# Patient Record
Sex: FEMALE | Race: BLACK OR AFRICAN AMERICAN | NOT HISPANIC OR LATINO | Employment: OTHER | URBAN - METROPOLITAN AREA
[De-identification: names, ages, dates, MRNs, and addresses within clinical notes are randomized per-mention and may not be internally consistent; named-entity substitution may affect disease eponyms.]

---

## 2017-01-11 DIAGNOSIS — M81.0 SENILE OSTEOPOROSIS: Primary | ICD-10-CM

## 2017-01-16 ENCOUNTER — INFUSION (OUTPATIENT)
Dept: INFECTIOUS DISEASES | Facility: HOSPITAL | Age: 77
End: 2017-01-16
Attending: INTERNAL MEDICINE
Payer: MEDICARE

## 2017-01-16 VITALS — WEIGHT: 127 LBS | BODY MASS INDEX: 24.94 KG/M2 | HEIGHT: 60 IN

## 2017-01-16 DIAGNOSIS — M81.0 SENILE OSTEOPOROSIS: Primary | ICD-10-CM

## 2017-01-16 PROCEDURE — 63600175 PHARM REV CODE 636 W HCPCS: Performed by: INTERNAL MEDICINE

## 2017-01-16 PROCEDURE — 96401 CHEMO ANTI-NEOPL SQ/IM: CPT

## 2017-01-16 RX ADMIN — DENOSUMAB 60 MG: 60 INJECTION SUBCUTANEOUS at 02:01

## 2017-01-20 RX ORDER — POTASSIUM CHLORIDE 750 MG/1
TABLET, EXTENDED RELEASE ORAL
Qty: 30 TABLET | Refills: 0 | Status: SHIPPED | OUTPATIENT
Start: 2017-01-20 | End: 2017-02-22 | Stop reason: SDUPTHER

## 2017-01-26 DIAGNOSIS — M05.79 RHEUMATOID ARTHRITIS INVOLVING MULTIPLE SITES WITH POSITIVE RHEUMATOID FACTOR: ICD-10-CM

## 2017-01-27 RX ORDER — METHOTREXATE 2.5 MG/1
TABLET ORAL
Qty: 20 TABLET | Refills: 0 | OUTPATIENT
Start: 2017-01-27

## 2017-02-03 ENCOUNTER — LAB VISIT (OUTPATIENT)
Dept: LAB | Facility: HOSPITAL | Age: 77
End: 2017-02-03
Attending: INTERNAL MEDICINE
Payer: MEDICARE

## 2017-02-03 DIAGNOSIS — M05.79 RHEUMATOID ARTHRITIS INVOLVING MULTIPLE SITES WITH POSITIVE RHEUMATOID FACTOR: ICD-10-CM

## 2017-02-03 DIAGNOSIS — Z79.899 HIGH RISK MEDICATION USE: Chronic | ICD-10-CM

## 2017-02-03 LAB
ALBUMIN SERPL BCP-MCNC: 3.5 G/DL
ALP SERPL-CCNC: 62 U/L
ALT SERPL W/O P-5'-P-CCNC: 20 U/L
ANION GAP SERPL CALC-SCNC: 9 MMOL/L
AST SERPL-CCNC: 19 U/L
BASOPHILS # BLD AUTO: 0.04 K/UL
BASOPHILS NFR BLD: 0.4 %
BILIRUB SERPL-MCNC: 0.7 MG/DL
BUN SERPL-MCNC: 14 MG/DL
CALCIUM SERPL-MCNC: 8.9 MG/DL
CHLORIDE SERPL-SCNC: 109 MMOL/L
CO2 SERPL-SCNC: 26 MMOL/L
CREAT SERPL-MCNC: 0.9 MG/DL
CRP SERPL-MCNC: 1 MG/L
DIFFERENTIAL METHOD: ABNORMAL
EOSINOPHIL # BLD AUTO: 0.4 K/UL
EOSINOPHIL NFR BLD: 3.9 %
ERYTHROCYTE [DISTWIDTH] IN BLOOD BY AUTOMATED COUNT: 18.2 %
ERYTHROCYTE [SEDIMENTATION RATE] IN BLOOD BY WESTERGREN METHOD: 24 MM/HR
EST. GFR  (AFRICAN AMERICAN): >60 ML/MIN/1.73 M^2
EST. GFR  (NON AFRICAN AMERICAN): >60 ML/MIN/1.73 M^2
GLUCOSE SERPL-MCNC: 91 MG/DL
HCT VFR BLD AUTO: 33.1 %
HGB BLD-MCNC: 9.9 G/DL
LYMPHOCYTES # BLD AUTO: 1.8 K/UL
LYMPHOCYTES NFR BLD: 20.7 %
MCH RBC QN AUTO: 25.3 PG
MCHC RBC AUTO-ENTMCNC: 29.9 %
MCV RBC AUTO: 85 FL
MONOCYTES # BLD AUTO: 0.6 K/UL
MONOCYTES NFR BLD: 7.1 %
NEUTROPHILS # BLD AUTO: 6 K/UL
NEUTROPHILS NFR BLD: 67.6 %
PLATELET # BLD AUTO: 366 K/UL
PMV BLD AUTO: 10.7 FL
POTASSIUM SERPL-SCNC: 3.7 MMOL/L
PROT SERPL-MCNC: 7.1 G/DL
RBC # BLD AUTO: 3.91 M/UL
SODIUM SERPL-SCNC: 144 MMOL/L
WBC # BLD AUTO: 8.9 K/UL

## 2017-02-03 PROCEDURE — 86140 C-REACTIVE PROTEIN: CPT

## 2017-02-03 PROCEDURE — 36415 COLL VENOUS BLD VENIPUNCTURE: CPT

## 2017-02-03 PROCEDURE — 80053 COMPREHEN METABOLIC PANEL: CPT

## 2017-02-03 PROCEDURE — 85651 RBC SED RATE NONAUTOMATED: CPT

## 2017-02-03 PROCEDURE — 85025 COMPLETE CBC W/AUTO DIFF WBC: CPT

## 2017-02-07 DIAGNOSIS — M05.9 SEROPOSITIVE RHEUMATOID ARTHRITIS: ICD-10-CM

## 2017-02-07 DIAGNOSIS — M05.79 RHEUMATOID ARTHRITIS INVOLVING MULTIPLE SITES WITH POSITIVE RHEUMATOID FACTOR: ICD-10-CM

## 2017-02-07 RX ORDER — METHOTREXATE 2.5 MG/1
TABLET ORAL
Qty: 20 TABLET | Refills: 0 | Status: SHIPPED | OUTPATIENT
Start: 2017-02-07 | End: 2017-02-08 | Stop reason: SDUPTHER

## 2017-02-07 RX ORDER — FOLIC ACID 1 MG/1
TABLET ORAL
Qty: 90 TABLET | Refills: 0 | Status: SHIPPED | OUTPATIENT
Start: 2017-02-07 | End: 2017-05-12 | Stop reason: SDUPTHER

## 2017-02-08 RX ORDER — METHOTREXATE 2.5 MG/1
TABLET ORAL
Qty: 20 TABLET | Refills: 2 | Status: SHIPPED | OUTPATIENT
Start: 2017-02-08 | End: 2017-06-30 | Stop reason: SDUPTHER

## 2017-02-23 RX ORDER — POTASSIUM CHLORIDE 750 MG/1
TABLET, EXTENDED RELEASE ORAL
Qty: 30 TABLET | Refills: 0 | Status: SHIPPED | OUTPATIENT
Start: 2017-02-23 | End: 2017-03-29 | Stop reason: SDUPTHER

## 2017-02-24 ENCOUNTER — TELEPHONE (OUTPATIENT)
Dept: INTERNAL MEDICINE | Facility: CLINIC | Age: 77
End: 2017-02-24

## 2017-02-24 ENCOUNTER — OFFICE VISIT (OUTPATIENT)
Dept: RHEUMATOLOGY | Facility: CLINIC | Age: 77
End: 2017-02-24
Payer: MEDICARE

## 2017-02-24 VITALS
HEART RATE: 79 BPM | WEIGHT: 126.81 LBS | DIASTOLIC BLOOD PRESSURE: 62 MMHG | SYSTOLIC BLOOD PRESSURE: 149 MMHG | HEIGHT: 57 IN | BODY MASS INDEX: 27.36 KG/M2

## 2017-02-24 DIAGNOSIS — M81.0 SENILE OSTEOPOROSIS: ICD-10-CM

## 2017-02-24 DIAGNOSIS — D63.8 ANEMIA OF CHRONIC ILLNESS: Chronic | ICD-10-CM

## 2017-02-24 DIAGNOSIS — Z79.899 HIGH RISK MEDICATION USE: Chronic | ICD-10-CM

## 2017-02-24 DIAGNOSIS — M05.79 RHEUMATOID ARTHRITIS INVOLVING MULTIPLE SITES WITH POSITIVE RHEUMATOID FACTOR: Primary | ICD-10-CM

## 2017-02-24 PROCEDURE — 99999 PR PBB SHADOW E&M-EST. PATIENT-LVL III: CPT | Mod: PBBFAC,,, | Performed by: INTERNAL MEDICINE

## 2017-02-24 PROCEDURE — 99214 OFFICE O/P EST MOD 30 MIN: CPT | Mod: S$PBB,,, | Performed by: INTERNAL MEDICINE

## 2017-02-24 PROCEDURE — 99213 OFFICE O/P EST LOW 20 MIN: CPT | Mod: PBBFAC | Performed by: INTERNAL MEDICINE

## 2017-02-24 ASSESSMENT — ROUTINE ASSESSMENT OF PATIENT INDEX DATA (RAPID3)
PAIN SCORE: 5
PATIENT GLOBAL ASSESSMENT SCORE: 6
TOTAL RAPID3 SCORE: 4.33
FATIGUE SCORE: 5.5
PSYCHOLOGICAL DISTRESS SCORE: 1.1
WHEN YOU AWAKENED IN THE MORNING OVER THE LAST WEEK, PLEASE INDICATE THE AMOUNT OF TIME IT TAKES UNTIL YOU ARE AS LIMBER AS YOU WILL BE FOR THE DAY: 2 MINUTES
MDHAQ FUNCTION SCORE: .6
AM STIFFNESS SCORE: 1, YES

## 2017-02-24 NOTE — MR AVS SNAPSHOT
[facility]  [address]  [facility] [address]  Phone: [phone]  Fax: [phone]                  Teresita Tolbert   2017 10:00 AM   Office Visit    Description:  Female : 1940   Provider:  Nico Squires MD   Department:  [facility]           Reason for Visit     Disease Management           Diagnoses this Visit        Comments    Rheumatoid arthritis involving multiple sites with positive rheumatoid factor    -  Primary     High risk medication use         Anemia of chronic illness         Senile osteoporosis                To Do List           Future Appointments        Provider Department Dept Phone    3/9/2017 1:00 PM Waqas Georges III, MD [facility] Neurology [phone]      Goals (5 Years of Data)     None      Follow-Up and Disposition     Return in about 3 months (around 2017).      [facility]     [facility] -  Assistance  Registered nurses in the [facility] provide clinical advisement, health education, appointment booking, and other advisory services.  Call for this free service at [phone].             Medications           Message regarding Medications     Verify the changes and/or additions to your medication regime listed below are the same as discussed with your clinician today.  If any of these changes or additions are incorrect, please notify your healthcare provider.             Verify that the below list of medications is an accurate representation of the medications you are currently taking.  If none reported, the list may be blank. If incorrect, please contact your healthcare provider. Carry this list with you in case of emergency.           Current Medications     amlodipine (NORVASC) 5 MG tablet Take 1 tablet (5 mg total) by mouth once daily.    aspirin (ECOTRIN) 81 MG EC tablet Take 81 mg by mouth once daily.      atenolol (TENORMIN) 50 MG tablet Take 2 tablets (100 mg  total) by mouth once daily.    bismuth subsalicylate (PEPTO BISMOL) 262 mg/15 mL suspension Take 15 mLs by mouth every 6 (six) hours as needed.    BLOOD SUGAR DIAGNOSTIC (ONE TOUCH ULTRA TEST [facility]) * * * As directed.  test sugars 2 times daily    blood sugar diagnostic (ONETOUCH ULTRA TEST) Strp USE TO TEST BLOOD SUGAR2 TIMES DAILY    calcium-vitamin D3 500 mg(1,250mg) -200 unit per tablet Take 1 tablet by mouth once daily.    folic acid (FOLVITE) 1 MG tablet TAKE 1 TABLET BY MOUTH EVERY DAY    methotrexate 2.5 MG Tab TAKE 4 TABLETS BY MOUTH EVERY SEVEN DAYS    multivitamin capsule Take 1 capsule by mouth once daily.      ONETOUCH ULTRA TEST Strp USE TO TEST THREE TIMES DAILY.    potassium chloride (KLOR-CON) 10 MEQ TbSR TAKE 1 TABLET BY MOUTH DAILY    predniSONE (DELTASONE) 5 MG tablet Take 2 tablets (10 mg total) by mouth every other day.    sitagliptan-metformin (JANUMET)  mg per tablet Take 1 tablet by mouth 2 (two) times daily.           Clinical Reference Information           Your Vitals Were     BP                   149/62           Blood Pressure          Most Recent Value    BP  (!)  149/62      Allergies as of 2/24/2017     Erythromycin    Sulfa (Sulfonamide Antibiotics)      Immunizations Administered on Date of Encounter - 2/24/2017     None      [facility] Sign-Up     Activating your [facility] account is as easy as 1-2-3!     1) Visit [email or web address], select Sign Up Now, enter this activation code and your date of birth, then select Next.  [email or web address]  Expires: 4/10/2017 10:49 AM      2) Create a username and password to use when you visit [facility] in the future and select a security question in case you lose your password and select Next.    3) Enter your e-mail address and click Sign Up!    Additional Information  If you have questions, please e-mail [email or web address] or call [phone] to talk to our [facility] staff. Remember, [facility] is NOT to be used for urgent needs. For  medical emergencies, dial 911.         Language Assistance Services     ATTENTION: Language assistance services are available, free of charge. Please call [phone].      ATENCIÓN: Si habla giovanna, tiene a yoo disposición servicios gratuitos de asistencia lingüística. Llame al [phone].     CHÚ Ý: N?u b?n nói Ti?ng Vi?t, có các d?ch v? h? tr? ngôn ng? mi?n phí dành cho b?n. G?i s? [phone].         [facility] complies with applicable Federal civil rights laws and does not discriminate on the basis of race, color, national origin, age, disability, or sex.

## 2017-02-24 NOTE — PROGRESS NOTES
"Subjective:       Patient ID: Teresita Tolbert is a 76 y.o. female.    Chief Complaint: Disease Management    HPI   F/u RA, seropos on MTX 10 mg/week since May 2015  F/u osteoporosis with lumbar compressions     Had prolia Jan 16  PMH: myasthenia gravis on prednisone and previously azathioprine until started MTX for RA     No hand pain   Managing to function with hand deformities    No interval infections    Review of Systems   Constitutional: Negative for fatigue, fever and unexpected weight change.   HENT: Positive for trouble swallowing. Negative for mouth sores.          Dry mouth   Eyes: Negative for redness.        No Dry eyes   Respiratory: Positive for shortness of breath. Negative for cough.    Cardiovascular: Positive for chest pain.   Gastrointestinal: Positive for constipation and diarrhea. Negative for abdominal pain.        Chews AR for heartburn; it relieves her sx   Skin: Negative for rash.   Neurological: Negative for headaches.   Hematological: Negative for adenopathy. Does not bruise/bleed easily.   Psychiatric/Behavioral: Negative for sleep disturbance.         Objective:   BP (!) 149/62  Pulse 79  Ht 4' 9" (1.448 m)  Wt 57.5 kg (126 lb 12.8 oz)  BMI 27.44 kg/m2     Physical Exam    hand deformities with ulnar deviation L 3-5 digits; no swelling or tenderness    Lab reviewed ; ok except chronic anemia sl worse  Assessment:       1. Rheumatoid arthritis involving multiple sites with positive rheumatoid factor    2. High risk medication use    3. Anemia of chronic illness    4. Senile osteoporosis        Rheumatoid  arthritis with low disease activity on methotrexate monotherapy  Tolerating methotrexate without adverse effects  3.  Anemia of chronic disease which has worsened somewhat, etiology of which is not clear  Plan:       1.  Continue current therapy  2. She will have lab in 3 mo for mtx monitoring  3. I will send note to Dr. Barrera for his f/u of anemia; her consider GI workup versus " empiric proton pump inhibitor therapy

## 2017-03-01 NOTE — TELEPHONE ENCOUNTER
Pt called back- phone staff sched 30 min EP visit on 3/24/17 11:15am. Pt confirmed appt date and time.

## 2017-03-09 ENCOUNTER — OFFICE VISIT (OUTPATIENT)
Dept: NEUROLOGY | Facility: CLINIC | Age: 77
End: 2017-03-09
Payer: MEDICARE

## 2017-03-09 VITALS
DIASTOLIC BLOOD PRESSURE: 78 MMHG | BODY MASS INDEX: 24.31 KG/M2 | HEART RATE: 77 BPM | SYSTOLIC BLOOD PRESSURE: 174 MMHG | WEIGHT: 128.75 LBS | HEIGHT: 61 IN

## 2017-03-09 DIAGNOSIS — M05.79 RHEUMATOID ARTHRITIS INVOLVING MULTIPLE SITES WITH POSITIVE RHEUMATOID FACTOR: ICD-10-CM

## 2017-03-09 DIAGNOSIS — E11.8 TYPE 2 DIABETES MELLITUS WITH COMPLICATION, WITHOUT LONG-TERM CURRENT USE OF INSULIN: ICD-10-CM

## 2017-03-09 DIAGNOSIS — Z90.89 MYASTHENIA GRAVIS STATUS POST THYMECTOMY: Primary | ICD-10-CM

## 2017-03-09 DIAGNOSIS — G62.9 PERIPHERAL POLYNEUROPATHY: ICD-10-CM

## 2017-03-09 DIAGNOSIS — I10 ESSENTIAL HYPERTENSION: ICD-10-CM

## 2017-03-09 DIAGNOSIS — G70.00 MYASTHENIA GRAVIS STATUS POST THYMECTOMY: Primary | ICD-10-CM

## 2017-03-09 PROCEDURE — 99999 PR PBB SHADOW E&M-EST. PATIENT-LVL III: CPT | Mod: PBBFAC,,,

## 2017-03-09 PROCEDURE — 99213 OFFICE O/P EST LOW 20 MIN: CPT | Mod: PBBFAC

## 2017-03-09 PROCEDURE — 99214 OFFICE O/P EST MOD 30 MIN: CPT | Mod: S$PBB,,,

## 2017-03-09 RX ORDER — PREDNISONE 5 MG/1
10 TABLET ORAL EVERY OTHER DAY
Qty: 90 TABLET | Refills: 1 | Status: SHIPPED | OUTPATIENT
Start: 2017-03-09 | End: 2017-04-12 | Stop reason: SDUPTHER

## 2017-03-09 NOTE — PROGRESS NOTES
This office note has been dictated.  Teresita Tolbert [facility] to [facility] for medical management of myasthenia   gravis.  When I saw her last, she was experiencing some swallowing difficulty   after cutting back her prednisone dose to 5 mg every other day.  I put her back   on the 10 mg every other day dosing and she feels like she has gotten back to   her baseline.  Her swallowing has improved.  She does not report any new   problems.  She does have a number of comorbidities including rheumatoid   arthritis for which she sees Dr. Squires, as well as hypertension and diabetes   among others.    NEUROLOGICAL REVIEW OF SYSTEMS:  She mentions feeling tired.  Her speech problem   is old.  She still experiences some occasional heartburn as noted previously.    Her diabetes medicine can sometimes cause her to have diarrhea.  There is also   some urinary frequency without incontinence.  She denies fever, chills, sweats,   dizziness, tinnitus, hearing loss, headaches, chest pain, cough, shortness of   breath, nausea and vomiting.    MEDICATIONS:  I reviewed the list of medicines and edited the electronic record.    SOCIAL HISTORY:  She lives with her son.    PHYSICAL EXAMINATION:  NEUROLOGIC:  This is an alert and attentive lady.  Her speech is again slightly   slurred with a tendency to protrude her tongue as she talks.  It is adequately   articulated and appropriate.  The vital signs are reviewed and included in this   note.  The blood pressure is a little elevated and I want her to follow this up   with her primary care.  She is neatly dressed and in no acute distress.  Cranial   nerve examination again reveals the postoperative pupils.  Her eyes move   conjugately and the fields are full to confrontation.  She has weakness,   particularly of the upper face as she is unable to completely close her eyes.    She snarls when she smiles.  There are no gross sensory deficits on the face and   she protrudes the tongue to  command.  Hearing is approximately equal in the two   ears.  Her neck is supple, the pulses are equal and no bruits are heard.  There   is no drift to the upper extremities with adequate  strength, but again she   is noted to have interosseous atrophy, weakness and arthritic changes in her   hands and fingers.  Muscle tone is within normal limits, however.  Fine motor   movements are decreased bilaterally.  Rapid alternating movements are adequately   performed as is finger-to-nose.  There are no gross sensory deficits.  She is   able to rise from the chair and walk with a shortened stride without balance   assistance, though she benefits from a cane for distance.  Her deep tendon   reflexes are symmetrical with the exception of the ankle jerks, which cannot be   elicited.    I reviewed her clinic record including her most recent laboratory and discussed   the situation with her in detail.    In conclusion, this lady is doing well at this time and her prednisone 10 mg   every other day will be continued.  The prescription has been forwarded to the   pharmacy electronically.  I have recommended she follow up with her primary for   the blood pressure and to monitor her chronic anemia.  Her next appointment with   me will be in six months, though she may call if there are any questions in the   interim.      EH/LINUS  dd: 03/09/2017 13:38:04 (CST)  td: 03/10/2017 04:16:35 (CST)  Doc ID   #[id number]  Job ID #[id number]    CC:

## 2017-03-09 NOTE — MR AVS SNAPSHOT
[facility]  [address]  [facility] [address]  Phone: [phone]  Fax: [phone]                  Teresita Tolbert   3/9/2017 1:00 PM   Office Visit    Description:  Female : 1940   Provider:  Waqas Georges III, MD   Department:  [facility]           Reason for Visit     Follow-up           Diagnoses this Visit        Comments    Myasthenia gravis status post thymectomy    -  Primary     Type 2 diabetes mellitus with complication, without long-term current use of insulin         Peripheral polyneuropathy         Essential hypertension         Rheumatoid arthritis involving multiple sites with positive rheumatoid factor                To Do List           Future Appointments        Provider Department Dept Phone    3/24/2017 11:15 AM Meet Barrera MD [facility] - Internal Medicine [phone]    2017 8:15 AM LAB, SAME DAY [facility] [phone]    2017 9:00 AM Nico Squires MD [facility] - Rheumatology [phone]      Goals (5 Years of Data)     None      Follow-Up and Disposition     Return in about 6 months (around 2017).       These Medications        Disp Refills Start End    predniSONE (DELTASONE) 5 MG tablet 90 tablet 1 3/9/2017     Take 2 tablets (10 mg total) by mouth every other day. - Oral    Pharmacy: [facility] [address] [place] [address] AT [facility] Ph #: [phone]         [facility]     [facility] Care [facility] -  Assistance  Registered nurses in the [facility] provide clinical advisement, health education, appointment booking, and other advisory services.  Call for this free service at [phone].             Medications           Message regarding Medications     Verify the changes and/or additions to your medication regime listed below are the same as discussed with your clinician today.  If any of these changes or  "additions are incorrect, please notify your healthcare provider.             Verify that the below list of medications is an accurate representation of the medications you are currently taking.  If none reported, the list may be blank. If incorrect, please contact your healthcare provider. Carry this list with you in case of emergency.           Current Medications     amlodipine (NORVASC) 5 MG tablet Take 1 tablet (5 mg total) by mouth once daily.    aspirin (ECOTRIN) 81 MG EC tablet Take 81 mg by mouth once daily.      atenolol (TENORMIN) 50 MG tablet Take 2 tablets (100 mg total) by mouth once daily.    bismuth subsalicylate (PEPTO BISMOL) 262 mg/15 mL suspension Take 15 mLs by mouth every 6 (six) hours as needed.    BLOOD SUGAR DIAGNOSTIC (ONE TOUCH ULTRA TEST [facility]) * * * As directed.  test sugars 2 times daily    blood sugar diagnostic (ONETOUCH ULTRA TEST) Strp USE TO TEST BLOOD SUGAR2 TIMES DAILY    calcium-vitamin D3 500 mg(1,250mg) -200 unit per tablet Take 1 tablet by mouth once daily.    folic acid (FOLVITE) 1 MG tablet TAKE 1 TABLET BY MOUTH EVERY DAY    methotrexate 2.5 MG Tab TAKE 4 TABLETS BY MOUTH EVERY SEVEN DAYS    multivitamin capsule Take 1 capsule by mouth once daily.      ONETOUCH ULTRA TEST Strp USE TO TEST THREE TIMES DAILY.    potassium chloride (KLOR-CON) 10 MEQ TbSR TAKE 1 TABLET BY MOUTH DAILY    predniSONE (DELTASONE) 5 MG tablet Take 2 tablets (10 mg total) by mouth every other day.    sitagliptan-metformin (JANUMET)  mg per tablet Take 1 tablet by mouth 2 (two) times daily.           Clinical Reference Information           Your Vitals Were     BP Pulse Height Weight BMI    174/78 77 5' 1" (1.549 m) 58.4 kg (128 lb 12 oz) 24.33 kg/m2      Blood Pressure          Most Recent Value    BP  (!)  174/78      Allergies as of 3/9/2017     Erythromycin    Sulfa (Sulfonamide Antibiotics)      Immunizations Administered on Date of Encounter - 3/9/2017     None      [facility] Sign-Up     " Activating your [facility] account is as easy as 1-2-3!     1) Visit [email or web address], select Sign Up Now, enter this activation code and your date of birth, then select Next.  [email or web address]  Expires: 4/10/2017 10:49 AM      2) Create a username and password to use when you visit [facility] in the future and select a security question in case you lose your password and select Next.    3) Enter your e-mail address and click Sign Up!    Additional Information  If you have questions, please e-mail [email or web address] or call [phone] to talk to our [facility] staff. Remember, [facility] is NOT to be used for urgent needs. For medical emergencies, dial 911.         Language Assistance Services     ATTENTION: Language assistance services are available, free of charge. Please call [phone].      ATENCIÓN: Si habla giovanna, tiene a yoo disposición servicios gratuitos de asistencia lingüística. Llame al [phone].     BETZAIDA Ý: N?u b?n nói Ti?ng Vi?t, có các d?ch v? h? tr? ngôn ng? mi?n phí dành cho b?n. G?i s? [phone].         [facility] complies with applicable Federal civil rights laws and does not discriminate on the basis of race, color, national origin, age, disability, or sex.

## 2017-03-24 ENCOUNTER — OFFICE VISIT (OUTPATIENT)
Dept: INTERNAL MEDICINE | Facility: CLINIC | Age: 77
End: 2017-03-24
Payer: MEDICARE

## 2017-03-24 ENCOUNTER — LAB VISIT (OUTPATIENT)
Dept: LAB | Facility: HOSPITAL | Age: 77
End: 2017-03-24
Attending: INTERNAL MEDICINE
Payer: MEDICARE

## 2017-03-24 VITALS
HEART RATE: 64 BPM | BODY MASS INDEX: 23.93 KG/M2 | SYSTOLIC BLOOD PRESSURE: 120 MMHG | WEIGHT: 130.06 LBS | HEIGHT: 62 IN | DIASTOLIC BLOOD PRESSURE: 82 MMHG

## 2017-03-24 DIAGNOSIS — G70.00 MYASTHENIA GRAVIS STATUS POST THYMECTOMY: ICD-10-CM

## 2017-03-24 DIAGNOSIS — E11.8 TYPE 2 DIABETES MELLITUS WITH COMPLICATION, WITHOUT LONG-TERM CURRENT USE OF INSULIN: ICD-10-CM

## 2017-03-24 DIAGNOSIS — I10 ESSENTIAL HYPERTENSION: ICD-10-CM

## 2017-03-24 DIAGNOSIS — G60.9 HEREDITARY AND IDIOPATHIC NEUROPATHY: ICD-10-CM

## 2017-03-24 DIAGNOSIS — G62.9 PERIPHERAL POLYNEUROPATHY: ICD-10-CM

## 2017-03-24 DIAGNOSIS — D64.9 ANEMIA, UNSPECIFIED TYPE: ICD-10-CM

## 2017-03-24 DIAGNOSIS — Z90.89 MYASTHENIA GRAVIS STATUS POST THYMECTOMY: ICD-10-CM

## 2017-03-24 DIAGNOSIS — M05.79 RHEUMATOID ARTHRITIS INVOLVING MULTIPLE SITES WITH POSITIVE RHEUMATOID FACTOR: ICD-10-CM

## 2017-03-24 DIAGNOSIS — D64.9 ANEMIA, UNSPECIFIED TYPE: Primary | ICD-10-CM

## 2017-03-24 LAB
BASOPHILS # BLD AUTO: 0.03 K/UL
BASOPHILS NFR BLD: 0.4 %
CHOLEST/HDLC SERPL: 3.4 {RATIO}
DIFFERENTIAL METHOD: ABNORMAL
EOSINOPHIL # BLD AUTO: 0.5 K/UL
EOSINOPHIL NFR BLD: 6 %
ERYTHROCYTE [DISTWIDTH] IN BLOOD BY AUTOMATED COUNT: 18.5 %
FERRITIN SERPL-MCNC: 12 NG/ML
FOLATE SERPL-MCNC: 16.4 NG/ML
HCT VFR BLD AUTO: 33.9 %
HDL/CHOLESTEROL RATIO: 29.4 %
HDLC SERPL-MCNC: 143 MG/DL
HDLC SERPL-MCNC: 42 MG/DL
HGB BLD-MCNC: 10.3 G/DL
IRON SERPL-MCNC: 30 UG/DL
LDLC SERPL CALC-MCNC: 73.6 MG/DL
LYMPHOCYTES # BLD AUTO: 1.3 K/UL
LYMPHOCYTES NFR BLD: 15.9 %
MCH RBC QN AUTO: 25.6 PG
MCHC RBC AUTO-ENTMCNC: 30.4 %
MCV RBC AUTO: 84 FL
MONOCYTES # BLD AUTO: 0.6 K/UL
MONOCYTES NFR BLD: 7.5 %
NEUTROPHILS # BLD AUTO: 5.8 K/UL
NEUTROPHILS NFR BLD: 69.5 %
NONHDLC SERPL-MCNC: 101 MG/DL
PLATELET # BLD AUTO: 293 K/UL
PMV BLD AUTO: 11.2 FL
RBC # BLD AUTO: 4.02 M/UL
SATURATED IRON: 6 %
TOTAL IRON BINDING CAPACITY: 502 UG/DL
TRANSFERRIN SERPL-MCNC: 339 MG/DL
TRIGL SERPL-MCNC: 137 MG/DL
TSH SERPL DL<=0.005 MIU/L-ACNC: 1.72 UIU/ML
VIT B12 SERPL-MCNC: 571 PG/ML
WBC # BLD AUTO: 8.3 K/UL

## 2017-03-24 PROCEDURE — 82746 ASSAY OF FOLIC ACID SERUM: CPT

## 2017-03-24 PROCEDURE — 99999 PR PBB SHADOW E&M-EST. PATIENT-LVL III: CPT | Mod: PBBFAC,,, | Performed by: INTERNAL MEDICINE

## 2017-03-24 PROCEDURE — 99214 OFFICE O/P EST MOD 30 MIN: CPT | Mod: S$PBB,,, | Performed by: INTERNAL MEDICINE

## 2017-03-24 PROCEDURE — 36415 COLL VENOUS BLD VENIPUNCTURE: CPT

## 2017-03-24 PROCEDURE — 84443 ASSAY THYROID STIM HORMONE: CPT

## 2017-03-24 PROCEDURE — 82607 VITAMIN B-12: CPT

## 2017-03-24 PROCEDURE — 83540 ASSAY OF IRON: CPT

## 2017-03-24 PROCEDURE — 85025 COMPLETE CBC W/AUTO DIFF WBC: CPT

## 2017-03-24 PROCEDURE — 80061 LIPID PANEL: CPT

## 2017-03-24 PROCEDURE — 83036 HEMOGLOBIN GLYCOSYLATED A1C: CPT

## 2017-03-24 PROCEDURE — 82728 ASSAY OF FERRITIN: CPT

## 2017-03-24 NOTE — PROGRESS NOTES
Subjective:       Patient ID: Teresita Tolbert is a 76 y.o. female.    Chief Complaint: Annual Exam    HPI Comments: History of present illness: Patient with history of V, hypertension, myasthenia gravis diabetes and rheumatoid arthritis comes in for follow-up of anemia.  Her blood count has dropped below 10 and although she's had a slow decline over the last year or 2 this is the lowest it is been in a while.  She and her son would prefer not a colonoscopy done and understands we need to look for source of bleeding.  We have elected to have the patient do stool studies and some labs.  Her MCV is not microcytic however so this may not be a pure blood loss or iron deficient anemia.  She is on methotrexate and other medicines which could be contributing.  Her weight is stabilized after some weight loss a year or 2 ago.  Pressure has been stable.  She  needs a new A1c    Review of Systems   Constitutional: Positive for fatigue (secondary to myasthenia). Negative for appetite change, chills and fever.   HENT: Negative for sore throat.         Chronic tongue hypertrophy   Respiratory: Negative for cough, shortness of breath and wheezing.    Cardiovascular: Negative for chest pain and leg swelling.   Gastrointestinal: Negative for abdominal pain, anal bleeding, blood in stool, constipation and diarrhea.   Genitourinary: Negative for difficulty urinating and dysuria.   Musculoskeletal: Negative for neck pain and neck stiffness.   Skin: Negative for rash.       Objective:      Physical Exam   Constitutional: She is oriented to person, place, and time. She appears well-developed and well-nourished. No distress.   HENT:   Head: Normocephalic and atraumatic.   Mouth/Throat: Oropharynx is clear and moist. No oropharyngeal exudate.   Chronic tongue hypertrophy   Eyes: Conjunctivae are normal. Pupils are equal, round, and reactive to light. No scleral icterus.   Neck: Normal range of motion. Neck supple. No thyromegaly present.    Cardiovascular: Normal rate and regular rhythm.    No murmur heard.  Pulmonary/Chest: Effort normal and breath sounds normal. She has no wheezes.   Abdominal: Soft. Bowel sounds are normal. She exhibits no distension. There is no tenderness.   Musculoskeletal: She exhibits no tenderness.   Lymphadenopathy:     She has no cervical adenopathy.   Neurological: She is alert and oriented to person, place, and time.   Skin: No rash noted.   Psychiatric: She has a normal mood and affect. Her behavior is normal.       Assessment:       1. Anemia, unspecified type    2. Essential hypertension    3. Type 2 diabetes mellitus with complication, without long-term current use of insulin    4. Myasthenia gravis status post thymectomy    5. Rheumatoid arthritis involving multiple sites with positive rheumatoid factor    6. Hereditary and idiopathic neuropathy     7. Peripheral polyneuropathy        Plan:       Teresita was seen today for annual exam.    Diagnoses and all orders for this visit:    Anemia, unspecified type  -     CBC auto differential; Future  -     Lipid panel; Future  -     TSH; Future  -     Hemoglobin A1c; Future  -     Iron and TIBC; Future  -     Ferritin; Future  -     Vitamin B12; Future  -     Folate; Future  -     Ambulatory referral to Optometry  -     Occult blood x 1, stool; Future  -     Occult blood x 1, stool; Future  -     Occult blood x 1, stool; Future    Essential hypertension  -     CBC auto differential; Future  -     Lipid panel; Future  -     TSH; Future  -     Hemoglobin A1c; Future  -     Iron and TIBC; Future  -     Ferritin; Future  -     Vitamin B12; Future  -     Folate; Future  -     Ambulatory referral to Optometry  -     Occult blood x 1, stool; Future  -     Occult blood x 1, stool; Future  -     Occult blood x 1, stool; Future    Type 2 diabetes mellitus with complication, without long-term current use of insulin  -     Hemoglobin A1c; Future  -     Ferritin; Future  -     Vitamin  B12; Future  -     Folate; Future  -     Ambulatory referral to Optometry    Myasthenia gravis status post thymectomy  -     Ferritin; Future  -     Vitamin B12; Future  -     Folate; Future  -     Ambulatory referral to Optometry    Rheumatoid arthritis involving multiple sites with positive rheumatoid factor  -     CBC auto differential; Future  -     Lipid panel; Future  -     TSH; Future  -     Hemoglobin A1c; Future  -     Iron and TIBC; Future  -     Ferritin; Future  -     Vitamin B12; Future  -     Folate; Future  -     Ambulatory referral to Optometry  -     Occult blood x 1, stool; Future  -     Occult blood x 1, stool; Future  -     Occult blood x 1, stool; Future    Hereditary and idiopathic neuropathy   -     Vitamin B12; Future  -     Folate; Future    Peripheral polyneuropathy

## 2017-03-24 NOTE — MR AVS SNAPSHOT
[facility] - Internal Medicine  [address]  [facility] [address]  Phone: [phone]  Fax: [phone]                  Teresita Tolbert   3/24/2017 11:15 AM   Office Visit    Description:  Female : 1940   Provider:  Meet Barrera MD   Department:  [facility] - Internal Medicine           Reason for Visit     Annual Exam           Diagnoses this Visit        Comments    Anemia, unspecified type    -  Primary     Essential hypertension         Type 2 diabetes mellitus with complication, without long-term current use of insulin         Myasthenia gravis status post thymectomy         Rheumatoid arthritis involving multiple sites with positive rheumatoid factor         Hereditary and idiopathic neuropathy         Peripheral polyneuropathy                To Do List           Future Appointments        Provider Department Dept Phone    2017 8:15 AM LAB, SAME DAY [facility] [phone]    2017 9:00 AM Nico Squires MD [facility] Rheumatology [phone]      Goals (5 Years of Data)     None      [facility] On Call     [facility] Nurse Care Line -  Assistance  Registered nurses in the [facility] provide clinical advisement, health education, appointment booking, and other advisory services.  Call for this free service at [phone].             Medications           Message regarding Medications     Verify the changes and/or additions to your medication regime listed below are the same as discussed with your clinician today.  If any of these changes or additions are incorrect, please notify your healthcare provider.             Verify that the below list of medications is an accurate representation of the medications you are currently taking.  If none reported, the list may be blank. If incorrect, please contact your healthcare provider. Carry this list with you in case of emergency.           Current Medications     amlodipine  "(NORVASC) 5 MG tablet Take 1 tablet (5 mg total) by mouth once daily.    aspirin (ECOTRIN) 81 MG EC tablet Take 81 mg by mouth once daily.      atenolol (TENORMIN) 50 MG tablet Take 2 tablets (100 mg total) by mouth once daily.    bismuth subsalicylate (PEPTO BISMOL) 262 mg/15 mL suspension Take 15 mLs by mouth every 6 (six) hours as needed.    BLOOD SUGAR DIAGNOSTIC (ONE TOUCH ULTRA TEST [facility]) * * * As directed.  test sugars 2 times daily    blood sugar diagnostic (ONETOUCH ULTRA TEST) Strp USE TO TEST BLOOD SUGAR2 TIMES DAILY    calcium-vitamin D3 500 mg(1,250mg) -200 unit per tablet Take 1 tablet by mouth once daily.    folic acid (FOLVITE) 1 MG tablet TAKE 1 TABLET BY MOUTH EVERY DAY    methotrexate 2.5 MG Tab TAKE 4 TABLETS BY MOUTH EVERY SEVEN DAYS    multivitamin capsule Take 1 capsule by mouth once daily.      ONETOUCH ULTRA TEST Strp USE TO TEST THREE TIMES DAILY.    potassium chloride (KLOR-CON) 10 MEQ TbSR TAKE 1 TABLET BY MOUTH DAILY    predniSONE (DELTASONE) 5 MG tablet Take 2 tablets (10 mg total) by mouth every other day.    sitagliptan-metformin (JANUMET)  mg per tablet Take 1 tablet by mouth 2 (two) times daily.           Clinical Reference Information           Your Vitals Were     BP Pulse Height Weight BMI    120/82 (BP Location: Left arm, Patient Position: Sitting, BP Method: Manual) 64 5' 2" (1.575 m) 59 kg (130 lb 1.1 oz) 23.79 kg/m2      Blood Pressure          Most Recent Value    BP  120/82      Allergies as of 3/24/2017     Erythromycin    Sulfa (Sulfonamide Antibiotics)      Immunizations Administered on Date of Encounter - 3/24/2017     None      Orders Placed During Today's Visit      Normal Orders This Visit    Ambulatory referral to Optometry     Future Labs/Procedures Expected by Expires    CBC auto differential  3/24/2017 3/24/2018    Ferritin  3/24/2017 6/22/2017    Folate  3/24/2017 5/23/2018    Hemoglobin A1c  3/24/2017 3/24/2018    Iron and TIBC  3/24/2017 6/22/2017    " Lipid panel  3/24/2017 3/24/2018    Occult blood x 1, stool  3/24/2017 3/24/2018    Occult blood x 1, stool  3/24/2017 3/24/2018    Occult blood x 1, stool  3/24/2017 3/24/2018    TSH  3/24/2017 3/24/2018    Vitamin B12  3/24/2017 3/24/2018      [facility] Sign-Up     Activating your [facility] account is as easy as 1-2-3!     1) Visit [email or web address], select Sign Up Now, enter this activation code and your date of birth, then select Next.  [email or web address]  Expires: 4/10/2017 11:49 AM      2) Create a username and password to use when you visit [facility] in the future and select a security question in case you lose your password and select Next.    3) Enter your e-mail address and click Sign Up!    Additional Information  If you have questions, please e-mail [email or web address] or call [phone] to talk to our [facility] staff. Remember, [facility] is NOT to be used for urgent needs. For medical emergencies, dial 911.         Language Assistance Services     ATTENTION: Language assistance services are available, free of charge. Please call [phone].      ATENCIÓN: Si habla español, tiene a yoo disposición servicios gratuitos de asistencia lingüística. Llame al [phone].     CHÚ Ý: N?u b?n nói Ti?ng Vi?t, có các d?ch v? h? tr? ngôn ng? mi?n phí dành cho b?n. G?i s? [phone].         [facility] complies with applicable Federal civil rights laws and does not discriminate on the basis of race, color, national origin, age, disability, or sex.

## 2017-03-25 LAB
ESTIMATED AVG GLUCOSE: 157 MG/DL
HBA1C MFR BLD HPLC: 7.1 %

## 2017-03-28 ENCOUNTER — LAB VISIT (OUTPATIENT)
Dept: LAB | Facility: HOSPITAL | Age: 77
End: 2017-03-28
Attending: INTERNAL MEDICINE
Payer: MEDICARE

## 2017-03-28 ENCOUNTER — TELEPHONE (OUTPATIENT)
Dept: INTERNAL MEDICINE | Facility: CLINIC | Age: 77
End: 2017-03-28

## 2017-03-28 DIAGNOSIS — D64.9 ANEMIA, UNSPECIFIED TYPE: ICD-10-CM

## 2017-03-28 DIAGNOSIS — I10 ESSENTIAL HYPERTENSION: ICD-10-CM

## 2017-03-28 DIAGNOSIS — D50.9 IRON DEFICIENCY ANEMIA, UNSPECIFIED IRON DEFICIENCY ANEMIA TYPE: Primary | ICD-10-CM

## 2017-03-28 DIAGNOSIS — M05.79 RHEUMATOID ARTHRITIS INVOLVING MULTIPLE SITES WITH POSITIVE RHEUMATOID FACTOR: ICD-10-CM

## 2017-03-28 PROCEDURE — 82272 OCCULT BLD FECES 1-3 TESTS: CPT | Mod: 91

## 2017-03-28 RX ORDER — FERROUS SULFATE 325(65) MG
325 TABLET ORAL
Qty: 30 TABLET | Refills: 3 | Status: SHIPPED | OUTPATIENT
Start: 2017-03-28 | End: 2017-08-04 | Stop reason: SDUPTHER

## 2017-03-29 DIAGNOSIS — D64.9 ANEMIA, UNSPECIFIED TYPE: Primary | ICD-10-CM

## 2017-03-29 DIAGNOSIS — R19.5 HEME POSITIVE STOOL: ICD-10-CM

## 2017-03-29 DIAGNOSIS — G70.00 MYASTHENIA: ICD-10-CM

## 2017-03-29 LAB
OB PNL STL: NEGATIVE
OB PNL STL: NEGATIVE
OB PNL STL: POSITIVE

## 2017-03-29 RX ORDER — POTASSIUM CHLORIDE 750 MG/1
TABLET, EXTENDED RELEASE ORAL
Qty: 30 TABLET | Refills: 0 | Status: SHIPPED | OUTPATIENT
Start: 2017-03-29 | End: 2017-04-26 | Stop reason: SDUPTHER

## 2017-03-29 NOTE — TELEPHONE ENCOUNTER
Please let pt's son know that the iron is low and likely the cause of the low blood count. I still want to see what the stool test for blood will show. Encourage them to collect and return this. Depending on that result we can decide whether to proceed with any scopes to look for bleeding. In the short term I want to start an iron tab once daily. She should take fiber with this to prevent constipation. I will send the iron to the pharmacy. Let me know of any questions. Also will need labs and follow up visit in 2 months.

## 2017-03-29 NOTE — TELEPHONE ENCOUNTER
Can we let the son know that the stool did show blood. I would like the pt to see Gastro to eval for futher workup for this and the low blood count

## 2017-03-29 NOTE — TELEPHONE ENCOUNTER
Can we let the son know that the stool did show blood. I would like the pt to see Gastro to eval for futher workup for this and the low blood count.

## 2017-03-30 NOTE — TELEPHONE ENCOUNTER
Spoke to pt son Pablo- dany below mess- pt voiced understanding. He will call Gastro and sched appt soon. Sched 2 month labs and f/u. Sent letter

## 2017-03-31 ENCOUNTER — TELEPHONE (OUTPATIENT)
Dept: HEMATOLOGY/ONCOLOGY | Facility: CLINIC | Age: 77
End: 2017-03-31

## 2017-03-31 ENCOUNTER — TELEPHONE (OUTPATIENT)
Dept: TRANSPLANT | Facility: CLINIC | Age: 77
End: 2017-03-31

## 2017-03-31 NOTE — TELEPHONE ENCOUNTER
Patient called and message left informing patient that the wrong deptment was contacted , patient given correction information and the phone number to call for an appointment in the GI department.

## 2017-03-31 NOTE — TELEPHONE ENCOUNTER
----- Message from Sarah Mott sent at 3/31/2017  2:52 PM CDT -----  Contact: pt son  ..Physician's office calling with new patient referral.    MD Office Name: Dr. Meet Barrera MD MD Office Phone Number: [phone]    Patient's Name: Teresita Tolbert  [phone]

## 2017-04-06 ENCOUNTER — OFFICE VISIT (OUTPATIENT)
Dept: OPTOMETRY | Facility: CLINIC | Age: 77
End: 2017-04-06
Payer: MEDICARE

## 2017-04-06 DIAGNOSIS — H16.8 EXPOSURE KERATITIS: ICD-10-CM

## 2017-04-06 DIAGNOSIS — H52.203 MYOPIA WITH ASTIGMATISM, BILATERAL: ICD-10-CM

## 2017-04-06 DIAGNOSIS — H52.13 MYOPIA WITH ASTIGMATISM, BILATERAL: ICD-10-CM

## 2017-04-06 DIAGNOSIS — Z13.5 GLAUCOMA SCREENING: ICD-10-CM

## 2017-04-06 DIAGNOSIS — E11.3553 STABLE PROLIFERATIVE DIABETIC RETINOPATHY OF BOTH EYES ASSOCIATED WITH TYPE 2 DIABETES MELLITUS: Primary | ICD-10-CM

## 2017-04-06 PROCEDURE — 92015 DETERMINE REFRACTIVE STATE: CPT | Mod: ,,, | Performed by: OPTOMETRIST

## 2017-04-06 PROCEDURE — 99212 OFFICE O/P EST SF 10 MIN: CPT | Mod: PBBFAC | Performed by: OPTOMETRIST

## 2017-04-06 PROCEDURE — 99999 PR PBB SHADOW E&M-EST. PATIENT-LVL II: CPT | Mod: PBBFAC,,, | Performed by: OPTOMETRIST

## 2017-04-06 PROCEDURE — 92014 COMPRE OPH EXAM EST PT 1/>: CPT | Mod: S$PBB,,, | Performed by: OPTOMETRIST

## 2017-04-06 RX ORDER — GENTAMICIN SULFATE 0.3 %
0.5 OINTMENT (GRAM) OPHTHALMIC (EYE) NIGHTLY
Qty: 1 TUBE | Refills: 1 | Status: SHIPPED | OUTPATIENT
Start: 2017-04-06 | End: 2017-06-27

## 2017-04-06 NOTE — PROGRESS NOTES
HPI     DLS:10/13/2015 Raúl           04/15/2015 Veronika  Patient here for Diabetic Eye Exam  Pt here w/son Pablo.   Pt states OU watery and vision   Blepharaplasty Dr. Silva   No pain.  LBS:121 this morning.    Hemoglobin A1C       Date                     Value               Ref Range             Status                03/24/2017               7.1 (H)             4.5 - 6.2 %           Final                08/25/2016               7.0 (H)             4.5 - 6.2 %           Final                  12/30/2015               6.7 (H)             4.5 - 6.2 %           Final                Systane BID       Last edited by Denzel Banda, OD on 4/6/2017 11:35 AM.         Assessment /Plan     For exam results, see Encounter Report.    Stable proliferative diabetic retinopathy of both eyes associated with type 2 diabetes mellitus  -     Ambulatory Referral to Ophthalmology  -referral Dr. 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  Dispense: 1 Tube; Refill: 1  -Gentamycin Harrison QHS  -Systane QID    Glaucoma screening  -Monitor with annual eye exam and IOP check    Myopia with astigmatism, bilateral  Eyeglass Final Rx     Eyeglass Final Rx      Sphere Cylinder Axis Add   Right -2.00 +0.50 124 +2.75   Left -0.75 +1.00 060 +2.75       Type:  PAL    Expiration Date:  4/7/2018              Hold sRx till after retina consultation    RTC ass directed Retina

## 2017-04-06 NOTE — LETTER
April 6, 2017      Meet Barrera MD  [address]  [facility] [address]           [facility]  [address]  [facility] [address]  Phone: [phone]  Fax: [phone]          Patient: Teresita Tolbert   MR Number: [id number]   YOB: 1940   Date of Visit: 4/6/2017       Dear Dr. Meet Barrera:    Thank you for referring Teresita Tolbert to me for evaluation. Attached you will find relevant portions of my assessment and plan of care.    If you have questions, please do not hesitate to call me. I look forward to following Teresita Tolbert along with you.    Sincerely,    Denzel Banda, OD    Enclosure  CC:  No Recipients    If you would like to receive this communication electronically, please contact [email or web address] or [phone] to request more information on [employer] Link access.    For providers and/or their staff who would like to refer a patient to [facility], please contact us through our one-stop-shop provider referral line, [facility] , at [phone].    If you feel you have received this communication in error or would no longer like to receive these types of communications, please e-mail [email or web address]

## 2017-04-06 NOTE — MR AVS SNAPSHOT
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         Glaucoma screening         Myopia with astigmatism, bilateral                To Do List           Future Appointments        Provider Department Dept Phone    2017 10:00 AM Shaunna Reyes NP [facility] - Gastroenterology [phone]    2017 8:15 AM LAB, SAME DAY [facility] [phone]    2017 9:00 AM Nico Squires MD [facility] - Rheumatology [phone]    2017 11:15 AM Meet Barrera MD [facility] - Internal Medicine [phone]      Goals (5 Years of Data)     None       These Medications        Disp Refills Start End    gentamicin (GARAMYCIN) 0.3 % (3 mg/gram) ophthalmic ointment 1 Tube 1 2017     Place 0.5 inches into both eyes every evening. - Both Eyes    Pharmacy: [facility] [address] [place] [address] AT [facility] Ph #: [phone]         [facility]     [facility] Care [facility] -  Assistance  Unless otherwise directed by your provider, please contact [facility], our nurse care line that is available for  assistance.     Registered nurses in the [facility] provide: appointment scheduling, clinical advisement, health education, and other advisory services.  Call: [phone] (toll free)               Medications           Message regarding Medications     Verify the changes and/or additions to your medication regime listed below are the  same as discussed with your clinician today.  If any of these changes or additions are incorrect, please notify your healthcare provider.        START taking these NEW medications        Refills    gentamicin (GARAMYCIN) 0.3 % (3 mg/gram) ophthalmic ointment 1    Sig: Place 0.5 inches into both eyes every evening.    Class: Normal    Route: Both Eyes           Verify that the below list of medications is an accurate representation of the medications you are currently taking.  If none reported, the list may be blank. If incorrect, please contact your healthcare provider. Carry this list with you in case of emergency.           Current Medications     amlodipine (NORVASC) 5 MG tablet Take 1 tablet (5 mg total) by mouth once daily.    aspirin (ECOTRIN) 81 MG EC tablet Take 81 mg by mouth once daily.      atenolol (TENORMIN) 50 MG tablet Take 2 tablets (100 mg total) by mouth once daily.    bismuth subsalicylate (PEPTO BISMOL) 262 mg/15 mL suspension Take 15 mLs by mouth every 6 (six) hours as needed.    BLOOD SUGAR DIAGNOSTIC (ONE TOUCH ULTRA TEST [facility]) * * * As directed.  test sugars 2 times daily    blood sugar diagnostic (ONETOUCH ULTRA TEST) Strp USE TO TEST BLOOD SUGAR2 TIMES DAILY    calcium-vitamin D3 500 mg(1,250mg) -200 unit per tablet Take 1 tablet by mouth once daily.    ferrous sulfate 325 mg (65 mg iron) Tab tablet Take 1 tablet (325 mg total) by mouth daily with breakfast.    folic acid (FOLVITE) 1 MG tablet TAKE 1 TABLET BY MOUTH EVERY DAY    methotrexate 2.5 MG Tab TAKE 4 TABLETS BY MOUTH EVERY SEVEN DAYS    multivitamin capsule Take 1 capsule by mouth once daily.      ONETOUCH ULTRA TEST Strp USE TO TEST THREE TIMES DAILY.    potassium chloride (KLOR-CON) 10 MEQ TbSR TAKE 1 TABLET BY MOUTH DAILY    predniSONE (DELTASONE) 5 MG tablet Take 2 tablets (10 mg total) by mouth every other day.    sitagliptan-metformin (JANUMET)  mg per tablet Take 1 tablet by mouth 2 (two) times daily.    gentamicin  (GARAMYCIN) 0.3 % (3 mg/gram) ophthalmic ointment Place 0.5 inches into both eyes every evening.           Clinical Reference Information           Allergies as of 4/6/2017     Erythromycin    Sulfa (Sulfonamide Antibiotics)      Immunizations Administered on Date of Encounter - 4/6/2017     None      Orders Placed During Today's Visit      Normal Orders This Visit    Ambulatory Referral to Ophthalmology       [facility] Sign-Up     Activating your [facility] account is as easy as 1-2-3!     1) Visit [email or web address], select Sign Up Now, enter this activation code and your date of birth, then select Next.  [email or web address]  Expires: 4/10/2017 11:49 AM      2) Create a username and password to use when you visit [facility] in the future and select a security question in case you lose your password and select Next.    3) Enter your e-mail address and click Sign Up!    Additional Information  If you have questions, please e-mail [email or web address] or call [phone] to talk to our [facility] staff. Remember, [facility] is NOT to be used for urgent needs. For medical emergencies, dial 911.         Language Assistance Services     ATTENTION: Language assistance services are available, free of charge. Please call [phone].      ATENCIÓN: Si habla español, tiene a yoo disposición servicios gratuitos de asistencia lingüística. Llame al [phone].     CHÚ Ý: N?u b?n nói Ti?ng Vi?t, có các d?ch v? h? tr? ngôn ng? mi?n phí dành cho b?n. G?i s? [phone].         [facility] complies with applicable Federal civil rights laws and does not discriminate on the basis of race, color, national origin, age, disability, or sex.

## 2017-04-07 ENCOUNTER — OFFICE VISIT (OUTPATIENT)
Dept: GASTROENTEROLOGY | Facility: CLINIC | Age: 77
End: 2017-04-07
Payer: MEDICARE

## 2017-04-07 ENCOUNTER — TELEPHONE (OUTPATIENT)
Dept: ENDOSCOPY | Facility: HOSPITAL | Age: 77
End: 2017-04-07

## 2017-04-07 VITALS
SYSTOLIC BLOOD PRESSURE: 174 MMHG | HEART RATE: 77 BPM | HEIGHT: 62 IN | BODY MASS INDEX: 23.93 KG/M2 | DIASTOLIC BLOOD PRESSURE: 78 MMHG | WEIGHT: 130.06 LBS

## 2017-04-07 DIAGNOSIS — D64.9 ANEMIA, UNSPECIFIED TYPE: Primary | ICD-10-CM

## 2017-04-07 DIAGNOSIS — K21.9 GASTROESOPHAGEAL REFLUX DISEASE, ESOPHAGITIS PRESENCE NOT SPECIFIED: ICD-10-CM

## 2017-04-07 DIAGNOSIS — G70.00 MYASTHENIA GRAVIS STATUS POST THYMECTOMY: ICD-10-CM

## 2017-04-07 DIAGNOSIS — Z90.89 MYASTHENIA GRAVIS STATUS POST THYMECTOMY: ICD-10-CM

## 2017-04-07 PROCEDURE — 99213 OFFICE O/P EST LOW 20 MIN: CPT | Mod: PBBFAC | Performed by: NURSE PRACTITIONER

## 2017-04-07 PROCEDURE — 99999 PR PBB SHADOW E&M-EST. PATIENT-LVL III: CPT | Mod: PBBFAC,,, | Performed by: NURSE PRACTITIONER

## 2017-04-07 PROCEDURE — 99204 OFFICE O/P NEW MOD 45 MIN: CPT | Mod: S$PBB,,, | Performed by: NURSE PRACTITIONER

## 2017-04-07 RX ORDER — PREDNISONE 5 MG/1
TABLET ORAL
Qty: 90 TABLET | Refills: 0 | OUTPATIENT
Start: 2017-04-07

## 2017-04-07 NOTE — PROGRESS NOTES
[facility] Note    Reason for Visit:  The primary encounter diagnosis was Anemia, unspecified type. A diagnosis of Gastroesophageal reflux disease, esophagitis presence not specified was also pertinent to this visit.    PCP:   Meet Barrera   [address] / [place] [address]    Referring MD:  Meet Barrera Md  [address] [place] [address]    HPI:  This is a 76 y.o. female here for evaluation of anemia. Ms. Tolbert is new to the clinic and here with her son. Pmhx of htn, myasthenia gravis, and diabetes.    She was worked up by PCP for anemia. Occult stool study positive 3/28/17. Last hgb 10.3 on 3/24/17. Has a normal formed stool daily. Denies hematochezia, hematemesis, melena, BRBPR, black/tarry stools, and coffee ground emesis. Denies CP, SOB, and Fatigue. No prior hx of and EGD and colonoscopy.     Pt reports 2x/month indigestion and substernal pain and will take Pepto Bismol and relief. Occurs with specific food triggers- spicy food or red sauces. Denies dysphagia, odynophagia, and abdominal pain. NSAID usage- Aspirin 81 mg daily and takes with food.     ROS:  Constitutional: No fevers, no chills, No unintentional weight loss, + fatigue   ENT: No allergies  CV: No chest pain, no palpitations, no perif. 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    Surgical History:  has a past surgical history that includes Cataract extraction; s/p focal laser (6-26-12); s/p avastin ou (5-22-12); and s/p avastin  (6-26-12).    Family History: family history includes Diabetes in her mother. There is no history of Celiac disease, Colon polyps, Colon cancer, Esophageal cancer, Inflammatory bowel disease, Irritable bowel syndrome, or Stomach cancer..     Social History:  reports that she quit smoking about 41 years ago. She has never used smokeless tobacco. She reports that she does not drink alcohol or use illicit drugs.    Review of patient's allergies indicates:   Allergen Reactions    Erythromycin Shortness Of Breath     All mycins per patient  -affects myasthenia gravis    Sulfa (sulfonamide antibiotics)        Current Outpatient Prescriptions   Medication Sig    amlodipine (NORVASC) 5 MG tablet Take 1 tablet (5 mg total) by mouth once daily.    aspirin (ECOTRIN) 81 MG EC tablet Take 81 mg by mouth once daily.      atenolol (TENORMIN) 50 MG tablet Take 2 tablets (100 mg total) by mouth once daily.    bismuth subsalicylate (PEPTO BISMOL) 262 mg/15 mL suspension Take 15 mLs by mouth every 6 (six) hours as needed.    BLOOD SUGAR DIAGNOSTIC (ONE TOUCH ULTRA TEST [facility]) * * * As directed.  test sugars 2 times daily    blood sugar diagnostic (ONETOUCH ULTRA TEST) Strp USE TO TEST BLOOD SUGAR2 TIMES DAILY    calcium-vitamin D3 500 mg(1,250mg) -200 unit per tablet Take 1 tablet by mouth once daily.    ferrous sulfate 325 mg (65 mg iron) Tab tablet Take 1 tablet (325 mg total) by mouth daily with breakfast.    folic acid (FOLVITE) 1 MG tablet TAKE 1 TABLET BY MOUTH EVERY DAY    gentamicin (GARAMYCIN) 0.3 % (3 mg/gram) ophthalmic ointment Place 0.5 inches into both eyes every evening.    methotrexate 2.5 MG Tab TAKE 4 TABLETS BY MOUTH EVERY SEVEN DAYS    multivitamin capsule Take 1 capsule by mouth once daily.       "ONETOUCH ULTRA TEST Strp USE TO TEST THREE TIMES DAILY.    potassium chloride (KLOR-CON) 10 MEQ TbSR TAKE 1 TABLET BY MOUTH DAILY    predniSONE (DELTASONE) 5 MG tablet Take 2 tablets (10 mg total) by mouth every other day.    sitagliptan-metformin (JANUMET)  mg per tablet Take 1 tablet by mouth 2 (two) times daily.     Current Facility-Administered Medications   Medication    denosumab (PROLIA) injection 60 mg     Objective Findings:    Vital Signs:  BP (!) 174/78  Pulse 77  Ht 5' 2" (1.575 m)  Wt 59 kg (130 lb 1.1 oz)  BMI 23.79 kg/m2  Body mass index is 23.79 kg/(m^2).    Did not take Blood pressure medication this morning.     Physical Exam:  General Appearance: Well appearing in no acute distress and using cane for walking long distance  Head: Normocephalic, without obvious abnormality  Eyes: No scleral icterus, EOMI  ENT: Neck supple, Lips, mucosa, and tongue normal; teeth and gums normal  Lungs: CTA bilaterally in anterior and posterior fields, no wheezes, no crackles.  Heart: Regular rate and rhythm, no murmurs heard  Abdomen: Soft, non tender, non distended with positive bowel sounds in all four quadrants.  Extremities: No clubbing, cyanosis or edema  Skin: No rash to exposed areas  Neurologic: AAOx4    Labs:  Lab Results   Component Value Date    WBC 8.30 03/24/2017    HGB 10.3 (L) 03/24/2017    HCT 33.9 (L) 03/24/2017     03/24/2017    CHOL 143 03/24/2017    TRIG 137 03/24/2017    HDL 42 03/24/2017    ALT 20 02/03/2017    AST 19 02/03/2017     02/03/2017    K 3.7 02/03/2017     02/03/2017    CREATININE 0.9 02/03/2017    BUN 14 02/03/2017    CO2 26 02/03/2017    TSH 1.717 03/24/2017    GLUF 125 (H) 03/28/2006    HGBA1C 7.1 (H) 03/24/2017     Endoscopy:    EGD- none    Colonoscopy- none    Assessment:  1. Anemia, unspecified type    2. Gastroesophageal reflux disease, esophagitis presence not specified      Recommendations:  1. Schedule EGD and colonoscopy to rule out GI " bleed. CBC, Iron studies, and occult stools studies collected 3/28/17.  2. Continue to take Pepto Bismol as needed. If Gerd symptoms increase to greater than twice per week will discuss a daily PPI and pt understood.     Follow up after EGD and Colonoscopy.     Order summary:  Orders Placed This Encounter    Case request GI: COLONOSCOPY, ESOPHAGOGASTRODUODENOSCOPY (EGD)     Thank you so much for allowing me to participate in the care of BERNARDINO Alvarez, FNP-C

## 2017-04-07 NOTE — LETTER
April 7, 2017      Meet Barrera MD  [address]  [facility] [address]           [facility] - Gastroenterology  [address]  [place] [address]  Phone: [phone]  Fax: [phone]          Patient: Teresita Tolbert   MR Number: [id number]   YOB: 1940   Date of Visit: 4/7/2017       Dear Dr. Meet Barrera:    Thank you for referring Teresita Tolbert to me for evaluation. Attached you will find relevant portions of my assessment and plan of care.    If you have questions, please do not hesitate to call me. I look forward to following Teresita Tolbert along with you.    Sincerely,    Shaunna Reyes, JOY    Enclosure  CC:  No Recipients    If you would like to receive this communication electronically, please contact [email or web address] or [phone] to request more information on [employer] Link access.    For providers and/or their staff who would like to refer a patient to [facility], please contact us through our one-stop-shop provider referral line, [facility] , at [phone].    If you feel you have received this communication in error or would no longer like to receive these types of communications, please e-mail [email or web address]

## 2017-04-07 NOTE — MR AVS SNAPSHOT
[facility]  [address] [address]  Phone: [phone]  Fax: [phone]                  Teresita Tolbert   2017 10:00 AM   Office Visit    Description:  Female : 1940   Provider:  Shaunna Reyes NP   Department:  [facility] - Gastroenterology           Reason for Visit     GI Problem           Diagnoses this Visit        Comments    Anemia, unspecified type    -  Primary            To Do List           Future Appointments        Provider Department Dept Phone    4/10/2017 9:20 AM JAMES Olsen MD [facility] - Ophthalmology [phone]    2017 8:15 AM LAB, SAME DAY [facility] [phone]    2017 9:00 AM Nico Squires MD [facility] - Rheumatology [phone]    2017 11:15 AM Meet Barrera MD [facility] - Internal Medicine [phone]      Goals (5 Years of Data)     None      [facility]     [facility] Nurse Care Line -  Assistance  Unless otherwise directed by your provider, please contact [facility], our nurse care line that is available for  assistance.     Registered nurses in the [facility] provide: appointment scheduling, clinical advisement, health education, and other advisory services.  Call: [phone] (toll free)               Medications           Message regarding Medications     Verify the changes and/or additions to your medication regime listed below are the same as discussed with your clinician today.  If any of these changes or additions are incorrect, please notify your healthcare provider.             Verify that the below list of medications is an accurate representation of the medications you are currently taking.  If none reported, the list may be blank. If incorrect, please contact your healthcare provider. Carry this list with you in case of emergency.           Current Medications     amlodipine (NORVASC) 5 MG tablet Take 1 tablet (5 mg  "total) by mouth once daily.    aspirin (ECOTRIN) 81 MG EC tablet Take 81 mg by mouth once daily.      atenolol (TENORMIN) 50 MG tablet Take 2 tablets (100 mg total) by mouth once daily.    bismuth subsalicylate (PEPTO BISMOL) 262 mg/15 mL suspension Take 15 mLs by mouth every 6 (six) hours as needed.    BLOOD SUGAR DIAGNOSTIC (ONE TOUCH ULTRA TEST MISC) * * * As directed.  test sugars 2 times daily    blood sugar diagnostic (ONETOUCH ULTRA TEST) Strp USE TO TEST BLOOD SUGAR2 TIMES DAILY    calcium-vitamin D3 500 mg(1,250mg) -200 unit per tablet Take 1 tablet by mouth once daily.    ferrous sulfate 325 mg (65 mg iron) Tab tablet Take 1 tablet (325 mg total) by mouth daily with breakfast.    folic acid (FOLVITE) 1 MG tablet TAKE 1 TABLET BY MOUTH EVERY DAY    gentamicin (GARAMYCIN) 0.3 % (3 mg/gram) ophthalmic ointment Place 0.5 inches into both eyes every evening.    methotrexate 2.5 MG Tab TAKE 4 TABLETS BY MOUTH EVERY SEVEN DAYS    multivitamin capsule Take 1 capsule by mouth once daily.      ONETOUCH ULTRA TEST Strp USE TO TEST THREE TIMES DAILY.    potassium chloride (KLOR-CON) 10 MEQ TbSR TAKE 1 TABLET BY MOUTH DAILY    predniSONE (DELTASONE) 5 MG tablet Take 2 tablets (10 mg total) by mouth every other day.    sitagliptan-metformin (JANUMET)  mg per tablet Take 1 tablet by mouth 2 (two) times daily.           Clinical Reference Information           Your Vitals Were     BP Pulse Height Weight BMI    174/78 77 5' 2" (1.575 m) 59 kg (130 lb 1.1 oz) 23.79 kg/m2      Blood Pressure          Most Recent Value    BP  (!)  174/78      Allergies as of 4/7/2017     Erythromycin    Sulfa (Sulfonamide Antibiotics)      Immunizations Administered on Date of Encounter - 4/7/2017     None      Orders Placed During Today's Visit      Normal Orders This Visit    Case request GI: COLONOSCOPY, ESOPHAGOGASTRODUODENOSCOPY (EGD)       [facility] Sign-Up     Activating your [facility] account is as easy as 1-2-3!     1) Visit " [email or web address], select Sign Up Now, enter this activation code and your date of birth, then select Next.  [email or web address]  Expires: 4/10/2017 11:49 AM      2) Create a username and password to use when you visit [facility] in the future and select a security question in case you lose your password and select Next.    3) Enter your e-mail address and click Sign Up!    Additional Information  If you have questions, please e-mail [email or web address] or call [phone] to talk to our [facility] staff. Remember, [facility] is NOT to be used for urgent needs. For medical emergencies, dial 911.         Language Assistance Services     ATTENTION: Language assistance services are available, free of charge. Please call [phone].      ATENCIÓN: Si habla español, tiene a yoo disposición servicios gratuitos de asistencia lingüística. Llame al [phone].     CHÚ Ý: N?u b?n nói Ti?ng Vi?t, có các d?ch v? h? tr? ngôn ng? mi?n phí dành cho b?n. G?i s? [phone].         [facility] complies with applicable Federal civil rights laws and does not discriminate on the basis of race, color, national origin, age, disability, or sex.

## 2017-04-08 DIAGNOSIS — Z90.89 MYASTHENIA GRAVIS STATUS POST THYMECTOMY: ICD-10-CM

## 2017-04-08 DIAGNOSIS — G70.00 MYASTHENIA GRAVIS STATUS POST THYMECTOMY: ICD-10-CM

## 2017-04-09 RX ORDER — PREDNISONE 5 MG/1
TABLET ORAL
Qty: 90 TABLET | Refills: 0 | OUTPATIENT
Start: 2017-04-09

## 2017-04-10 ENCOUNTER — OFFICE VISIT (OUTPATIENT)
Dept: OPHTHALMOLOGY | Facility: CLINIC | Age: 77
End: 2017-04-10
Payer: MEDICARE

## 2017-04-10 ENCOUNTER — PATIENT MESSAGE (OUTPATIENT)
Dept: ENDOCRINOLOGY | Facility: CLINIC | Age: 77
End: 2017-04-10

## 2017-04-10 DIAGNOSIS — E11.3513 DIABETIC MACULAR EDEMA OF BOTH EYES WITH PROLIFERATIVE RETINOPATHY ASSOCIATED WITH TYPE 2 DIABETES MELLITUS: Primary | ICD-10-CM

## 2017-04-10 DIAGNOSIS — H43.813 POSTERIOR VITREOUS DETACHMENT OF BOTH EYES: ICD-10-CM

## 2017-04-10 DIAGNOSIS — H35.373 EPIRETINAL MEMBRANE, BILATERAL: ICD-10-CM

## 2017-04-10 DIAGNOSIS — Z12.11 SPECIAL SCREENING FOR MALIGNANT NEOPLASMS, COLON: Primary | ICD-10-CM

## 2017-04-10 PROCEDURE — 92014 COMPRE OPH EXAM EST PT 1/>: CPT | Mod: 25,S$PBB,, | Performed by: OPHTHALMOLOGY

## 2017-04-10 PROCEDURE — 99999 PR PBB SHADOW E&M-EST. PATIENT-LVL III: CPT | Mod: PBBFAC,,, | Performed by: OPHTHALMOLOGY

## 2017-04-10 PROCEDURE — 67028 INJECTION EYE DRUG: CPT | Mod: S$PBB,LT,, | Performed by: OPHTHALMOLOGY

## 2017-04-10 PROCEDURE — 99213 OFFICE O/P EST LOW 20 MIN: CPT | Mod: PBBFAC | Performed by: OPHTHALMOLOGY

## 2017-04-10 PROCEDURE — 92134 CPTRZ OPH DX IMG PST SGM RTA: CPT | Mod: PBBFAC | Performed by: OPHTHALMOLOGY

## 2017-04-10 PROCEDURE — C9257 BEVACIZUMAB INJECTION: HCPCS | Mod: PBBFAC | Performed by: OPHTHALMOLOGY

## 2017-04-10 PROCEDURE — 67028 INJECTION EYE DRUG: CPT | Mod: PBBFAC | Performed by: OPHTHALMOLOGY

## 2017-04-10 RX ORDER — POLYETHYLENE GLYCOL 3350, SODIUM SULFATE ANHYDROUS, SODIUM BICARBONATE, SODIUM CHLORIDE, POTASSIUM CHLORIDE 236; 22.74; 6.74; 5.86; 2.97 G/4L; G/4L; G/4L; G/4L; G/4L
4 POWDER, FOR SOLUTION ORAL ONCE
Qty: 4000 ML | Refills: 0 | Status: SHIPPED | OUTPATIENT
Start: 2017-04-10 | End: 2017-04-10

## 2017-04-10 RX ADMIN — BEVACIZUMAB 1.25 MG: 100 INJECTION, SOLUTION INTRAVENOUS at 10:04

## 2017-04-10 NOTE — PATIENT INSTRUCTIONS

## 2017-04-10 NOTE — MR AVS SNAPSHOT
[facility] - Ophthalmology  [address]  [place] [address]  Phone: [phone]  Fax: [phone]                  Teresita Tolbert   4/10/2017 9:20 AM   Office Visit    Description:  Female : 1940   Provider:  JAMES Olsen MD   Department:  [facility] - Ophthalmology           Reason for Visit     Diabetic Eye Exam           Diagnoses this Visit        Comments    Diabetic macular edema of both eyes with proliferative retinopathy associated with type 2 diabetes mellitus    -  Primary     Posterior vitreous detachment of both eyes         Epiretinal membrane, bilateral                To Do List           Future Appointments        Provider Department Dept Phone    2017 8:15 AM LAB, SAME DAY [facility] [phone]    2017 9:00 AM Nico Squires MD [facility] Rheumatology [phone]    2017 11:15 AM Meet Barrera MD [facility] Internal Medicine [phone]      Your Future Surgeries/Procedures     May 05, 2017   Surgery with Kamilla Alcocer MD   [facility]    [address] [address] [phone]              Goals (5 Years of Data)     None      Follow-Up and Disposition     Return in about 4 weeks (around 2017).      [facility]     [facility] Care [facility] -  Assistance  Unless otherwise directed by your provider, please contact [facility], our nurse care line that is available for  assistance.     Registered nurses in the [facility] provide: appointment scheduling, clinical advisement, health education, and other advisory services.  Call: [phone] (toll free)               Medications           Message regarding Medications     Verify the changes and/or additions to your medication regime listed below are the same as discussed with your clinician today.  If any of these changes or additions are incorrect, please  notify your healthcare provider.        These medications were administered today        Dose Freq    bevacizumab (AVASTIN) 2.5 mg/0.10 mL 1.25 mg 1.25 mg Clinic/HOD 1 time    Sig: Inject 0.05 mLs (1.25 mg total) into the eye one time.    Class: Normal    Route: Intraocular           Verify that the below list of medications is an accurate representation of the medications you are currently taking.  If none reported, the list may be blank. If incorrect, please contact your healthcare provider. Carry this list with you in case of emergency.           Current Medications     amlodipine (NORVASC) 5 MG tablet Take 1 tablet (5 mg total) by mouth once daily.    aspirin (ECOTRIN) 81 MG EC tablet Take 81 mg by mouth once daily.      atenolol (TENORMIN) 50 MG tablet Take 2 tablets (100 mg total) by mouth once daily.    bismuth subsalicylate (PEPTO BISMOL) 262 mg/15 mL suspension Take 15 mLs by mouth every 6 (six) hours as needed.    BLOOD SUGAR DIAGNOSTIC (ONE TOUCH ULTRA TEST [facility]) * * * As directed.  test sugars 2 times daily    blood sugar diagnostic (ONETOUCH ULTRA TEST) Strp USE TO TEST BLOOD SUGAR2 TIMES DAILY    calcium-vitamin D3 500 mg(1,250mg) -200 unit per tablet Take 1 tablet by mouth once daily.    ferrous sulfate 325 mg (65 mg iron) Tab tablet Take 1 tablet (325 mg total) by mouth daily with breakfast.    folic acid (FOLVITE) 1 MG tablet TAKE 1 TABLET BY MOUTH EVERY DAY    gentamicin (GARAMYCIN) 0.3 % (3 mg/gram) ophthalmic ointment Place 0.5 inches into both eyes every evening.    methotrexate 2.5 MG Tab TAKE 4 TABLETS BY MOUTH EVERY SEVEN DAYS    multivitamin capsule Take 1 capsule by mouth once daily.      ONETOUCH ULTRA TEST Strp USE TO TEST THREE TIMES DAILY.    polyethylene glycol (GOLYTELY,NULYTELY) 236-22.74-6.74 -5.86 gram suspension Take 4,000 mLs (4 L total) by mouth once.    potassium chloride (KLOR-CON) 10 MEQ TbSR TAKE 1 TABLET BY MOUTH DAILY    predniSONE (DELTASONE) 5 MG tablet Take 2 tablets  (10 mg total) by mouth every other day.    sitagliptan-metformin (JANUMET)  mg per tablet Take 1 tablet by mouth 2 (two) times daily.           Clinical Reference Information           Allergies as of 4/10/2017     Erythromycin    Sulfa (Sulfonamide Antibiotics)      Immunizations Administered on Date of Encounter - 4/10/2017     None      Orders Placed During Today's Visit      Normal Orders This Visit    OCT- Retina     Prior Authorization Order     Future Labs/Procedures Expected by Expires    Fluorescein Angiography - OU - Both Eyes  As directed 4/10/2018      Language Assistance Services     ATTENTION: Language assistance services are available, free of charge. Please call [phone].      ATENCIÓN: Si alejandrinala giovanna, tiene a yoo disposición servicios gratuitos de asistencia lingüística. Llame al [phone].     BETZAIDA Ý: N?u b?n nói Ti?ng Vi?t, có các d?ch v? h? tr? ngôn ng? mi?n phí dành cho b?n. G?i s? [phone].         [facility] complies with applicable Federal civil rights laws and does not discriminate on the basis of race, color, national origin, age, disability, or sex.

## 2017-04-10 NOTE — PROGRESS NOTES
OCT - DME OU - good focal OU  Increased ME OS>OD    Prior FA - NVE OU with NP OU      A/P    1-PDR OS>OD:   - S/p PRP OD (06/16/15)   - S/p PRP OS (07/07/15)   - Last HbA1c was 6.4 on 4/8/15   - Emphasized the importance of tight glucose control    2-DME OU   - s/p focal OU   -  Increase in cystoid fluid OS    Resume Avastin OS today    3-PCIOL OU    4-Ectropion OU:  Saw Mary for repair        1 month OCT/FA OS      Risks, benefits, and alternatives to treatment discussed in detail with the patient.  The patient voiced understanding and wished to proceed with the procedure    Injection Procedure Note:  Diagnosis: DME OS    Topical Proparacaine and Betadine.  Inject Avastin OS at 6:00 @ 3.5-4mm posterior to limbus  Post Operative Dx: Same  Complications: None  Follow up as above.

## 2017-04-12 DIAGNOSIS — Z90.89 MYASTHENIA GRAVIS STATUS POST THYMECTOMY: ICD-10-CM

## 2017-04-12 DIAGNOSIS — G70.00 MYASTHENIA GRAVIS STATUS POST THYMECTOMY: ICD-10-CM

## 2017-04-12 RX ORDER — PREDNISONE 5 MG/1
10 TABLET ORAL EVERY OTHER DAY
Qty: 90 TABLET | Refills: 1 | Status: SHIPPED | OUTPATIENT
Start: 2017-04-12 | End: 2017-07-06 | Stop reason: SDUPTHER

## 2017-04-12 RX ORDER — PREDNISONE 5 MG/1
10 TABLET ORAL EVERY OTHER DAY
Qty: 90 TABLET | Refills: 1 | OUTPATIENT
Start: 2017-04-12

## 2017-04-12 NOTE — TELEPHONE ENCOUNTER
----- Message from Laurence [place] sent at 4/12/2017  2:09 PM CDT -----  Contact: Patient [phone]  Patient is requesting a refill on her predniSONE (DELTASONE) 5 MG tablet, patient says she is being told that her refill is being denied. Please call patient. Patients son needs to speak to nurse in regards to wether mom can take her medication patient has an colonoscopy.        [facility] [address] [place] [address] AT [address] [address]  Phone: [phone] Fax: [phone]

## 2017-04-12 NOTE — TELEPHONE ENCOUNTER
Discussed with the patient.  [facility] refused to refill her prednisone Rx, even after having acknowledged receiving it on 3/9/17 @ 1:30 pm.  I called [facility] to speak with a Pharmacist, but they did not answer, so I left a message repeating the refill instructions for a 90 day Rx and 1 refill.

## 2017-04-13 RX ORDER — PREDNISONE 5 MG/1
TABLET ORAL
Qty: 90 TABLET | Refills: 0 | OUTPATIENT
Start: 2017-04-13

## 2017-04-26 RX ORDER — POTASSIUM CHLORIDE 750 MG/1
TABLET, EXTENDED RELEASE ORAL
Qty: 30 TABLET | Refills: 0 | Status: SHIPPED | OUTPATIENT
Start: 2017-04-26 | End: 2017-05-31 | Stop reason: SDUPTHER

## 2017-05-05 ENCOUNTER — SURGERY (OUTPATIENT)
Age: 77
End: 2017-05-05

## 2017-05-05 ENCOUNTER — HOSPITAL ENCOUNTER (OUTPATIENT)
Facility: HOSPITAL | Age: 77
Discharge: HOME | End: 2017-05-05
Attending: INTERNAL MEDICINE | Admitting: INTERNAL MEDICINE
Payer: MEDICARE

## 2017-05-05 ENCOUNTER — ANESTHESIA (OUTPATIENT)
Dept: ENDOSCOPY | Facility: HOSPITAL | Age: 77
End: 2017-05-05
Payer: MEDICARE

## 2017-05-05 ENCOUNTER — ANESTHESIA EVENT (OUTPATIENT)
Dept: ENDOSCOPY | Facility: HOSPITAL | Age: 77
End: 2017-05-05
Payer: MEDICARE

## 2017-05-05 VITALS
HEIGHT: 61 IN | BODY MASS INDEX: 24.55 KG/M2 | HEART RATE: 90 BPM | RESPIRATION RATE: 18 BRPM | OXYGEN SATURATION: 99 % | TEMPERATURE: 99 F | DIASTOLIC BLOOD PRESSURE: 62 MMHG | WEIGHT: 130 LBS | SYSTOLIC BLOOD PRESSURE: 156 MMHG

## 2017-05-05 DIAGNOSIS — D50.9 IRON DEFICIENCY ANEMIA, UNSPECIFIED IRON DEFICIENCY ANEMIA TYPE: ICD-10-CM

## 2017-05-05 DIAGNOSIS — D50.9 IDA (IRON DEFICIENCY ANEMIA): ICD-10-CM

## 2017-05-05 DIAGNOSIS — K63.5 POLYP OF ASCENDING COLON, UNSPECIFIED TYPE: Primary | ICD-10-CM

## 2017-05-05 LAB
POCT GLUCOSE: 176 MG/DL (ref 70–110)
POCT GLUCOSE: 185 MG/DL (ref 70–110)

## 2017-05-05 PROCEDURE — D9220A PRA ANESTHESIA: Mod: CRNA,,, | Performed by: NURSE ANESTHETIST, CERTIFIED REGISTERED

## 2017-05-05 PROCEDURE — 43235 EGD DIAGNOSTIC BRUSH WASH: CPT | Performed by: INTERNAL MEDICINE

## 2017-05-05 PROCEDURE — D9220A PRA ANESTHESIA: Mod: ANES,,, | Performed by: ANESTHESIOLOGY

## 2017-05-05 PROCEDURE — 45380 COLONOSCOPY AND BIOPSY: CPT | Mod: 59,,, | Performed by: INTERNAL MEDICINE

## 2017-05-05 PROCEDURE — 88305 TISSUE EXAM BY PATHOLOGIST: CPT | Mod: 26,,, | Performed by: PATHOLOGY

## 2017-05-05 PROCEDURE — 25000003 PHARM REV CODE 250: Performed by: NURSE ANESTHETIST, CERTIFIED REGISTERED

## 2017-05-05 PROCEDURE — 27201089 HC SNARE, DISP (ANY): Performed by: INTERNAL MEDICINE

## 2017-05-05 PROCEDURE — 27201012 HC FORCEPS, HOT/COLD, DISP: Performed by: INTERNAL MEDICINE

## 2017-05-05 PROCEDURE — 37000008 HC ANESTHESIA 1ST 15 MINUTES: Performed by: INTERNAL MEDICINE

## 2017-05-05 PROCEDURE — 45385 COLONOSCOPY W/LESION REMOVAL: CPT | Performed by: INTERNAL MEDICINE

## 2017-05-05 PROCEDURE — 45385 COLONOSCOPY W/LESION REMOVAL: CPT | Mod: ,,, | Performed by: INTERNAL MEDICINE

## 2017-05-05 PROCEDURE — 45380 COLONOSCOPY AND BIOPSY: CPT | Performed by: INTERNAL MEDICINE

## 2017-05-05 PROCEDURE — 25000003 PHARM REV CODE 250: Performed by: INTERNAL MEDICINE

## 2017-05-05 PROCEDURE — 88305 TISSUE EXAM BY PATHOLOGIST: CPT | Performed by: PATHOLOGY

## 2017-05-05 PROCEDURE — 63600175 PHARM REV CODE 636 W HCPCS: Performed by: NURSE ANESTHETIST, CERTIFIED REGISTERED

## 2017-05-05 PROCEDURE — 43235 EGD DIAGNOSTIC BRUSH WASH: CPT | Mod: 51,,, | Performed by: INTERNAL MEDICINE

## 2017-05-05 PROCEDURE — 37000009 HC ANESTHESIA EA ADD 15 MINS: Performed by: INTERNAL MEDICINE

## 2017-05-05 RX ORDER — LIDOCAINE HCL/PF 100 MG/5ML
SYRINGE (ML) INTRAVENOUS
Status: DISCONTINUED | OUTPATIENT
Start: 2017-05-05 | End: 2017-05-05

## 2017-05-05 RX ORDER — PROPOFOL 10 MG/ML
VIAL (ML) INTRAVENOUS CONTINUOUS PRN
Status: DISCONTINUED | OUTPATIENT
Start: 2017-05-05 | End: 2017-05-05

## 2017-05-05 RX ORDER — SODIUM CHLORIDE 9 MG/ML
INJECTION, SOLUTION INTRAVENOUS CONTINUOUS
Status: DISCONTINUED | OUTPATIENT
Start: 2017-05-05 | End: 2017-05-05 | Stop reason: HOSPADM

## 2017-05-05 RX ORDER — PROPOFOL 10 MG/ML
VIAL (ML) INTRAVENOUS
Status: DISCONTINUED | OUTPATIENT
Start: 2017-05-05 | End: 2017-05-05

## 2017-05-05 RX ADMIN — PROPOFOL 100 MCG/KG/MIN: 10 INJECTION, EMULSION INTRAVENOUS at 10:05

## 2017-05-05 RX ADMIN — PROPOFOL 40 MG: 10 INJECTION, EMULSION INTRAVENOUS at 10:05

## 2017-05-05 RX ADMIN — SODIUM CHLORIDE: 0.9 INJECTION, SOLUTION INTRAVENOUS at 10:05

## 2017-05-05 RX ADMIN — LIDOCAINE HYDROCHLORIDE 100 MG: 20 INJECTION, SOLUTION INTRAVENOUS at 10:05

## 2017-05-05 NOTE — H&P (VIEW-ONLY)
[facility] Note    Reason for Visit:  The primary encounter diagnosis was Anemia, unspecified type. A diagnosis of Gastroesophageal reflux disease, esophagitis presence not specified was also pertinent to this visit.    PCP:   Meet Barrera   [address] / [place] [address]    Referring MD:  Meet Barrera Md  [address] [place] [address]    HPI:  This is a 76 y.o. female here for evaluation of anemia. Ms. Tolbert is new to the clinic and here with her son. Pmhx of htn, myasthenia gravis, and diabetes.    She was worked up by PCP for anemia. Occult stool study positive 3/28/17. Last hgb 10.3 on 3/24/17. Has a normal formed stool daily. Denies hematochezia, hematemesis, melena, BRBPR, black/tarry stools, and coffee ground emesis. Denies CP, SOB, and Fatigue. No prior hx of and EGD and colonoscopy.     Pt reports 2x/month indigestion and substernal pain and will take Pepto Bismol and relief. Occurs with specific food triggers- spicy food or red sauces. Denies dysphagia, odynophagia, and abdominal pain. NSAID usage- Aspirin 81 mg daily and takes with food.     ROS:  Constitutional: No fevers, no chills, No unintentional weight loss, + fatigue   ENT: No allergies  CV: No chest pain, no palpitations, no perif. 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    Surgical History:  has a past surgical history that includes Cataract extraction; s/p focal laser (6-26-12); s/p avastin ou (5-22-12); and s/p avastin  (6-26-12).    Family History: family history includes Diabetes in her mother. There is no history of Celiac disease, Colon polyps, Colon cancer, Esophageal cancer, Inflammatory bowel disease, Irritable bowel syndrome, or Stomach cancer..     Social History:  reports that she quit smoking about 41 years ago. She has never used smokeless tobacco. She reports that she does not drink alcohol or use illicit drugs.    Review of patient's allergies indicates:   Allergen Reactions    Erythromycin Shortness Of Breath     All mycins per patient  -affects myasthenia gravis    Sulfa (sulfonamide antibiotics)        Current Outpatient Prescriptions   Medication Sig    amlodipine (NORVASC) 5 MG tablet Take 1 tablet (5 mg total) by mouth once daily.    aspirin (ECOTRIN) 81 MG EC tablet Take 81 mg by mouth once daily.      atenolol (TENORMIN) 50 MG tablet Take 2 tablets (100 mg total) by mouth once daily.    bismuth subsalicylate (PEPTO BISMOL) 262 mg/15 mL suspension Take 15 mLs by mouth every 6 (six) hours as needed.    BLOOD SUGAR DIAGNOSTIC (ONE TOUCH ULTRA TEST [facility]) * * * As directed.  test sugars 2 times daily    blood sugar diagnostic (ONETOUCH ULTRA TEST) Strp USE TO TEST BLOOD SUGAR2 TIMES DAILY    calcium-vitamin D3 500 mg(1,250mg) -200 unit per tablet Take 1 tablet by mouth once daily.    ferrous sulfate 325 mg (65 mg iron) Tab tablet Take 1 tablet (325 mg total) by mouth daily with breakfast.    folic acid (FOLVITE) 1 MG tablet TAKE 1 TABLET BY MOUTH EVERY DAY    gentamicin (GARAMYCIN) 0.3 % (3 mg/gram) ophthalmic ointment Place 0.5 inches into both eyes every evening.    methotrexate 2.5 MG Tab TAKE 4 TABLETS BY MOUTH EVERY SEVEN DAYS    multivitamin capsule Take 1 capsule by mouth once daily.       "ONETOUCH ULTRA TEST Strp USE TO TEST THREE TIMES DAILY.    potassium chloride (KLOR-CON) 10 MEQ TbSR TAKE 1 TABLET BY MOUTH DAILY    predniSONE (DELTASONE) 5 MG tablet Take 2 tablets (10 mg total) by mouth every other day.    sitagliptan-metformin (JANUMET)  mg per tablet Take 1 tablet by mouth 2 (two) times daily.     Current Facility-Administered Medications   Medication    denosumab (PROLIA) injection 60 mg     Objective Findings:    Vital Signs:  BP (!) 174/78  Pulse 77  Ht 5' 2" (1.575 m)  Wt 59 kg (130 lb 1.1 oz)  BMI 23.79 kg/m2  Body mass index is 23.79 kg/(m^2).    Did not take Blood pressure medication this morning.     Physical Exam:  General Appearance: Well appearing in no acute distress and using cane for walking long distance  Head: Normocephalic, without obvious abnormality  Eyes: No scleral icterus, EOMI  ENT: Neck supple, Lips, mucosa, and tongue normal; teeth and gums normal  Lungs: CTA bilaterally in anterior and posterior fields, no wheezes, no crackles.  Heart: Regular rate and rhythm, no murmurs heard  Abdomen: Soft, non tender, non distended with positive bowel sounds in all four quadrants.  Extremities: No clubbing, cyanosis or edema  Skin: No rash to exposed areas  Neurologic: AAOx4    Labs:  Lab Results   Component Value Date    WBC 8.30 03/24/2017    HGB 10.3 (L) 03/24/2017    HCT 33.9 (L) 03/24/2017     03/24/2017    CHOL 143 03/24/2017    TRIG 137 03/24/2017    HDL 42 03/24/2017    ALT 20 02/03/2017    AST 19 02/03/2017     02/03/2017    K 3.7 02/03/2017     02/03/2017    CREATININE 0.9 02/03/2017    BUN 14 02/03/2017    CO2 26 02/03/2017    TSH 1.717 03/24/2017    GLUF 125 (H) 03/28/2006    HGBA1C 7.1 (H) 03/24/2017     Endoscopy:    EGD- none    Colonoscopy- none    Assessment:  1. Anemia, unspecified type    2. Gastroesophageal reflux disease, esophagitis presence not specified      Recommendations:  1. Schedule EGD and colonoscopy to rule out GI " bleed. CBC, Iron studies, and occult stools studies collected 3/28/17.  2. Continue to take Pepto Bismol as needed. If Gerd symptoms increase to greater than twice per week will discuss a daily PPI and pt understood.     Follow up after EGD and Colonoscopy.     Order summary:  Orders Placed This Encounter    Case request GI: COLONOSCOPY, ESOPHAGOGASTRODUODENOSCOPY (EGD)     Thank you so much for allowing me to participate in the care of BERNARDINO Alvarez, FNP-C

## 2017-05-05 NOTE — PROGRESS NOTES
Dr. Salinas at bedside explaining results of procedure to patient and family. All questions answered.  Dr. Leigh notified for a release.

## 2017-05-05 NOTE — IP AVS SNAPSHOT
[facility]  [address]  [facility] [address]  Phone: [phone]           Patient Discharge Instructions   Our goal is to set you up for success. This packet includes information on your condition, medications, and your home care.  It will help you care for yourself to prevent having to return to the hospital.     Please ask your nurse if you have any questions.      There are many details to remember when preparing to leave the hospital. Here is what you will need to do:    1. Take your medicine. If you are prescribed medications, review your Medication List on the following pages. You may have new medications to  at the pharmacy and others that you'll need to stop taking. Review the instructions for how and when to take your medications. Talk with your doctor or nurses if you are unsure of what to do.     2. Go to your follow-up appointments. Specific follow-up information is listed in the following pages. Your may be contacted by a nurse or clinical provider about future appointments. Be sure we have all of the phone numbers to reach you. Please contact your provider's office if you are unable to make an appointment.     3. Watch for warning signs. Your doctor or nurse will give you detailed warning signs to watch for and when to call for assistance. These instructions may also include educational information about your condition. If you experience any of warning signs to your health, call your doctor.           [facility]  Unless otherwise directed by your provider, please   contact [facility], our nurse care line   that is available for 24/7 assistance.     [phone] (toll-free)     Registered nurses in the [facility]   provide: appointment scheduling, clinical advisement, health education, and other advisory services.                  ** Verify the list of medication(s) below is accurate and up to date. Carry this with you in case of  emergency. If your medications have changed, please notify your healthcare provider.             Medication List      CONTINUE taking these medications        Additional Info                      amlodipine 5 MG tablet   Commonly known as:  NORVASC   Quantity:  30 tablet   Refills:  11   Dose:  5 mg    Instructions:  Take 1 tablet (5 mg total) by mouth once daily.     Begin Date    AM    Noon    PM    Bedtime       aspirin 81 MG EC tablet   Commonly known as:  ECOTRIN   Refills:  0   Dose:  81 mg    Instructions:  Take 81 mg by mouth once daily.     Begin Date    AM    Noon    PM    Bedtime       atenolol 50 MG tablet   Commonly known as:  TENORMIN   Quantity:  60 tablet   Refills:  11   Dose:  100 mg    Instructions:  Take 2 tablets (100 mg total) by mouth once daily.     Begin Date    AM    Noon    PM    Bedtime       bismuth subsalicylate 262 mg/15 mL suspension   Commonly known as:  PEPTO BISMOL   Refills:  0   Dose:  15 mL    Instructions:  Take 15 mLs by mouth every 6 (six) hours as needed.     Begin Date    AM    Noon    PM    Bedtime       calcium-vitamin D3 500 mg(1,250mg) -200 unit per tablet   Refills:  0   Dose:  1 tablet    Instructions:  Take 1 tablet by mouth once daily.     Begin Date    AM    Noon    PM    Bedtime       ferrous sulfate 325 mg (65 mg iron) Tab tablet   Quantity:  30 tablet   Refills:  3   Dose:  325 mg    Instructions:  Take 1 tablet (325 mg total) by mouth daily with breakfast.     Begin Date    AM    Noon    PM    Bedtime       folic acid 1 MG tablet   Commonly known as:  FOLVITE   Quantity:  90 tablet   Refills:  0    Instructions:  TAKE 1 TABLET BY MOUTH EVERY DAY     Begin Date    AM    Noon    PM    Bedtime       gentamicin 0.3 % (3 mg/gram) ophthalmic ointment   Commonly known as:  GARAMYCIN   Quantity:  1 Tube   Refills:  1   Dose:  0.5 inch    Instructions:  Place 0.5 inches into both eyes every evening.     Begin Date    AM    Noon    PM    Bedtime       methotrexate 2.5 MG  Tab   Quantity:  20 tablet   Refills:  2    Instructions:  TAKE 4 TABLETS BY MOUTH EVERY SEVEN DAYS     Begin Date    AM    Noon    PM    Bedtime       multivitamin capsule   Refills:  0   Dose:  1 capsule    Instructions:  Take 1 capsule by mouth once daily.     Begin Date    AM    Noon    PM    Bedtime       * ONE TOUCH ULTRA TEST MISC   Refills:  0    Instructions:  * * * As directed.  test sugars 2 times daily     Begin Date    AM    Noon    PM    Bedtime       * ONETOUCH ULTRA TEST Strp   Quantity:  300 strip   Refills:  0   Generic drug:  blood sugar diagnostic    Instructions:  USE TO TEST THREE TIMES DAILY.     Begin Date    AM    Noon    PM    Bedtime       * blood sugar diagnostic Strp   Commonly known as:  ONETOUCH ULTRA TEST   Quantity:  150 each   Refills:  12    Instructions:  USE TO TEST BLOOD SUGAR2 TIMES DAILY     Begin Date    AM    Noon    PM    Bedtime       potassium chloride 10 MEQ Tbsr   Commonly known as:  KLOR-CON   Quantity:  30 tablet   Refills:  0    Instructions:  TAKE 1 TABLET BY MOUTH DAILY     Begin Date    AM    Noon    PM    Bedtime       predniSONE 5 MG tablet   Commonly known as:  DELTASONE   Quantity:  90 tablet   Refills:  1   Dose:  10 mg    Instructions:  Take 2 tablets (10 mg total) by mouth every other day.     Begin Date    AM    Noon    PM    Bedtime       SITagliptan-metformin  mg per tablet   Commonly known as:  JANUMET   Quantity:  60 tablet   Refills:  12   Dose:  1 tablet    Instructions:  Take 1 tablet by mouth 2 (two) times daily.     Begin Date    AM    Noon    PM    Bedtime       * Notice:  This list has 3 medication(s) that are the same as other medications prescribed for you. Read the directions carefully, and ask your doctor or other care provider to review them with you.               Please bring to all follow up appointments:    1. A copy of your discharge instructions.  2. All medicines you are currently taking in their original  bottles.  3. Identification and insurance card.    Please arrive 15 minutes ahead of scheduled appointment time.    Please call 24 hours in advance if you must reschedule your appointment and/or time.        Your Scheduled Appointments     May 15, 2017 11:00 AM CDT   GASTROENTEROLOGY ESTABLISHED PATIENT with JOY Shannon karishma - Gastroenterology ([facility] )    [address]  [place] [id number] [phone]            May 16, 2017 10:10 AM CDT   Procedure with MD Ayan Sanders karishma - Ophthalmology ([facility] )    [address]  [place] [id number] [phone]            May 24, 2017  8:15 AM CDT   Non-Fasting Lab with LAB, SAME DAY   [facility] ([facility])    [address] [facility] [id number] [phone]            May 24, 2017  9:00 AM CDT   Established Patient Visit with MD Ayan Quiros [facility] - Rheumatology ([facility] )    [address]  [place] [id number] [phone]            May 31, 2017 11:15 AM CDT   Established Patient Visit with Meet Barrera MD   [facility] ([facility])    [address]  [place] [id number] [phone]              Follow-up Information     Follow up with Greg Salinas MD.    Specialty:  Gastroenterology    Contact information:    [address] [place] [id number] [phone]          Discharge Instructions     Future Orders    Activity as tolerated     Call MD for:  difficulty breathing, headache or visual disturbances     Call MD for:  persistent nausea and vomiting     Call MD for:  severe uncontrolled pain     Call MD for:  temperature >100.4     Diet general     Questions:    Total calories:      Fat restriction, if any:      Protein restriction, if any:      Na restriction, if any:      Fluid restriction:      Additional  restrictions:          Discharge Instructions         Colonoscopy     A camera attached to a flexible tube with a viewing lens is used to take video pictures.     Colonoscopy is a test to view the inside of your lower digestive tract (colon and rectum). Sometimes it can show the last part of the small intestine (ileum). During the test, small pieces of tissue may be removed for testing. This is called a biopsy. Small growths, such as polyps, may also be removed.   Why is colonoscopy done?  The test is done to help look for colon cancer. And it can help find the source of abdominal pain, bleeding, and changes in bowel habits. It may be needed once a year, depending on factors such as your:  · Age  · Health history  · Family health history  · Symptoms  · Results from any prior colonoscopy  Risks and possible complications  These include:  · Bleeding               · A puncture or tear in the colon   · Risks of anesthesia  · A cancer lesion not being seen  Getting ready   To prepare for the test:  · Talk with your healthcare provider about the risks of the test (see below). Also ask your healthcare provider about alternatives to the test.  · Tell your healthcare provider about any medicines you take. Also tell him or her about any health conditions you may have.  · Make sure your rectum and colon are empty for the test. Follow the diet and bowel prep instructions exactly. If you dont, the test may need to be rescheduled.  · Plan for a friend or family member to drive you home after the test.     Colonoscopy provides an inside view of the entire colon.     You may discuss the results with your doctor right away or at a future visit.  During the test   The test is usually done in the hospital on an outpatient basis. This means you go home the same day. The procedure takes about 30 minutes. During that time:  · You are given relaxing (sedating) medicine through an IV line. You may be drowsy, or fully asleep.  · The  healthcare provider will first give you a physical exam to check for anal and rectal problems.  · Then the anus is lubricated and the scope inserted.  · If you are awake, you may have a feeling similar to needing to have a bowel movement. You may also feel pressure as air is pumped into the colon. Its OK to pass gas during the procedure.  · Biopsy, polyp removal, or other treatments may be done during the test.  After the test   You may have gas right after the test. It can help to try to pass it to help prevent later bloating. Your healthcare provider may discuss the results with you right away. Or you may need to schedule a follow-up visit to talk about the results. After the test, you can go back to your normal eating and other activities. You may be tired from the sedation and need to rest for a few hours.  Date Last Reviewed: 11/1/2016  © 9474-9745 [employer]. [address] [place] [address]. All rights reserved. This information is not intended as a substitute for professional medical care. Always follow your healthcare professional's instructions.        Upper GI Endoscopy     During endoscopy, a long, flexible tube is used to view the inside of your upper GI tract.      Upper GI endoscopy allows your healthcare provider to look directly into the beginning of your gastrointestinal (GI) tract. The esophagus, stomach, and duodenum (the first part of the small intestine) make up the upper GI tract.   Before the exam  Follow these and any other instructions you are given before your endoscopy. If you dont follow the healthcare providers instructions carefully, the test may need to be canceled or done over:  · Don't eat or drink anything after midnight the night before your exam. If your exam is in the afternoon, drink only clear liquids in the morning. Don't eat or drink anything for 8 hours before the exam. In some cases, you may be able to take medicines with sips of water until 2  hours before the procedure. Speak with your healthcare provider about this.   · Bring your X-rays and any other test results you have.  · Because you will be sedated, arrange for an adult to drive you home after the exam.  · Tell your healthcare provider before the exam if you are taking any medicines or have any medical problems.  The procedure  Here is what to expect:  · You will lie on the endoscopy table. Usually patients lie on the left side.  · You will be monitored and given oxygen.  · Your throat may be numbed with a spray or gargle. You are given medicine through an intravenous (IV) line that will help you relax and remain comfortable. You may be awake or asleep during the procedure.  · The healthcare provider will put the endoscope in your mouth and down your esophagus. It is thinner than most pieces of food that you swallow. It will not affect your breathing. The medicine helps keep you from gagging.  · Air is put into your GI tract to expand it. It can make you burp.  · During the procedure, the healthcare provider can take biopsies (tissue samples), remove abnormalities, such as polyps, or treat abnormalities through a variety of devices placed through the endoscope. You will not feel this.   · The endoscope carries images of your upper GI tract to a video screen. If you are awake, you may be able to look at the images.  · After the procedure is done, you will rest for a time. An adult must drive you home.  When to call your healthcare provider  Contact your healthcare provider if you have:  · Black or tarry stools, or blood in your stool  · Fever  · Pain in your belly that does not go away  · Nausea and vomiting, or vomiting blood   Date Last Reviewed: 7/1/2016  © 1109-0785 [employer]. [address] [place] [address], [place] [address]. All rights reserved. This information is not intended as a substitute for professional medical care. Always follow your healthcare professional's  instructions.          Instructions    Discharge Summary/Instructions for after Colonoscopy with   Biopsy/Polypectomy  Patient Name: Teresita Tolbert  Patient MRN: [id number]  Patient YOB: 1940  Friday, May 05, 2017    Greg Salinas MD  RESTRICTIONS ON ACTIVITY:  - Do not drive a car or operate machinery until the day after the procedure.      - The following day: return to full activity including work.  - For  3 days: No heavy lifting, straining or running.  - Diet: Eat and drink normally unless instructed otherwise.  TREATMENT FOR COMMON SIDE EFFECTS:  - Mild abdominal pain and bloating or excessive gas: rest, eat lightly and   use a heating pad.  SYMPTOMS TO WATCH FOR AND REPORT TO YOUR PHYSICIAN:  1. Severe abdominal pain.  2. Fever within 24 hours after a procedure.  3. A large amount of rectal bleeding. (A small amount of blood from the   rectum is not serious, especially if hemorrhoids are present.  4. Because air was put into your colon during the procedure, expelling large   amounts of air from your rectum is normal.  5. You may not have a bowel movement for 1-3 days because of the colonoscopy   prep.  This is normal.  6. Go directly to the emergency room if you notice any of the following:   Chills and/or fever over 101   Persistent vomiting   Severe abdominal pain, other than gas cramps   Severe chest pain   Black, tarry stools   Any bleeding - exceeding one tablespoon  Your doctor recommends these additional instructions:  If any biopsies were performed, my office will call you in 5 to 6 business   days with any results.  - Discharge patient to home.   - Patient has a contact number available for emergencies.  The signs and   symptoms of potential delayed complications were discussed with the   patient.  Return to normal activities tomorrow.  Written discharge   instructions were provided to the patient.   - Resume previous diet.   - Continue present medications.   - Await pathology results.   -  Telephone GI clinic for pathology results in 2 weeks.   - Repeat colonoscopy at appointment to be scheduled for retreatment.  I will   arrange for this to be done with Dr. Hale, advanced endoscopist.  You are being discharged to home.   You have a contact number available for emergencies.  The signs and symptoms   of potential delayed complications were discussed with you.  You may return   to normal activities tomorrow.  Written discharge instructions were   provided to you.   Resume your previous diet.   Continue your present medications.   We are waiting for your pathology results.   Telephone your GI clinic for pathology results in two weeks.   Your physician has recommended a repeat colonoscopy at appointment to be   scheduled for retreatment.  Discharge patient to home.   Patient has a contact number available for emergencies.  The signs and   symptoms of potential delayed complications were discussed with the   patient.  Return to normal activities tomorrow.  Written discharge   instructions were provided to the patient.   Resume previous diet.   Continue present medications.   Await pathology results.   Telephone GI clinic for pathology results in 2 weeks.   Repeat colonoscopy at appointment to be scheduled for retreatment.  If you have any questions or problems, please call your physician - Greg Salinas MD at Work:  [phone].    LAB RESULTS: [phone]  [facility], EMERGENCY ROOM PHONE NUMBER: [phone]  IF A COMPLICATION OR EMERGENCY SITUATION ARISES AND YOU ARE UNABLE TO REACH   YOUR PHYSICIAN - GO TO THE EMERGENCY ROOM.  Greg Salinas MD  5/5/2017 11:29:28 AM  This report has been verified and signed electronically.         Admission Information     Date & Time Provider Department CSN    5/5/2017  7:33 AM Greg Salinas MD [facility] [id number]      Care Providers     Provider Role Specialty Primary office phone    Greg Salinas  "MD Attending Provider Gastroenterology [phone]    Greg Salinas MD Surgeon  Gastroenterology [phone]      Your Vitals Were     BP Pulse Temp Resp Height Weight    139/62 83 98.1 °F (36.7 °C) (Skin) 18 5' 1" (1.549 m) 59 kg (130 lb)    SpO2 BMI             100% 24.56 kg/m2         Recent Lab Values        8/21/2013 12/31/2013 4/3/2014 9/24/2014 4/8/2015 12/30/2015 8/25/2016 3/24/2017     12:36 PM  3:15 PM  4:30 PM  2:20 PM  4:40 PM 11:33 AM 11:20 AM 12:16 PM    A1C 7.8 (H) 7.8 (H) 7.8 (H) 6.7 (H) 6.4 (H) 6.7 (H) 7.0 (H) 7.1 (H)    Comment for A1C at 11:20 AM on 8/25/2016:  According to ADA guidelines, hemoglobin A1C <7.0% represents  optimal control in non-pregnant diabetic patients.  Different  metrics may apply to specific populations.   Standards of Medical Care in Diabetes - 2016.  For the purpose of screening for the presence of diabetes:  <5.7%     Consistent with the absence of diabetes  5.7-6.4%  Consistent with increasing risk for diabetes   (prediabetes)  >or=6.5%  Consistent with diabetes  Currently no consensus exists for use of hemoglobin A1C  for diagnosis of diabetes for children.      Comment for A1C at 12:16 PM on 3/24/2017:  According to ADA guidelines, hemoglobin A1C <7.0% represents  optimal control in non-pregnant diabetic patients.  Different  metrics may apply to specific populations.   Standards of Medical Care in Diabetes - 2016.  For the purpose of screening for the presence of diabetes:  <5.7%     Consistent with the absence of diabetes  5.7-6.4%  Consistent with increasing risk for diabetes   (prediabetes)  >or=6.5%  Consistent with diabetes  Currently no consensus exists for use of hemoglobin A1C  for diagnosis of diabetes for children.        Pending Labs     Order Current Status    Specimen to Pathology - Surgery Collected (05/05/17 1112)      Allergies as of 5/5/2017        Reactions    Erythromycin Shortness Of Breath    All mycins per patient  -affects myasthenia gravis "    Sulfa (Sulfonamide Antibiotics)       Advance Directives     An advance directive is a document which, in the event you are no longer able to make decisions for yourself, tells your healthcare team what kind of treatment you do or do not want to receive, or who you would like to make those decisions for you.  If you do not currently have an advance directive, [facility] encourages you to create one.  For more information call:  [phone] ([phone]), [phone] ([phone]),  or log on to [email or web address].        Smoking Cessation     If you would like to quit smoking:   You may be eligible for free services if you are a [place] resident and started smoking cigarettes before September 1, 1988.  Call the Smoking Cessation Trust (SCT) toll free at (299) 149-5185 or (240) 888-3697.   Call 4-761-QUIT-NOW if you do not meet the above criteria.   Contact us via email: [email or web address]   View our website for more information: [email or web address]        Language Assistance Services     ATTENTION: Language assistance services are available, free of charge. Please call [phone].      ATENCIÓN: Si habla español, tiene a yoo disposición servicios gratuitos de asistencia lingüística. Llame al [phone].     CHÚ Ý: N?u b?n nói Ti?ng Vi?t, có các d?ch v? h? tr? ngôn ng? mi?n phí dành cho b?n. G?i s? [phone].        Diabetes Discharge Instructions                                    [facility] complies with applicable Federal civil rights laws and does not discriminate on the basis of race, color, national origin, age, disability, or sex.

## 2017-05-05 NOTE — DISCHARGE INSTRUCTIONS

## 2017-05-05 NOTE — ANESTHESIA PREPROCEDURE EVALUATION
05/05/2017  Teresita Tolbert is a 76 y.o., female.    Anesthesia Evaluation    I have reviewed the Patient Summary Reports.    I have reviewed the Nursing Notes.   I have reviewed the Medications.     Review of Systems  Anesthesia Hx:  No problems with previous Anesthesia Denies Hx of Anesthetic complications  History of prior surgery of interest to airway management or planning:  Denies Personal Hx of Anesthesia complications.   Social:  Former Smoker, No Alcohol Use    Hematology/Oncology:         -- Anemia:   Cardiovascular:   Hypertension  Denies Angina. ACOSTA ECG has been reviewed. ACOSTA is not new and patient feels is related to myasthenia   Pulmonary:   Denies Shortness of breath.    Hepatic/GI:   Bowel Prep.    Musculoskeletal:   Arthritis  Myasthenia gravis s/p thymectomy.  rheumatoid arthritis   Neurological:   Neuromuscular Disease,    Endocrine:   Diabetes        Physical Exam  General:  Well nourished    Airway/Jaw/Neck:  Airway Findings: Mouth Opening: Normal Tongue: Large  General Airway Assessment: Adult  Mallampati: I  TM Distance: Normal, at least 6 cm  Jaw/Neck Findings:  Neck ROM: Normal Extension, Painful     Eyes/Ears/Nose:  Eyes/Ears/Nose Findings:    Dental:  DENTAL FINDINGS: Normal   Chest/Lungs:  Chest/Lungs Findings: Clear to auscultation, Normal Respiratory Rate     Heart/Vascular:  Heart Findings: Rate: Normal  Rhythm: Regular Rhythm  Sounds: Normal        Mental Status:  Mental Status Findings:  Cooperative, Alert and Oriented         Anesthesia Plan  Type of Anesthesia, risks & benefits discussed:  Anesthesia Type:  general, MAC  Patient's Preference:   Intra-op Monitoring Plan: standard ASA monitors  Intra-op Monitoring Plan Comments:   Post Op Pain Control Plan:   Post Op Pain Control Plan Comments:   Induction:   IV  Beta Blocker:  Patient is on a Beta-Blocker and has received one  dose within the past 24 hours (No further documentation required).       Informed Consent: Patient understands risks and agrees with Anesthesia plan.  Questions answered. Anesthesia consent signed with patient.  ASA Score: 3     Day of Surgery Review of History & Physical:    H&P update referred to the surgeon.         Ready For Surgery From Anesthesia Perspective.

## 2017-05-05 NOTE — PLAN OF CARE
Problem: Patient Care Overview  Goal: Plan of Care Review  Outcome: Outcome(s) achieved Date Met:  05/05/17     Discharge instructions  given to patient and family. Verbalized understanding. Patient stable, tolerating fluids. No complaints at this time. Patient adequate for discharge.

## 2017-05-05 NOTE — ANESTHESIA POSTPROCEDURE EVALUATION
"Anesthesia Post Evaluation    Patient: Teresita Tolbert    Procedure(s) Performed: Procedure(s) (LRB):  ESOPHAGOGASTRODUODENOSCOPY (EGD) (N/A)  COLONOSCOPY (N/A)    Final Anesthesia Type: general  Patient location during evaluation: PACU  Patient participation: Yes- Able to Participate  Level of consciousness: awake and alert and oriented  Post-procedure vital signs: reviewed and stable  Pain management: adequate  Airway patency: patent  PONV status at discharge: No PONV  Anesthetic complications: no      Cardiovascular status: blood pressure returned to baseline and hemodynamically stable  Respiratory status: unassisted, spontaneous ventilation and room air  Hydration status: euvolemic  Follow-up not needed.        Visit Vitals    /62    Pulse 83    Temp 36.7 °C (98.1 °F) (Skin)    Resp 18    Ht 5' 1" (1.549 m)    Wt 59 kg (130 lb)    SpO2 100%    Breastfeeding No    BMI 24.56 kg/m2       Pain/Kassie Score: Pain Assessment Performed: Yes (5/5/2017 11:19 AM)  Presence of Pain: denies (5/5/2017 11:19 AM)  Kassie Score: 9 (5/5/2017 11:19 AM)      "

## 2017-05-05 NOTE — PATIENT INSTRUCTIONS
Discharge Summary/Instructions for after Colonoscopy with   Biopsy/Polypectomy  Patient Name: Teresita Tolbert  Patient MRN: [id number]  Patient YOB: 1940  Friday, May 05, 2017    Greg Salinas MD  RESTRICTIONS ON ACTIVITY:  - Do not drive a car or operate machinery until the day after the procedure.      - The following day: return to full activity including work.  - For  3 days: No heavy lifting, straining or running.  - Diet: Eat and drink normally unless instructed otherwise.  TREATMENT FOR COMMON SIDE EFFECTS:  - Mild abdominal pain and bloating or excessive gas: rest, eat lightly and   use a heating pad.  SYMPTOMS TO WATCH FOR AND REPORT TO YOUR PHYSICIAN:  1. Severe abdominal pain.  2. Fever within 24 hours after a procedure.  3. A large amount of rectal bleeding. (A small amount of blood from the   rectum is not serious, especially if hemorrhoids are present.  4. Because air was put into your colon during the procedure, expelling large   amounts of air from your rectum is normal.  5. You may not have a bowel movement for 1-3 days because of the colonoscopy   prep.  This is normal.  6. Go directly to the emergency room if you notice any of the following:   Chills and/or fever over 101   Persistent vomiting   Severe abdominal pain, other than gas cramps   Severe chest pain   Black, tarry stools   Any bleeding - exceeding one tablespoon  Your doctor recommends these additional instructions:  If any biopsies were performed, my office will call you in 5 to 6 business   days with any results.  - Discharge patient to home.   - Patient has a contact number available for emergencies.  The signs and   symptoms of potential delayed complications were discussed with the   patient.  Return to normal activities tomorrow.  Written discharge   instructions were provided to the patient.   - Resume previous diet.   - Continue present medications.   - Await pathology results.   - Telephone GI clinic for pathology  results in 2 weeks.   - Repeat colonoscopy at appointment to be scheduled for retreatment.  I will   arrange for this to be done with Dr. Hale, advanced endoscopist.  You are being discharged to home.   You have a contact number available for emergencies.  The signs and symptoms   of potential delayed complications were discussed with you.  You may return   to normal activities tomorrow.  Written discharge instructions were   provided to you.   Resume your previous diet.   Continue your present medications.   We are waiting for your pathology results.   Telephone your GI clinic for pathology results in two weeks.   Your physician has recommended a repeat colonoscopy at appointment to be   scheduled for retreatment.  Discharge patient to home.   Patient has a contact number available for emergencies.  The signs and   symptoms of potential delayed complications were discussed with the   patient.  Return to normal activities tomorrow.  Written discharge   instructions were provided to the patient.   Resume previous diet.   Continue present medications.   Await pathology results.   Telephone GI clinic for pathology results in 2 weeks.   Repeat colonoscopy at appointment to be scheduled for retreatment.  If you have any questions or problems, please call your physician - Greg Salinas MD at Work:  [phone].    LAB RESULTS: [phone]  [facility] [place], EMERGENCY ROOM PHONE NUMBER: [phone]  IF A COMPLICATION OR EMERGENCY SITUATION ARISES AND YOU ARE UNABLE TO REACH   YOUR PHYSICIAN - GO TO THE EMERGENCY ROOM.  Greg Salinas MD  5/5/2017 11:29:28 AM  This report has been verified and signed electronically.

## 2017-05-05 NOTE — ANESTHESIA RELEASE NOTE
"Anesthesia Release from PACU Note    Patient: Teresita Tolbert    Procedure(s) Performed: Procedure(s) (LRB):  ESOPHAGOGASTRODUODENOSCOPY (EGD) (N/A)  COLONOSCOPY (N/A)      Last Vitals:   Visit Vitals    /62    Pulse 83    Temp 36.7 °C (98.1 °F) (Skin)    Resp 18    Ht 5' 1" (1.549 m)    Wt 59 kg (130 lb)    SpO2 100%    Breastfeeding No    BMI 24.56 kg/m2       Anesthesia type: general    Post pain: Adequate analgesia    Post assessment: no apparent anesthetic complications, tolerated procedure well and no evidence of recall    Post vital signs: stable    Level of consciousness: awake, alert  and oriented    Nausea/Vomiting: no nausea/no vomiting    Complications: none    Airway Patency: patent    Respiratory: unassisted, spontaneous ventilation, room air    Cardiovascular: stable and blood pressure at baseline    Hydration: euvolemic     "

## 2017-05-05 NOTE — PATIENT INSTRUCTIONS
Discharge Summary/Instructions for after EGD without Biopsy  Patient Name: Teresita Tolbert  Patient MRN: [id number]  Patient YOB: 1940  Friday, May 05, 2017    Greg Salinas MD  1.  Do Not eat or drink anything for 1 hour.  Try sips of water first.  If   tolerated, resume your regular diet or one recommended by your physician.  2.  Do not drive, operate machinery, make critical decisions, or do   activities that require coordination or balance for 24 hours.  3.  You may experience a sore throat for 24 to 48 hours.  You may use throat   lozenges or gargle with warm salt water to relieve the discomfort.  4.  Because air was put into your stomach during the procedure, you may   experience some belching.  5.  Go directly to the emergency room if you notice any of the following:   Chills and/or fever over 101   Persistent vomiting or vomiting with blood   Severe abdominal pain, other than gas cramps   Severe chest pain   Black, tarry stools  Your doctor recommends these additional instructions:  If any biopsies were performed, my office will call you in 5 to 6 business   days with any results.  - Perform a colonoscopy today.   - See colonoscopy report for further details.  Your physician has recommended a colonoscopy today.  Perform a colonoscopy today.  If you have any questions or problems, please call your physician.  EMERGENCY PHONE NUMBER: [phone]  LAB RESULTS: [phone]  Greg Salinas MD  5/5/2017 11:16:30 AM  This report has been verified and signed electronically.

## 2017-05-05 NOTE — TRANSFER OF CARE
"Anesthesia Transfer of Care Note    Patient: Teresita Tolbert    Procedure(s) Performed: Procedure(s) (LRB):  ESOPHAGOGASTRODUODENOSCOPY (EGD) (N/A)  COLONOSCOPY (N/A)    Patient location: [facility]    Anesthesia Type: general    Transport from OR: Transported from OR on room air with adequate spontaneous ventilation    Post pain: adequate analgesia    Post assessment: no apparent anesthetic complications and tolerated procedure well    Post vital signs: stable    Level of consciousness: sedated    Nausea/Vomiting: no nausea/vomiting    Complications: none    Transfer of care protocol was followed      Last vitals:   Visit Vitals    BP (!) 160/76 (BP Location: Left arm, Patient Position: Lying, BP Method: Automatic)    Pulse 91    Temp 37 °C (98.6 °F) (Oral)    Resp 18    Ht 5' 1" (1.549 m)    Wt 59 kg (130 lb)    SpO2 100%    Breastfeeding No    BMI 24.56 kg/m2     "

## 2017-05-05 NOTE — INTERVAL H&P NOTE
Pre-Procedure H and P Addendum    Patient seen and examined.  History and exam unchanged from prior history and physical.      Procedure: EGD and colonoscopy   Indication: Iron deficiency anemia  ASA Class: per anesthesiology  Airway: normal  Neck Mobility: full range of motion  Mallampatti score: per anesthesia  History of anesthesia problems: no  Family history of anesthesia problems: no  Anesthesia Plan: MAC    Anesthesia/Surgery risks, benefits and alternative options discussed and understood by patient/family.          There are no hospital problems to display for this patient.

## 2017-05-08 ENCOUNTER — TELEPHONE (OUTPATIENT)
Dept: GASTROENTEROLOGY | Facility: CLINIC | Age: 77
End: 2017-05-08

## 2017-05-09 ENCOUNTER — TELEPHONE (OUTPATIENT)
Dept: ENDOSCOPY | Facility: HOSPITAL | Age: 77
End: 2017-05-09

## 2017-05-09 DIAGNOSIS — Z12.11 SPECIAL SCREENING FOR MALIGNANT NEOPLASMS, COLON: Primary | ICD-10-CM

## 2017-05-09 RX ORDER — POLYETHYLENE GLYCOL 3350, SODIUM SULFATE ANHYDROUS, SODIUM BICARBONATE, SODIUM CHLORIDE, POTASSIUM CHLORIDE 236; 22.74; 6.74; 5.86; 2.97 G/4L; G/4L; G/4L; G/4L; G/4L
4 POWDER, FOR SOLUTION ORAL ONCE
Qty: 4000 ML | Refills: 0 | Status: SHIPPED | OUTPATIENT
Start: 2017-05-09 | End: 2017-05-09

## 2017-05-09 NOTE — TELEPHONE ENCOUNTER
Spoke with patient's son, Pablo, about Hx, allergies, meds and instructions for Colonoscopy/LEMR scheduled 5/23/17 at 0900.  Voices understanding.  Instructions emailed and mailed.

## 2017-05-12 DIAGNOSIS — M05.9 SEROPOSITIVE RHEUMATOID ARTHRITIS: ICD-10-CM

## 2017-05-12 RX ORDER — FOLIC ACID 1 MG/1
TABLET ORAL
Qty: 90 TABLET | Refills: 0 | Status: SHIPPED | OUTPATIENT
Start: 2017-05-12 | End: 2017-08-11 | Stop reason: SDUPTHER

## 2017-05-15 ENCOUNTER — OFFICE VISIT (OUTPATIENT)
Dept: GASTROENTEROLOGY | Facility: CLINIC | Age: 77
End: 2017-05-15
Payer: MEDICARE

## 2017-05-15 VITALS
BODY MASS INDEX: 24.52 KG/M2 | SYSTOLIC BLOOD PRESSURE: 155 MMHG | WEIGHT: 129.88 LBS | HEIGHT: 61 IN | HEART RATE: 70 BPM | DIASTOLIC BLOOD PRESSURE: 79 MMHG

## 2017-05-15 DIAGNOSIS — K21.9 GASTROESOPHAGEAL REFLUX DISEASE WITHOUT ESOPHAGITIS: ICD-10-CM

## 2017-05-15 DIAGNOSIS — D50.0 IRON DEFICIENCY ANEMIA DUE TO CHRONIC BLOOD LOSS: Primary | ICD-10-CM

## 2017-05-15 PROCEDURE — 99213 OFFICE O/P EST LOW 20 MIN: CPT | Mod: S$PBB,,, | Performed by: NURSE PRACTITIONER

## 2017-05-15 PROCEDURE — 99999 PR PBB SHADOW E&M-EST. PATIENT-LVL III: CPT | Mod: PBBFAC,,, | Performed by: NURSE PRACTITIONER

## 2017-05-15 PROCEDURE — 99213 OFFICE O/P EST LOW 20 MIN: CPT | Mod: PBBFAC | Performed by: NURSE PRACTITIONER

## 2017-05-15 NOTE — MR AVS SNAPSHOT
[facility]  [address]  [place] [address]  Phone: [phone]  Fax: [phone]                  Teresita Tolbert   5/15/2017 11:00 AM   Office Visit    Description:  Female : 1940   Provider:  Shaunna Reyes NP   Department:  [facility] - Gastroenterology           Reason for Visit     Follow-up           Diagnoses this Visit        Comments    Iron deficiency anemia due to chronic blood loss    -  Primary            To Do List           Future Appointments        Provider Department Dept Phone    5/15/2017 11:40 AM LAB, APPOINTMENT [place] [facility] [phone]    2017 10:10 AM JAMES Olsen MD [facility] - Ophthalmology [phone]    2017 8:15 AM LAB, SAME DAY [facility] [phone]    2017 9:00 AM Nico Squires MD [facility] - Rheumatology [phone]    2017 11:15 AM Meet Barrera MD [facility] - Internal Medicine [phone]      Your Future Surgeries/Procedures     May 23, 2017   Surgery with Addy Hale MD   [facility] ([facility])    [address] [address] [phone]              Goals (5 Years of Data)     None      [facility]     [facility] Nurse Care Line -  Assistance  Unless otherwise directed by your provider, please contact [facility], our nurse care line that is available for  assistance.     Registered nurses in the [facility] provide: appointment scheduling, clinical advisement, health education, and other advisory services.  Call: [phone] (toll free)               Medications           Message regarding Medications     Verify the changes and/or additions to your medication regime listed below are the same as discussed with your clinician today.  If any of these changes or additions are incorrect, please notify your healthcare provider.            "  Verify that the below list of medications is an accurate representation of the medications you are currently taking.  If none reported, the list may be blank. If incorrect, please contact your healthcare provider. Carry this list with you in case of emergency.           Current Medications     amlodipine (NORVASC) 5 MG tablet Take 1 tablet (5 mg total) by mouth once daily.    aspirin (ECOTRIN) 81 MG EC tablet Take 81 mg by mouth once daily.      atenolol (TENORMIN) 50 MG tablet Take 2 tablets (100 mg total) by mouth once daily.    bismuth subsalicylate (PEPTO BISMOL) 262 mg/15 mL suspension Take 15 mLs by mouth every 6 (six) hours as needed.    BLOOD SUGAR DIAGNOSTIC (ONE TOUCH ULTRA TEST [facility]) * * * As directed.  test sugars 2 times daily    blood sugar diagnostic (ONETOUCH ULTRA TEST) Strp USE TO TEST BLOOD SUGAR2 TIMES DAILY    calcium-vitamin D3 500 mg(1,250mg) -200 unit per tablet Take 1 tablet by mouth once daily.    ferrous sulfate 325 mg (65 mg iron) Tab tablet Take 1 tablet (325 mg total) by mouth daily with breakfast.    folic acid (FOLVITE) 1 MG tablet TAKE 1 TABLET BY MOUTH EVERY DAY    gentamicin (GARAMYCIN) 0.3 % (3 mg/gram) ophthalmic ointment Place 0.5 inches into both eyes every evening.    methotrexate 2.5 MG Tab TAKE 4 TABLETS BY MOUTH EVERY SEVEN DAYS    multivitamin capsule Take 1 capsule by mouth once daily.      ONETOUCH ULTRA TEST Strp USE TO TEST THREE TIMES DAILY.    potassium chloride (KLOR-CON) 10 MEQ TbSR TAKE 1 TABLET BY MOUTH DAILY    predniSONE (DELTASONE) 5 MG tablet Take 2 tablets (10 mg total) by mouth every other day.    sitagliptan-metformin (JANUMET)  mg per tablet Take 1 tablet by mouth 2 (two) times daily.           Clinical Reference Information           Your Vitals Were     BP Pulse Height Weight BMI    155/79 70 5' 1" (1.549 m) 58.9 kg (129 lb 13.6 oz) 24.54 kg/m2      Blood Pressure          Most Recent Value    BP  (!)  155/79 [pt did take bp med. today]    "   Allergies as of 5/15/2017     Erythromycin    Sulfa (Sulfonamide Antibiotics)      Immunizations Administered on Date of Encounter - 5/15/2017     None      Orders Placed During Today's Visit     Future Labs/Procedures Expected by Expires    CBC auto differential  5/15/2017 7/14/2018    IRON AND TIBC  5/15/2017 7/14/2018      Instructions    For GERD/Reflux:    Remain upright for at least 3 hours after eating.    Elevate the head of the bead about 6 inches.  Some patients place cinder blocks under the head of the bed for elevation.    Avoid foods that you have noticed make your symptoms worse (possible triggers include:peppermint, chocolate, caffeine, spicy foods, greasy/fried foods, acidic foods).    Set a weight loss goal of 10% of your body weight. (For example, if you weigh 150 lbs, your goal should be to loose 15 lbs).    You may take over the counter Zantac/ranitadine as directed, as needed for breakthrough symptoms.     Make a clinic appointment If symptoms occur more than twice per week, despite taking medications appropriately.          Language Assistance Services     ATTENTION: Language assistance services are available, free of charge. Please call [phone].      ATENCIÓN: Si habla giovanna, tiene a yoo disposición servicios gratuitos de asistencia lingüística. Llame al [phone].     BETZAIDA Ý: N?u b?n nói Ti?ng Vi?t, có các d?ch v? h? tr? ngôn ng? mi?n phí dành cho b?n. G?i s? [phone].         [facility] complies with applicable Federal civil rights laws and does not discriminate on the basis of race, color, national origin, age, disability, or sex.

## 2017-05-15 NOTE — PATIENT INSTRUCTIONS
For GERD/Reflux:    Remain upright for at least 3 hours after eating.    Elevate the head of the bead about 6 inches.  Some patients place cinder blocks under the head of the bed for elevation.    Avoid foods that you have noticed make your symptoms worse (possible triggers include:peppermint, chocolate, caffeine, spicy foods, greasy/fried foods, acidic foods).    Set a weight loss goal of 10% of your body weight. (For example, if you weigh 150 lbs, your goal should be to loose 15 lbs).    You may take over the counter Zantac/ranitadine as directed, as needed for breakthrough symptoms.     Make a clinic appointment If symptoms occur more than twice per week, despite taking medications appropriately.

## 2017-05-15 NOTE — PROGRESS NOTES
[facility] Note    Reason for Visit:  The primary encounter diagnosis was Iron deficiency anemia due to chronic blood loss. A diagnosis of Gastroesophageal reflux disease without esophagitis was also pertinent to this visit.    PCP:   Meet Barrera       Referring MD:  No referring provider defined for this encounter.    HPI:  This is a 76 y.o. female here for follow up evaluation of anemia. Ms. Tolbert is new to the clinic and here with her son. Pmhx of htn, myasthenia gravis, and diabetes. Worked up by PCP for anemia. Occult stool study positive 3/28/17. Last hgb 10.3 on 3/24/17. Has been taking iron supplement daily. Last clinic visit 4/7/17. EGD done 5/5/17 no active bleeding. Colonoscopy done 5/5/17 Cecal polyps x 2 removed and path- tubular adenoma. 30 mm polyp in mid ascending colon not removed- likely source of her iron deficiency. Scheduled colonoscopy with Dr. Hale 5/23/17 for removal of 30 mm polyp.     Currently, having a normal formed stool daily. Stool has been darker in color since taking iron supplement. Denies BRBPR, black tarry stools, and hematemesis. Denies CP, PICA, and SOB at rest.     Hx of Gerd and indigestion occurs 2x/month. Takes Pepto Bismol as needed and relief. Still eating food triggers of spicy food and red sauces. Denies regurgitation, dysphagia, odynophagia, and abdominal pain. NSAID usage- Aspirin 81 mg daily and takes with food.     ROS:  Constitutional: No fevers, no chills, No unintentional weight loss, + fatigue   ENT: No allergies  CV: No chest pain, no palpitations, no perif. edema  Pulm: No cough, + shortness of breath upon exertion, no SOB at rest, no wheezes, no sputum  Ophtho: No vision changes  GI: see HPI; also no nausea, no vomiting, no change in appetite  Derm: No rash  Heme: No lymphadenopathy, No bruising  MSK: No arthritis, no muscle pain, no muscle weakness  : No dysuria, No hematuria  Endo: No hot or cold intolerance  Neuro: No  syncope, No seizure     Medical History:  has a past medical history of Anemia; Arthritis of hand (2013); Cataract; Diabetes mellitus; Diabetic retinopathy; GERD (gastroesophageal reflux disease); HTN (hypertension) (2013); Hypertension; Myasthenia gravis; Osteoporosis; and Type II or unspecified type diabetes mellitus without mention of complication, uncontrolled (2013).    Surgical History:  has a past surgical history that includes Cataract extraction; s/p focal laser (12); s/p avastin ou (12); s/p avastin  (12);  section (); Hysterectomy; and Colonoscopy (N/A, 2017).    Family History: family history includes Diabetes in her mother. There is no history of Celiac disease, Colon polyps, Colon cancer, Esophageal cancer, Inflammatory bowel disease, Irritable bowel syndrome, or Stomach cancer..     Social History:  reports that she quit smoking about 41 years ago. She has never used smokeless tobacco. She reports that she does not drink alcohol or use illicit drugs.    Review of patient's allergies indicates:   Allergen Reactions    Erythromycin Shortness Of Breath     All mycins per patient  -affects myasthenia gravis    Sulfa (sulfonamide antibiotics)      Current Outpatient Prescriptions   Medication Sig    amlodipine (NORVASC) 5 MG tablet Take 1 tablet (5 mg total) by mouth once daily.    aspirin (ECOTRIN) 81 MG EC tablet Take 81 mg by mouth once daily.      atenolol (TENORMIN) 50 MG tablet Take 2 tablets (100 mg total) by mouth once daily.    bismuth subsalicylate (PEPTO BISMOL) 262 mg/15 mL suspension Take 15 mLs by mouth every 6 (six) hours as needed.    BLOOD SUGAR DIAGNOSTIC (ONE TOUCH ULTRA TEST [facility] * * * As directed.  test sugars 2 times daily    blood sugar diagnostic (ONETOUCH ULTRA TEST) Strp USE TO TEST BLOOD SUGAR2 TIMES DAILY    calcium-vitamin D3 500 mg(1,250mg) -200 unit per tablet Take 1 tablet by mouth once daily.    ferrous sulfate 325 mg  "(65 mg iron) Tab tablet Take 1 tablet (325 mg total) by mouth daily with breakfast.    folic acid (FOLVITE) 1 MG tablet TAKE 1 TABLET BY MOUTH EVERY DAY    gentamicin (GARAMYCIN) 0.3 % (3 mg/gram) ophthalmic ointment Place 0.5 inches into both eyes every evening.    methotrexate 2.5 MG Tab TAKE 4 TABLETS BY MOUTH EVERY SEVEN DAYS    multivitamin capsule Take 1 capsule by mouth once daily.      ONETOUCH ULTRA TEST Strp USE TO TEST THREE TIMES DAILY.    potassium chloride (KLOR-CON) 10 MEQ TbSR TAKE 1 TABLET BY MOUTH DAILY    predniSONE (DELTASONE) 5 MG tablet Take 2 tablets (10 mg total) by mouth every other day.    sitagliptan-metformin (JANUMET)  mg per tablet Take 1 tablet by mouth 2 (two) times daily.     Current Facility-Administered Medications   Medication    denosumab (PROLIA) injection 60 mg     Objective Findings:    Vital Signs:  BP (!) 155/79 Comment: pt did take bp med. today  Pulse 70  Ht 5' 1" (1.549 m)  Wt 58.9 kg (129 lb 13.6 oz)  BMI 24.54 kg/m2  Body mass index is 24.54 kg/(m^2).    Physical Exam:  General Appearance: Well appearing in no acute distress and using cane for walking long distance  Head: Normocephalic, without obvious abnormality  Eyes: No scleral icterus, EOMI  ENT: Neck supple, Lips, mucosa, and tongue normal; teeth and gums normal  Lungs: CTA bilaterally in anterior and posterior fields, no wheezes, no crackles.  Heart: Regular rate and rhythm, no murmurs heard  Abdomen: Soft, non tender, non distended with positive bowel sounds in all four quadrants.  Extremities: No clubbing, cyanosis or edema  Skin: No rash to exposed areas  Neurologic: AAOx4    Labs:  Lab Results   Component Value Date    WBC 8.30 03/24/2017    HGB 10.3 (L) 03/24/2017    HCT 33.9 (L) 03/24/2017     03/24/2017    CHOL 143 03/24/2017    TRIG 137 03/24/2017    HDL 42 03/24/2017    ALT 20 02/03/2017    AST 19 02/03/2017     02/03/2017    K 3.7 02/03/2017     02/03/2017    " CREATININE 0.9 02/03/2017    BUN 14 02/03/2017    CO2 26 02/03/2017    TSH 1.717 03/24/2017    GLUF 125 (H) 03/28/2006    HGBA1C 7.1 (H) 03/24/2017     Endoscopy:    EGD- 5/5/17 Dr. Salinas- Low-grade of narrowing Schatzki ring, hiatal hernia, otherwise normal.     Colonoscopy- 5/5/17 Dr. Salinas- Cecal polyps x 2 removed and path- tubular adenoma. 30 mm polyp in mid ascending colon not removed- likely source of her iron deficiency. Diverticulosis in sigmoid colon.     Assessment:  1. Iron deficiency anemia due to chronic blood loss    2. Gastroesophageal reflux disease without esophagitis      Recommendations:  1. Reviewed colonoscopy and EGD and questions answered. Ordered labs- CBC and Iron studies. Colonoscopy scheduled 5/23/17 with Dr. Hale for removal of 30 mm polyp in ascending colon likely source of iron deficiency. Continue taking Iron daily.   2. Continue to take Pepto Bismol as needed. Reviewed Gerd and handout provided. If Gerd symptoms increase to greater than twice per week notify clinic and will begin a daily PPI and pt understood.     Follow up as needed.     Order summary:  Orders Placed This Encounter    CBC auto differential    IRON AND TIBC     Thank you so much for allowing me to participate in the care of BERNARDINO Alvarez, LISANDRAP-C

## 2017-05-16 ENCOUNTER — PROCEDURE VISIT (OUTPATIENT)
Dept: OPHTHALMOLOGY | Facility: CLINIC | Age: 77
End: 2017-05-16
Payer: MEDICARE

## 2017-05-16 VITALS — HEART RATE: 66 BPM | SYSTOLIC BLOOD PRESSURE: 151 MMHG | DIASTOLIC BLOOD PRESSURE: 73 MMHG

## 2017-05-16 DIAGNOSIS — E11.3513 DIABETIC MACULAR EDEMA OF BOTH EYES WITH PROLIFERATIVE RETINOPATHY ASSOCIATED WITH TYPE 2 DIABETES MELLITUS: Primary | ICD-10-CM

## 2017-05-16 DIAGNOSIS — H43.813 POSTERIOR VITREOUS DETACHMENT OF BOTH EYES: ICD-10-CM

## 2017-05-16 PROCEDURE — 92134 CPTRZ OPH DX IMG PST SGM RTA: CPT | Mod: PBBFAC | Performed by: OPHTHALMOLOGY

## 2017-05-16 PROCEDURE — 67028 INJECTION EYE DRUG: CPT | Mod: PBBFAC,LT | Performed by: OPHTHALMOLOGY

## 2017-05-16 PROCEDURE — 92235 FLUORESCEIN ANGRPH MLTIFRAME: CPT | Mod: 50,PBBFAC | Performed by: OPHTHALMOLOGY

## 2017-05-16 PROCEDURE — C9257 BEVACIZUMAB INJECTION: HCPCS | Mod: PBBFAC | Performed by: OPHTHALMOLOGY

## 2017-05-16 PROCEDURE — 67028 INJECTION EYE DRUG: CPT | Mod: S$PBB,LT,, | Performed by: OPHTHALMOLOGY

## 2017-05-16 PROCEDURE — 92014 COMPRE OPH EXAM EST PT 1/>: CPT | Mod: 25,S$PBB,, | Performed by: OPHTHALMOLOGY

## 2017-05-16 RX ADMIN — BEVACIZUMAB 1.25 MG: 100 INJECTION, SOLUTION INTRAVENOUS at 12:05

## 2017-05-16 NOTE — PATIENT INSTRUCTIONS
Fluorescein Angiography     A fluorescein angiogram of the retina   Fluorescein angiography is an eye test. It is done to look at the back of your eye, including:  · The blood vessels in your eye  · The layer of tissue at the back of your eye (the retina)  · The center of your retina (the macula)  · The optic nerve  This test can diagnose diseases found in these areas. It can also diagnose other conditions that affect these areas. To do this test, a dye called fluorescein is shot (injected) into your arm. The dye goes into your bloodstream and up into the blood vessels in your eyes. A special camera is then used to take images (angiograms) of your eyes.  Getting ready for your test  Tell your healthcare provider if you:  · Are pregnant or think you may be pregnant  · Are breastfeeding  · Have a history of severe allergic reactions, including to X-ray dye or other medicines  · Have kidney problems  Tell your provider about any medicines you are taking. You may need to stop taking all or some of these before the test. This includes:  · All prescription medicines  · Over-the-counter medicines such as aspirin or ibuprofen  · Street drugs  · Herbs, vitamins, and other supplements  You should arrange for an adult family member or friend to drive you home after your test. Your vision will be blurry for up to 12 hours.  Follow any other instructions from your healthcare provider.  During your test  · You are given eye drops to enlarge (dilate) your pupils.  · You then sit in front of a special camera. You place your chin on the chin rest and look into the camera.  · Images are taken of your eyes, one eye at a time.  · Fluorescein dye is then injected into your arm. The lights in the room are turned off. You may have mild nausea. You may have a warm feeling in your arm or upper body. Tell your provider if your skin feels itchy or if you are having trouble breathing. If so, you could be having an allergic reaction to the  dye.  · More pictures of your eyes are taken over 15 to 30 minutes. The camera shines a bright light into your eyes. Try to keep your head still and your eyes open.  · When enough images have been taken, the test is over.  After your test  Your vision will be blurry for up to 4 to 12 hours. This is because of your dilated pupils. Your eye will be more sensitive to light for up to 12 hours. You may want to wear sunglasses during this time. Do not drive if your vision is very blurry. You may also find it uncomfortable to read. Your skin may look yellow for a few hours. This is from the dye. Your urine will be bright yellow or orange for 24 to 48 hours after the test.     Risks and possible complications  All procedures have some risks. Possible risks of fluorescein angiography include:  · Upset stomach (nausea) and vomiting  · Leaking dye around the injection site that causes pain and swelling  · Metallic taste in your mouth  · Infection at injection site  · Allergic reaction to the dye  · Dry mouth or too much saliva  · Faster heart rate  · Sweating  · Lower back pain   Date Last Reviewed: 5/30/2015  © 0652-1268 [employer]. [address] [place] [address]. All rights reserved. This information is not intended as a substitute for professional medical care. Always follow your healthcare professional's instructions.      .POST INTRAVITREAL INJECTION PATIENT INSTRUCTIONS   Below are some guidelines for what to expect after your treatment and additional care instructions.   * you may experience mild discomfort in your eye after the treatment. Your eye usually feels better within 24 to 48 hours after the procedure.   * you have been given drops that numb the surface of your eye.   DO NOT rub or touch your eye, DO NOT wear contacts until numbness goes away.   * you may experience small spots that appear in your field of vision, these are usually caused by an air bubble or from the medication. It  taked a few hours or days for these to go away.   * use of sunglasses will help reduce light sensitivity and glare.   * DO NOT swim or put sink water ( tap water ) or swin for at least 24 hours after the injection   * If you have a gritty, burning, irritating or stinging feeling in the injected eye. This may be a result of the antiseptic used. Use artifical tears or eye lubricant ( from over- the- counter from your local pharmacy ). If you have some at home already please check the expiration date, so not to use anything contaminated in your eye. A cool pack over your eye brow above the injected eye may also relieve discomfort.   Call us right away or go to the Emergency Department if you have a dramatic decrease in vision or extreme pain in the eye.   [facility] [phone]

## 2017-05-16 NOTE — PROGRESS NOTES
OCT - DME OU - good focal OU  DME Stable, OD DME improved OS    FA shows good perfusion time OU, multiple late leakage points in macula OU, staining of peripheral laser scars OU, mild late leakage from nerve OS      A/P    1-PDR OS>OD:   - S/p PRP OD (06/16/15)   - S/p PRP OS (07/07/15)   - Last HbA1c was 6.4 on 4/8/15   - Emphasized the importance of tight glucose control    2-DME OU   - s/p focal OU   -  Increase in cystoid fluid OS  S/p resumed avastin OS x 1    Repeat Avastin OS today    3-PCIOL OU    4-Ectropion OU:  Saw Mary for repair        1 month OCT      Risks, benefits, and alternatives to treatment discussed in detail with the patient.  The patient voiced understanding and wished to proceed with the procedure    Injection Procedure Note:  Diagnosis: DME OS    Topical Proparacaine and Betadine.  Inject Avastin OS at 6:00 @ 3.5-4mm posterior to limbus  Post Operative Dx: Same  Complications: None  Follow up as above.

## 2017-05-23 ENCOUNTER — HOSPITAL ENCOUNTER (OUTPATIENT)
Facility: HOSPITAL | Age: 77
Discharge: HOME | End: 2017-05-23
Attending: INTERNAL MEDICINE | Admitting: INTERNAL MEDICINE
Payer: MEDICARE

## 2017-05-23 ENCOUNTER — ANESTHESIA (OUTPATIENT)
Dept: ENDOSCOPY | Facility: HOSPITAL | Age: 77
End: 2017-05-23
Payer: MEDICARE

## 2017-05-23 ENCOUNTER — ANESTHESIA EVENT (OUTPATIENT)
Dept: ENDOSCOPY | Facility: HOSPITAL | Age: 77
End: 2017-05-23
Payer: MEDICARE

## 2017-05-23 VITALS
OXYGEN SATURATION: 100 % | HEART RATE: 83 BPM | RESPIRATION RATE: 18 BRPM | SYSTOLIC BLOOD PRESSURE: 135 MMHG | HEIGHT: 62 IN | WEIGHT: 127 LBS | DIASTOLIC BLOOD PRESSURE: 61 MMHG | TEMPERATURE: 98 F | BODY MASS INDEX: 23.37 KG/M2

## 2017-05-23 DIAGNOSIS — K63.5 POLYP OF COLON, UNSPECIFIED PART OF COLON, UNSPECIFIED TYPE: Primary | ICD-10-CM

## 2017-05-23 DIAGNOSIS — K63.5 COLON POLYP: ICD-10-CM

## 2017-05-23 LAB
POCT GLUCOSE: 166 MG/DL (ref 70–110)
POCT GLUCOSE: 190 MG/DL (ref 70–110)

## 2017-05-23 PROCEDURE — 37000008 HC ANESTHESIA 1ST 15 MINUTES: Performed by: INTERNAL MEDICINE

## 2017-05-23 PROCEDURE — 88305 TISSUE EXAM BY PATHOLOGIST: CPT | Performed by: PATHOLOGY

## 2017-05-23 PROCEDURE — 88305 TISSUE EXAM BY PATHOLOGIST: CPT | Mod: 26,,, | Performed by: PATHOLOGY

## 2017-05-23 PROCEDURE — 25000003 PHARM REV CODE 250: Performed by: NURSE ANESTHETIST, CERTIFIED REGISTERED

## 2017-05-23 PROCEDURE — 27200967 HC COAGRASPER: Performed by: INTERNAL MEDICINE

## 2017-05-23 PROCEDURE — 88309 TISSUE EXAM BY PATHOLOGIST: CPT | Mod: 26,,, | Performed by: PATHOLOGY

## 2017-05-23 PROCEDURE — 37000009 HC ANESTHESIA EA ADD 15 MINS: Performed by: INTERNAL MEDICINE

## 2017-05-23 PROCEDURE — 63600175 PHARM REV CODE 636 W HCPCS: Performed by: INTERNAL MEDICINE

## 2017-05-23 PROCEDURE — 27201028 HC NEEDLE, SCLERO: Performed by: INTERNAL MEDICINE

## 2017-05-23 PROCEDURE — 27201089 HC SNARE, DISP (ANY): Performed by: INTERNAL MEDICINE

## 2017-05-23 PROCEDURE — 63600175 PHARM REV CODE 636 W HCPCS: Performed by: NURSE ANESTHETIST, CERTIFIED REGISTERED

## 2017-05-23 PROCEDURE — C9399 UNCLASSIFIED DRUGS OR BIOLOG: HCPCS | Performed by: INTERNAL MEDICINE

## 2017-05-23 PROCEDURE — 27201042 HC RETRIEVAL NET: Performed by: INTERNAL MEDICINE

## 2017-05-23 PROCEDURE — 45381 COLONOSCOPY SUBMUCOUS NJX: CPT | Mod: ,,, | Performed by: INTERNAL MEDICINE

## 2017-05-23 PROCEDURE — 45385 COLONOSCOPY W/LESION REMOVAL: CPT | Mod: ,,, | Performed by: INTERNAL MEDICINE

## 2017-05-23 PROCEDURE — 82962 GLUCOSE BLOOD TEST: CPT | Performed by: INTERNAL MEDICINE

## 2017-05-23 PROCEDURE — D9220A PRA ANESTHESIA: Mod: CRNA,,, | Performed by: NURSE ANESTHETIST, CERTIFIED REGISTERED

## 2017-05-23 PROCEDURE — 25000003 PHARM REV CODE 250: Performed by: INTERNAL MEDICINE

## 2017-05-23 PROCEDURE — 45381 COLONOSCOPY SUBMUCOUS NJX: CPT | Performed by: INTERNAL MEDICINE

## 2017-05-23 PROCEDURE — D9220A PRA ANESTHESIA: Mod: ANES,,, | Performed by: ANESTHESIOLOGY

## 2017-05-23 PROCEDURE — 45385 COLONOSCOPY W/LESION REMOVAL: CPT | Performed by: INTERNAL MEDICINE

## 2017-05-23 PROCEDURE — 27200997: Performed by: INTERNAL MEDICINE

## 2017-05-23 PROCEDURE — 27201012 HC FORCEPS, HOT/COLD, DISP: Performed by: INTERNAL MEDICINE

## 2017-05-23 RX ORDER — PROPOFOL 10 MG/ML
VIAL (ML) INTRAVENOUS
Status: DISCONTINUED | OUTPATIENT
Start: 2017-05-23 | End: 2017-05-23

## 2017-05-23 RX ORDER — FENTANYL CITRATE 50 UG/ML
INJECTION, SOLUTION INTRAMUSCULAR; INTRAVENOUS
Status: DISCONTINUED | OUTPATIENT
Start: 2017-05-23 | End: 2017-05-23

## 2017-05-23 RX ORDER — LIDOCAINE HCL/PF 100 MG/5ML
SYRINGE (ML) INTRAVENOUS
Status: DISCONTINUED | OUTPATIENT
Start: 2017-05-23 | End: 2017-05-23

## 2017-05-23 RX ORDER — PROPOFOL 10 MG/ML
VIAL (ML) INTRAVENOUS CONTINUOUS PRN
Status: DISCONTINUED | OUTPATIENT
Start: 2017-05-23 | End: 2017-05-23

## 2017-05-23 RX ORDER — PHENYLEPHRINE HYDROCHLORIDE 10 MG/ML
INJECTION INTRAVENOUS
Status: DISCONTINUED | OUTPATIENT
Start: 2017-05-23 | End: 2017-05-23

## 2017-05-23 RX ORDER — METHYLENE BLUE 5 MG/ML
INJECTION INTRAVENOUS CODE/TRAUMA/SEDATION MEDICATION
Status: COMPLETED | OUTPATIENT
Start: 2017-05-23 | End: 2017-05-23

## 2017-05-23 RX ORDER — EPINEPHRINE 0.1 MG/ML
INJECTION INTRAVENOUS CODE/TRAUMA/SEDATION MEDICATION
Status: COMPLETED | OUTPATIENT
Start: 2017-05-23 | End: 2017-05-23

## 2017-05-23 RX ORDER — SODIUM CHLORIDE 9 MG/ML
INJECTION, SOLUTION INTRAVENOUS CONTINUOUS
Status: DISCONTINUED | OUTPATIENT
Start: 2017-05-23 | End: 2017-05-23 | Stop reason: HOSPADM

## 2017-05-23 RX ADMIN — FENTANYL CITRATE 25 MCG: 50 INJECTION, SOLUTION INTRAMUSCULAR; INTRAVENOUS at 09:05

## 2017-05-23 RX ADMIN — PROPOFOL 40 MG: 10 INJECTION, EMULSION INTRAVENOUS at 09:05

## 2017-05-23 RX ADMIN — PHENYLEPHRINE HYDROCHLORIDE 100 MCG: 10 INJECTION INTRAVENOUS at 09:05

## 2017-05-23 RX ADMIN — METHYLENE BLUE 0.1 MG: 5 INJECTION INTRAVENOUS at 10:05

## 2017-05-23 RX ADMIN — SODIUM CHLORIDE: 0.9 INJECTION, SOLUTION INTRAVENOUS at 09:05

## 2017-05-23 RX ADMIN — PROPOFOL 100 MCG/KG/MIN: 10 INJECTION, EMULSION INTRAVENOUS at 09:05

## 2017-05-23 RX ADMIN — LIDOCAINE HYDROCHLORIDE 60 MG: 20 INJECTION, SOLUTION INTRAVENOUS at 09:05

## 2017-05-23 RX ADMIN — FENTANYL CITRATE 50 MCG: 50 INJECTION, SOLUTION INTRAMUSCULAR; INTRAVENOUS at 09:05

## 2017-05-23 RX ADMIN — EPINEPHRINE 0.8 MG: 0.1 INJECTION, SOLUTION ENDOTRACHEAL; INTRACARDIAC; INTRAVENOUS at 10:05

## 2017-05-23 NOTE — H&P
History & Physical - Short Stay  Gastroenterology      SUBJECTIVE:     Procedure: Colonoscopy    Chief Complaint/Indication for Procedure: Colon polyp    History of Present Illness:  Patient is a 76 y.o. female presents with evidence of a large polyp in the ascending colon in recent colonoscopy for anemia here for possible EMR.     Colonoscopy on 5/5/2017    Impression:           - One 4 mm polyp in the cecum, removed with a cold                         snare. Resected and retrieved.                        - One 2 mm polyp in the cecum, removed with a jumbo                         cold forceps. Resected and retrieved.                        - One 6 mm polyp in the mid ascending colon.                         Resection not attempted.                        - One 30 mm polyp in the mid ascending colon.                         Resection not attempted. This is likely the source                         of her iron deficiency.    Facility-Administered Medications Prior to Admission   Medication    denosumab (PROLIA) injection 60 mg     PTA Medications   Medication Sig    amlodipine (NORVASC) 5 MG tablet Take 1 tablet (5 mg total) by mouth once daily.    aspirin (ECOTRIN) 81 MG EC tablet Take 81 mg by mouth once daily.      atenolol (TENORMIN) 50 MG tablet Take 2 tablets (100 mg total) by mouth once daily.    bismuth subsalicylate (PEPTO BISMOL) 262 mg/15 mL suspension Take 15 mLs by mouth every 6 (six) hours as needed.    BLOOD SUGAR DIAGNOSTIC (ONE TOUCH ULTRA TEST [facility]) * * * As directed.  test sugars 2 times daily    blood sugar diagnostic (ONETOUCH ULTRA TEST) Strp USE TO TEST BLOOD SUGAR2 TIMES DAILY    calcium-vitamin D3 500 mg(1,250mg) -200 unit per tablet Take 1 tablet by mouth once daily.    ferrous sulfate 325 mg (65 mg iron) Tab tablet Take 1 tablet (325 mg total) by mouth daily with breakfast.    folic acid (FOLVITE) 1 MG tablet TAKE 1 TABLET BY MOUTH EVERY DAY    gentamicin (GARAMYCIN) 0.3 % (3  mg/gram) ophthalmic ointment Place 0.5 inches into both eyes every evening.    methotrexate 2.5 MG Tab TAKE 4 TABLETS BY MOUTH EVERY SEVEN DAYS    multivitamin capsule Take 1 capsule by mouth once daily.      ONETOUCH ULTRA TEST Strp USE TO TEST THREE TIMES DAILY.    potassium chloride (KLOR-CON) 10 MEQ TbSR TAKE 1 TABLET BY MOUTH DAILY    predniSONE (DELTASONE) 5 MG tablet Take 2 tablets (10 mg total) by mouth every other day.    sitagliptan-metformin (JANUMET)  mg per tablet Take 1 tablet by mouth 2 (two) times daily.       Review of patient's allergies indicates:   Allergen Reactions    Erythromycin Shortness Of Breath     All mycins per patient  -affects myasthenia gravis    Sulfa (sulfonamide antibiotics)         Past Medical History:   Diagnosis Date    Anemia     Arthritis of hand 2013    Cataract     Diabetes mellitus     Diabetic retinopathy     GERD (gastroesophageal reflux disease)     HTN (hypertension) 2013    Hypertension     Myasthenia gravis     Osteoporosis     Type II or unspecified type diabetes mellitus without mention of complication, uncontrolled 2013     Past Surgical History:   Procedure Laterality Date    CATARACT EXTRACTION       SECTION      COLONOSCOPY N/A 2017    Procedure: COLONOSCOPY;  Surgeon: Greg Salinas MD;  Location: [address]);  Service: Endoscopy;  Laterality: N/A;  hx of Myasthenia Gravis    HYSTERECTOMY      s/p avastin   12    right eye    s/p avastin ou  12    s/p focal laser  12    left eye     Family History   Problem Relation Age of Onset    Diabetes Mother     Celiac disease Neg Hx     Colon polyps Neg Hx     Colon cancer Neg Hx     Esophageal cancer Neg Hx     Inflammatory bowel disease Neg Hx     Irritable bowel syndrome Neg Hx     Stomach cancer Neg Hx      Social History   Substance Use Topics    Smoking status: Former Smoker     Quit date:     Smokeless tobacco:  Never Used    Alcohol use No       Review of Systems:  Constitutional: no fever or chills  Respiratory: no cough or shortness of breath  Cardiovascular: no chest pain or palpitations  Gastrointestinal: no nausea or vomiting, no abdominal pain or change in bowel habits    OBJECTIVE:     Vital Signs (Most Recent)       Physical Exam:  General: well developed, well nourished  Lungs:  clear to auscultation bilaterally and normal respiratory effort  Heart: regular rate, S1, S2 normal  Abdomen: soft, non-tender non-distented; bowel sounds normal; no masses,  no organomegaly    Laboratory  CBC: No results for input(s): WBC, RBC, HGB, HCT, PLT, MCV, MCH, MCHC in the last 168 hours.  CMP: No results for input(s): GLU, CALCIUM, ALBUMIN, PROT, NA, K, CO2, CL, BUN, CREATININE, ALKPHOS, ALT, AST, BILITOT in the last 168 hours.  Coagulation: No results for input(s): INR, APTT in the last 168 hours.    Invalid input(s): PT      Diagnostic Results:      ASSESSMENT/PLAN:     Colon polyp    Plan: Colonoscopy and possible EMR    Anesthesia Plan: MAC    ASA Grade: ASA 2 - Patient with mild systemic disease with no functional limitations      The impression and plan was discussed in detail with the patient and family. All questions have been answered and the patient voices understanding of our plan at this point. The risk of the procedure was discussed in detail which includes but not limited to bleeding, infection, perforation in some cases requiring surgery with its spectrum of complications.

## 2017-05-23 NOTE — PLAN OF CARE
D/C instructions given to pt and family. Pt instructed to start aspirin again in one week.Pt. Tolerating po fluids and denies pain and n/v at this time. IV dc'd. VSS. Family at bs for d/c. All consents and AVS in chart at time of discharge.

## 2017-05-23 NOTE — TRANSFER OF CARE
"Anesthesia Transfer of Care Note    Patient: Teresita Tolbert    Procedure(s) Performed: Procedure(s) (LRB):  COLONOSCOPY/EMR (N/A)    Patient location: [facility]    Anesthesia Type: general    Transport from OR: Transported from OR on room air with adequate spontaneous ventilation    Post pain: adequate analgesia    Post assessment: no apparent anesthetic complications and tolerated procedure well    Post vital signs: stable    Level of consciousness: lethargic    Nausea/Vomiting: no nausea/vomiting    Complications: none    Transfer of care protocol was followed      Last vitals:   Visit Vitals  BP (!) 141/64 (BP Location: Left arm, Patient Position: Lying, BP Method: Automatic)   Pulse 85   Temp 36.9 °C (98.5 °F) (Oral)   Resp 16   Ht 5' 2" (1.575 m)   Wt 57.6 kg (127 lb)   SpO2 99%   Breastfeeding? No   BMI 23.23 kg/m²     "

## 2017-05-23 NOTE — DISCHARGE INSTRUCTIONS
Colonoscopy     A camera attached to a flexible tube with a viewing lens is used to take video pictures.     Colonoscopy is a test to view the inside of your lower digestive tract (colon and rectum). Sometimes it can show the last part of the small intestine (ileum). During the test, small pieces of tissue may be removed for testing. This is called a biopsy. Small growths, such as polyps, may also be removed.   Why is colonoscopy done?  The test is done to help look for colon cancer. And it can help find the source of abdominal pain, bleeding, and changes in bowel habits. It may be needed once a year, depending on factors such as your:  · Age  · Health history  · Family health history  · Symptoms  · Results from any prior colonoscopy  Risks and possible complications  These include:  · Bleeding               · A puncture or tear in the colon   · Risks of anesthesia  · A cancer lesion not being seen  Getting ready   To prepare for the test:  · Talk with your healthcare provider about the risks of the test (see below). Also ask your healthcare provider about alternatives to the test.  · Tell your healthcare provider about any medicines you take. Also tell him or her about any health conditions you may have.  · Make sure your rectum and colon are empty for the test. Follow the diet and bowel prep instructions exactly. If you dont, the test may need to be rescheduled.  · Plan for a friend or family member to drive you home after the test.     Colonoscopy provides an inside view of the entire colon.     You may discuss the results with your doctor right away or at a future visit.  During the test   The test is usually done in the hospital on an outpatient basis. This means you go home the same day. The procedure takes about 30 minutes. During that time:  · You are given relaxing (sedating) medicine through an IV line. You may be drowsy, or fully asleep.  · The healthcare provider will first give you a physical exam to  check for anal and rectal problems.  · Then the anus is lubricated and the scope inserted.  · If you are awake, you may have a feeling similar to needing to have a bowel movement. You may also feel pressure as air is pumped into the colon. Its OK to pass gas during the procedure.  · Biopsy, polyp removal, or other treatments may be done during the test.  After the test   You may have gas right after the test. It can help to try to pass it to help prevent later bloating. Your healthcare provider may discuss the results with you right away. Or you may need to schedule a follow-up visit to talk about the results. After the test, you can go back to your normal eating and other activities. You may be tired from the sedation and need to rest for a few hours.  Date Last Reviewed: 11/1/2016  © 9225-1509 [employer]. [address] [place] [address], [place] [address]. All rights reserved. This information is not intended as a substitute for professional medical care. Always follow your healthcare professional's instructions.

## 2017-05-23 NOTE — DISCHARGE SUMMARY
Discharge Summary/Instructions for after Colonoscopy with Biopsy/Polypectomy    Patient Name: Teresita Tolbert  Patient MRN: [id number]  Patient YOB: 1940    Tuesday, May 23, 2017  Addy Hale MD    RESTRICTIONS ON ACTIVITY:    - Do not drive a car or operate machinery until the day after the procedure.    - The following day: return to full activity including work.  - For  3 days: No heavy lifting, straining or running.  - Diet: Eat and drink normally unless instructed otherwise.    TREATMENT FOR COMMON SIDE EFFECTS:  - Mild abdominal pain and bloating or excessive gas: rest, eat lightly and use a heating pad.    SYMPTOMS TO WATCH FOR AND REPORT TO YOUR PHYSICIAN:  1. Severe abdominal pain.  2. Fever within 24 hours after a procedure.  3. A large amount of rectal bleeding. (A small amount of blood from the rectum is not serious, especially if hemorrhoids are present.  4. Because air was put into your colon during the procedure, expelling large amounts of air from your rectum is normal.  5. You may not have a bowel movement for 1-3 days because of the colonoscopy prep.  This is normal.  6. Go directly to the emergency room if you notice any of the following:     Chills and/or fever over 101   Persistent vomiting   Severe abdominal pain, other than gas cramps   Severe chest pain   Black, tarry stools   Any bleeding - exceeding one tablespoon    Your doctor recommends these additional instructions:  If any biopsies were performed, my office will call you in 5 to 6 business days with any results.    You are being discharged to home.   We are waiting for your pathology results.   Your physician has recommended a repeat colonoscopy in three months for surveillance after today's piecemeal polypectomy.   The findings and recommendations were discussed with your family.   Resume your previous diet.   Do not take any aspirin, ibuprofen (including Advil, Motrin or Nuprin), naproxen (including Aleve), or any other  non-steroidal anti-inflammatory drugs for 4 weeks after your polyp removal.    If you have any questions or problems, please call your physician - Addy Hale MD at Work:  [phone].    LAB RESULTS: [phone]    [facility], EMERGENCY ROOM PHONE NUMBER: [phone]    IF A COMPLICATION OR EMERGENCY SITUATION ARISES AND YOU ARE UNABLE TO REACH YOUR PHYSICIAN - GO TO THE EMERGENCY ROOM.

## 2017-05-23 NOTE — ANESTHESIA POSTPROCEDURE EVALUATION
"Anesthesia Post Evaluation    Patient: Teresita Tolbert    Procedure(s) Performed: Procedure(s) (LRB):  COLONOSCOPY/EMR (N/A)    Final Anesthesia Type: general  Patient location during evaluation: PACU  Patient participation: Yes- Able to Participate  Level of consciousness: awake and alert  Post-procedure vital signs: reviewed and stable  Pain management: adequate  Airway patency: patent  PONV status at discharge: No PONV  Anesthetic complications: no      Cardiovascular status: blood pressure returned to baseline  Respiratory status: unassisted  Hydration status: euvolemic  Follow-up not needed.        Visit Vitals  /66   Pulse 69   Temp 36.9 °C (98.5 °F) (Oral)   Resp 16   Ht 5' 2" (1.575 m)   Wt 57.6 kg (127 lb)   SpO2 100%   Breastfeeding? No   BMI 23.23 kg/m²       Pain/Kassie Score: Pain Assessment Performed: Yes (5/23/2017 10:31 AM)  Presence of Pain: non-verbal indicators absent (5/23/2017 10:31 AM)  Kassie Score: 10 (5/23/2017 10:31 AM)      "

## 2017-05-23 NOTE — ANESTHESIA PREPROCEDURE EVALUATION
05/23/2017  Teresita Tolbert is a 76 y.o., female.    Anesthesia Evaluation    I have reviewed the Patient Summary Reports.     I have reviewed the Medications.   Prednisone    Review of Systems  Anesthesia Hx:  No problems with previous Anesthesia  History of prior surgery of interest to airway management or planning: Previous anesthesia: General   Social:  Former Smoker, No Alcohol Use    Hematology/Oncology:  Hematology Normal   Oncology Normal     EENT/Dental:EENT/Dental Normal   Cardiovascular:   Exercise tolerance: poor Hypertension, well controlled    Pulmonary:  Pulmonary Normal    Renal/:  Renal/ Normal     Hepatic/GI:   GERD, well controlled    Musculoskeletal:  Musculoskeletal Normal    Neurological:   Neuromuscular Disease,    Endocrine:   Diabetes, well controlled, type 2    Dermatological:  Skin Normal    Psych:  Psychiatric Normal           Physical Exam  General:  Well nourished    Airway/Jaw/Neck:  Airway Findings: Mouth Opening: Normal Tongue: Normal  General Airway Assessment: Adult  Mallampati: II  TM Distance: Normal, at least 6 cm  Jaw/Neck Findings:  Neck ROM: Normal ROM      Dental:  Dental Findings: In tact         Mental Status:  Mental Status Findings:  Cooperative, Alert and Oriented         Anesthesia Plan  Type of Anesthesia, risks & benefits discussed:  Anesthesia Type:  general, MAC  Patient's Preference: GA  Intra-op Monitoring Plan: standard ASA monitors  Intra-op Monitoring Plan Comments:   Post Op Pain Control Plan: IV/PO Opiods PRN  Post Op Pain Control Plan Comments:   Induction:   IV  Beta Blocker:  Patient is not currently on a Beta-Blocker (No further documentation required).       Informed Consent: Patient understands risks and agrees with Anesthesia plan.  Questions answered. Anesthesia consent signed with patient.  ASA Score: 3     Day of Surgery Review of History  & Physical:  There are no significant changes.  H&P update referred to the provider.         Ready For Surgery From Anesthesia Perspective.

## 2017-05-23 NOTE — PATIENT INSTRUCTIONS
Discharge Summary/Instructions for after Colonoscopy with   Biopsy/Polypectomy  Patient Name: Teresita Tolbert  Patient MRN: [id number]  Patient YOB: 1940  Tuesday, May 23, 2017  Addy Hale MD  RESTRICTIONS ON ACTIVITY:  - Do not drive a car or operate machinery until the day after the procedure.      - The following day: return to full activity including work.  - For  3 days: No heavy lifting, straining or running.  - Diet: Eat and drink normally unless instructed otherwise.  TREATMENT FOR COMMON SIDE EFFECTS:  - Mild abdominal pain and bloating or excessive gas: rest, eat lightly and   use a heating pad.  SYMPTOMS TO WATCH FOR AND REPORT TO YOUR PHYSICIAN:  1. Severe abdominal pain.  2. Fever within 24 hours after a procedure.  3. A large amount of rectal bleeding. (A small amount of blood from the   rectum is not serious, especially if hemorrhoids are present.  4. Because air was put into your colon during the procedure, expelling large   amounts of air from your rectum is normal.  5. You may not have a bowel movement for 1-3 days because of the colonoscopy   prep.  This is normal.  6. Go directly to the emergency room if you notice any of the following:   Chills and/or fever over 101   Persistent vomiting   Severe abdominal pain, other than gas cramps   Severe chest pain   Black, tarry stools   Any bleeding - exceeding one tablespoon  Your doctor recommends these additional instructions:  If any biopsies were performed, my office will call you in 5 to 6 business   days with any results.  You are being discharged to home.   We are waiting for your pathology results.   Your physician has recommended a repeat colonoscopy in three months for   surveillance after today's piecemeal polypectomy.   The findings and recommendations were discussed with your family.   Resume your previous diet.   Do not take any aspirin, ibuprofen (including Advil, Motrin or Nuprin),   naproxen (including Aleve), or any other  non-steroidal anti-inflammatory   drugs for 4 weeks after your polyp removal.  If you have any questions or problems, please call your physician - Addy Hale MD at Work:  [phone].    LAB RESULTS: [phone]  [facility], EMERGENCY ROOM PHONE NUMBER: [phone]  IF A COMPLICATION OR EMERGENCY SITUATION ARISES AND YOU ARE UNABLE TO REACH   YOUR PHYSICIAN - GO TO THE EMERGENCY ROOM.  Addy Hale MD  5/23/2017 10:40:25 AM  This report has been verified and signed electronically.

## 2017-05-29 RX ORDER — BLOOD SUGAR DIAGNOSTIC
STRIP MISCELLANEOUS
Qty: 300 STRIP | Refills: 0 | Status: SHIPPED | OUTPATIENT
Start: 2017-05-29 | End: 2018-01-30 | Stop reason: SDUPTHER

## 2017-05-30 ENCOUNTER — TELEPHONE (OUTPATIENT)
Dept: GASTROENTEROLOGY | Facility: CLINIC | Age: 77
End: 2017-05-30

## 2017-05-30 NOTE — TELEPHONE ENCOUNTER
----- Message from Addy Hale MD sent at 5/30/2017  3:20 PM CDT -----  Please let the patient know the EMR showed Tubulovillous adenoma with HGD. No evidence of cancer. Need colonoscopy in 3 months.

## 2017-05-31 ENCOUNTER — PATIENT MESSAGE (OUTPATIENT)
Dept: INTERNAL MEDICINE | Facility: CLINIC | Age: 77
End: 2017-05-31

## 2017-05-31 ENCOUNTER — OFFICE VISIT (OUTPATIENT)
Dept: INTERNAL MEDICINE | Facility: CLINIC | Age: 77
End: 2017-05-31
Payer: MEDICARE

## 2017-05-31 ENCOUNTER — LAB VISIT (OUTPATIENT)
Dept: LAB | Facility: HOSPITAL | Age: 77
End: 2017-05-31
Attending: INTERNAL MEDICINE
Payer: MEDICARE

## 2017-05-31 VITALS
HEIGHT: 62 IN | BODY MASS INDEX: 23.93 KG/M2 | WEIGHT: 130.06 LBS | SYSTOLIC BLOOD PRESSURE: 137 MMHG | HEART RATE: 67 BPM | DIASTOLIC BLOOD PRESSURE: 76 MMHG

## 2017-05-31 DIAGNOSIS — E11.8 TYPE 2 DIABETES MELLITUS WITH COMPLICATION, WITHOUT LONG-TERM CURRENT USE OF INSULIN: ICD-10-CM

## 2017-05-31 DIAGNOSIS — E11.3513 DIABETIC MACULAR EDEMA OF BOTH EYES WITH PROLIFERATIVE RETINOPATHY ASSOCIATED WITH TYPE 2 DIABETES MELLITUS: ICD-10-CM

## 2017-05-31 DIAGNOSIS — I10 ESSENTIAL HYPERTENSION: ICD-10-CM

## 2017-05-31 DIAGNOSIS — G70.00 MYASTHENIA GRAVIS STATUS POST THYMECTOMY: ICD-10-CM

## 2017-05-31 DIAGNOSIS — M05.79 RHEUMATOID ARTHRITIS INVOLVING MULTIPLE SITES WITH POSITIVE RHEUMATOID FACTOR: ICD-10-CM

## 2017-05-31 DIAGNOSIS — D50.9 IRON DEFICIENCY ANEMIA, UNSPECIFIED IRON DEFICIENCY ANEMIA TYPE: ICD-10-CM

## 2017-05-31 DIAGNOSIS — E11.3513 DIABETIC MACULAR EDEMA OF BOTH EYES WITH PROLIFERATIVE RETINOPATHY ASSOCIATED WITH TYPE 2 DIABETES MELLITUS: Primary | ICD-10-CM

## 2017-05-31 DIAGNOSIS — Z90.89 MYASTHENIA GRAVIS STATUS POST THYMECTOMY: ICD-10-CM

## 2017-05-31 LAB
CREAT UR-MCNC: 62 MG/DL
MICROALBUMIN UR DL<=1MG/L-MCNC: 13 UG/ML
MICROALBUMIN/CREATININE RATIO: 21 UG/MG

## 2017-05-31 PROCEDURE — 82570 ASSAY OF URINE CREATININE: CPT

## 2017-05-31 PROCEDURE — 99999 PR PBB SHADOW E&M-EST. PATIENT-LVL IV: CPT | Mod: PBBFAC,,, | Performed by: INTERNAL MEDICINE

## 2017-05-31 PROCEDURE — 99214 OFFICE O/P EST MOD 30 MIN: CPT | Mod: S$PBB,,, | Performed by: INTERNAL MEDICINE

## 2017-05-31 RX ORDER — POTASSIUM CHLORIDE 750 MG/1
10 TABLET, EXTENDED RELEASE ORAL DAILY
Qty: 30 TABLET | Refills: 12 | Status: SHIPPED | OUTPATIENT
Start: 2017-05-31 | End: 2018-06-08 | Stop reason: SDUPTHER

## 2017-05-31 NOTE — PROGRESS NOTES
Subjective:       Patient ID: Teresita Tolbert is a 76 y.o. female.    Chief Complaint: Follow-up    Patient: Patient attended by her son Pablo comes in for follow-up of anemia.  The patient has myasthenia, rheumatoid arthritis, anemia and diabetes.  Her A1c is stable and her blood count is increasing.  He was having some high grade dysplasia on recent colonoscopy so they're following it closely, every few months.  For that has gone well and there was no problem.  She continues to see gastroenterology specialist she needed her potassium refilled.  Otherwise labs are up-to-date.      Review of Systems   Constitutional: Positive for fatigue (patient does tire easily). Negative for appetite change, chills and fever.   HENT: Negative for sore throat.    Respiratory: Negative for cough, shortness of breath and wheezing.    Cardiovascular: Negative for chest pain and leg swelling.   Gastrointestinal: Negative for abdominal pain, constipation and diarrhea.   Genitourinary: Negative for difficulty urinating.   Musculoskeletal: Positive for arthralgias and joint swelling. Negative for neck pain and neck stiffness.   Skin: Negative for rash.   Neurological: Positive for speech difficulty (secondary to large tongue, chronic).       Objective:      Physical Exam   Constitutional: She is oriented to person, place, and time. She appears well-developed and well-nourished. No distress.   HENT:   Head: Normocephalic and atraumatic.   Mouth/Throat: Oropharynx is clear and moist. No oropharyngeal exudate.   Large tongue, chronic   Eyes: Conjunctivae are normal. Pupils are equal, round, and reactive to light. No scleral icterus.   Neck: Normal range of motion. Neck supple. No thyromegaly present.   Cardiovascular: Normal rate and regular rhythm.    No murmur heard.  Pulmonary/Chest: Effort normal and breath sounds normal. She has no wheezes.   Abdominal: Soft. Bowel sounds are normal. She exhibits no distension. There is no tenderness.    Musculoskeletal: She exhibits deformity (Scondary to rheumatoid arthritis). She exhibits no tenderness.   Lymphadenopathy:     She has no cervical adenopathy.   Neurological: She is alert and oriented to person, place, and time.   Skin: No rash noted.   Psychiatric: She has a normal mood and affect.       Assessment:       1. 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 Type 2 diabetes mellitus with complication, without long-term current use of insulin    3. Essential hypertension    4. Myasthenia gravis status post thymectomy    5. Rheumatoid arthritis involving multiple sites with positive rheumatoid factor    6. Iron deficiency anemia, unspecified iron deficiency anemia type        Plan:       Teresita was seen today for follow-up.    Diagnoses and all orders for this visit:    Diabetic macular edema of both eyes with proliferative retinopathy associated with type 2 diabetes mellitus  -     Microalbumin/creatinine urine ratio; Future    Type 2 diabetes mellitus with complication, without long-term current use of insulin  -     Microalbumin/creatinine urine ratio; Future    Essential hypertension    Myasthenia gravis status post thymectomy    Rheumatoid arthritis involving multiple sites with positive rheumatoid factor    Iron deficiency anemia, unspecified iron deficiency anemia type    Other orders  -     potassium chloride (KLOR-CON) 10 MEQ TbSR; Take 1 tablet (10 mEq total) by mouth once daily.

## 2017-06-20 RX ORDER — ATENOLOL 50 MG/1
TABLET ORAL
Qty: 60 TABLET | Refills: 0 | Status: SHIPPED | OUTPATIENT
Start: 2017-06-20 | End: 2017-07-21 | Stop reason: SDUPTHER

## 2017-06-20 RX ORDER — AMLODIPINE BESYLATE 5 MG/1
TABLET ORAL
Qty: 30 TABLET | Refills: 0 | Status: SHIPPED | OUTPATIENT
Start: 2017-06-20 | End: 2017-07-21 | Stop reason: SDUPTHER

## 2017-06-23 ENCOUNTER — TELEPHONE (OUTPATIENT)
Dept: GASTROENTEROLOGY | Facility: CLINIC | Age: 77
End: 2017-06-23

## 2017-06-23 DIAGNOSIS — K63.5 POLYP OF COLON, UNSPECIFIED PART OF COLON, UNSPECIFIED TYPE: Primary | ICD-10-CM

## 2017-06-27 ENCOUNTER — PROCEDURE VISIT (OUTPATIENT)
Dept: OPHTHALMOLOGY | Facility: CLINIC | Age: 77
End: 2017-06-27
Payer: MEDICARE

## 2017-06-27 VITALS — DIASTOLIC BLOOD PRESSURE: 72 MMHG | SYSTOLIC BLOOD PRESSURE: 144 MMHG | HEART RATE: 75 BPM

## 2017-06-27 DIAGNOSIS — E11.3513 DIABETIC MACULAR EDEMA OF BOTH EYES WITH PROLIFERATIVE RETINOPATHY ASSOCIATED WITH TYPE 2 DIABETES MELLITUS: Primary | ICD-10-CM

## 2017-06-27 DIAGNOSIS — H43.813 POSTERIOR VITREOUS DETACHMENT OF BOTH EYES: ICD-10-CM

## 2017-06-27 PROCEDURE — 67028 INJECTION EYE DRUG: CPT | Mod: S$PBB,LT,, | Performed by: OPHTHALMOLOGY

## 2017-06-27 PROCEDURE — 92134 CPTRZ OPH DX IMG PST SGM RTA: CPT | Mod: PBBFAC | Performed by: OPHTHALMOLOGY

## 2017-06-27 PROCEDURE — 67028 INJECTION EYE DRUG: CPT | Mod: PBBFAC,LT | Performed by: OPHTHALMOLOGY

## 2017-06-27 PROCEDURE — 92014 COMPRE OPH EXAM EST PT 1/>: CPT | Mod: 25,S$PBB,, | Performed by: OPHTHALMOLOGY

## 2017-06-27 PROCEDURE — C9257 BEVACIZUMAB INJECTION: HCPCS | Mod: PBBFAC | Performed by: OPHTHALMOLOGY

## 2017-06-27 RX ADMIN — BEVACIZUMAB 1.25 MG: 100 INJECTION, SOLUTION INTRAVENOUS at 12:06

## 2017-06-27 NOTE — PATIENT INSTRUCTIONS

## 2017-06-27 NOTE — PROGRESS NOTES
OCT - DME OU - good focal OU  DME Stable, OD DME improved OS    FA shows good perfusion time OU, multiple late leakage points in macula OU, staining of peripheral laser scars OU, mild late leakage from nerve OS      A/P    1-PDR OS>OD:   - S/p PRP OD (06/16/15)   - S/p PRP OS (07/07/15)   - Last HbA1c was 6.4 on 4/8/15   - Emphasized the importance of tight glucose control    2-DME OU   - s/p focal OU   -  Increase in cystoid fluid OS  S/p resumed avastin OS x 2    Repeat Avastin OS today    3-PCIOL OU    4-Ectropion OU:  Saw Mary for repair  Incomplete closer with some moderate inf PEEs         6-7 weeks OCT      Risks, benefits, and alternatives to treatment discussed in detail with the patient.  The patient voiced understanding and wished to proceed with the procedure    Injection Procedure Note:  Diagnosis: DME OS    Topical Proparacaine and Betadine.  Inject Avastin OS at 6:00 @ 3.5-4mm posterior to limbus  Post Operative Dx: Same  Complications: None  Follow up as above.

## 2017-06-28 ENCOUNTER — TELEPHONE (OUTPATIENT)
Dept: GASTROENTEROLOGY | Facility: CLINIC | Age: 77
End: 2017-06-28

## 2017-06-28 NOTE — TELEPHONE ENCOUNTER
----- Message from Renata Shen sent at 6/28/2017  8:41 AM CDT -----  Contact: Self- [phone]  Geoffrey- pt is returning a missed call to María regarding scheduling her c-scope- please call pt back at [phone]

## 2017-06-30 DIAGNOSIS — M05.79 RHEUMATOID ARTHRITIS INVOLVING MULTIPLE SITES WITH POSITIVE RHEUMATOID FACTOR: ICD-10-CM

## 2017-06-30 RX ORDER — METHOTREXATE 2.5 MG/1
TABLET ORAL
Qty: 20 TABLET | Refills: 0 | Status: SHIPPED | OUTPATIENT
Start: 2017-06-30 | End: 2017-08-04 | Stop reason: SDUPTHER

## 2017-06-30 NOTE — TELEPHONE ENCOUNTER
Please arrange for her to get CMP for liver enzymes with next visit to [facility], then schedule Q 3 mo MTX lab

## 2017-07-03 DIAGNOSIS — G70.00 MYASTHENIA GRAVIS STATUS POST THYMECTOMY: ICD-10-CM

## 2017-07-03 DIAGNOSIS — Z90.89 MYASTHENIA GRAVIS STATUS POST THYMECTOMY: ICD-10-CM

## 2017-07-06 ENCOUNTER — TELEPHONE (OUTPATIENT)
Dept: NEUROLOGY | Facility: CLINIC | Age: 77
End: 2017-07-06

## 2017-07-06 RX ORDER — PREDNISONE 5 MG/1
TABLET ORAL
Qty: 90 TABLET | Refills: 0 | Status: SHIPPED | OUTPATIENT
Start: 2017-07-06 | End: 2017-08-31 | Stop reason: SDUPTHER

## 2017-07-18 ENCOUNTER — INFUSION (OUTPATIENT)
Dept: INFECTIOUS DISEASES | Facility: HOSPITAL | Age: 77
End: 2017-07-18
Attending: INTERNAL MEDICINE
Payer: MEDICARE

## 2017-07-18 VITALS — HEIGHT: 62 IN | WEIGHT: 130.06 LBS | BODY MASS INDEX: 23.93 KG/M2

## 2017-07-18 DIAGNOSIS — M81.0 SENILE OSTEOPOROSIS: Primary | ICD-10-CM

## 2017-07-18 PROCEDURE — 63600175 PHARM REV CODE 636 W HCPCS: Performed by: INTERNAL MEDICINE

## 2017-07-18 PROCEDURE — 96401 CHEMO ANTI-NEOPL SQ/IM: CPT

## 2017-07-18 RX ADMIN — DENOSUMAB 60 MG: 60 INJECTION SUBCUTANEOUS at 02:07

## 2017-07-21 RX ORDER — ATENOLOL 50 MG/1
TABLET ORAL
Qty: 60 TABLET | Refills: 0 | Status: SHIPPED | OUTPATIENT
Start: 2017-07-21 | End: 2017-08-22 | Stop reason: SDUPTHER

## 2017-07-21 RX ORDER — AMLODIPINE BESYLATE 5 MG/1
TABLET ORAL
Qty: 30 TABLET | Refills: 0 | Status: SHIPPED | OUTPATIENT
Start: 2017-07-21 | End: 2017-08-21 | Stop reason: SDUPTHER

## 2017-08-04 DIAGNOSIS — D50.9 IRON DEFICIENCY ANEMIA, UNSPECIFIED IRON DEFICIENCY ANEMIA TYPE: ICD-10-CM

## 2017-08-04 DIAGNOSIS — M05.79 RHEUMATOID ARTHRITIS INVOLVING MULTIPLE SITES WITH POSITIVE RHEUMATOID FACTOR: ICD-10-CM

## 2017-08-04 RX ORDER — METHOTREXATE 2.5 MG/1
TABLET ORAL
Qty: 20 TABLET | Refills: 0 | Status: SHIPPED | OUTPATIENT
Start: 2017-08-04 | End: 2017-09-14 | Stop reason: SDUPTHER

## 2017-08-04 RX ORDER — FERROUS SULFATE 325(65) MG
TABLET ORAL
Qty: 30 TABLET | Refills: 0 | Status: SHIPPED | OUTPATIENT
Start: 2017-08-04 | End: 2017-09-05 | Stop reason: SDUPTHER

## 2017-08-08 ENCOUNTER — PROCEDURE VISIT (OUTPATIENT)
Dept: OPHTHALMOLOGY | Facility: CLINIC | Age: 77
End: 2017-08-08
Payer: MEDICARE

## 2017-08-08 VITALS — HEART RATE: 78 BPM | DIASTOLIC BLOOD PRESSURE: 77 MMHG | SYSTOLIC BLOOD PRESSURE: 153 MMHG

## 2017-08-08 DIAGNOSIS — E11.3513 DIABETIC MACULAR EDEMA OF BOTH EYES WITH PROLIFERATIVE RETINOPATHY ASSOCIATED WITH TYPE 2 DIABETES MELLITUS: ICD-10-CM

## 2017-08-08 DIAGNOSIS — E11.3513 CONTROLLED TYPE 2 DIABETES MELLITUS WITH BOTH EYES AFFECTED BY PROLIFERATIVE RETINOPATHY AND MACULAR EDEMA, WITHOUT LONG-TERM CURRENT USE OF INSULIN: Primary | ICD-10-CM

## 2017-08-08 DIAGNOSIS — H35.371 EPIRETINAL MEMBRANE, RIGHT: ICD-10-CM

## 2017-08-08 DIAGNOSIS — H43.813 POSTERIOR VITREOUS DETACHMENT OF BOTH EYES: ICD-10-CM

## 2017-08-08 PROCEDURE — 92134 CPTRZ OPH DX IMG PST SGM RTA: CPT | Mod: PBBFAC | Performed by: OPHTHALMOLOGY

## 2017-08-08 PROCEDURE — 67028 INJECTION EYE DRUG: CPT | Mod: PBBFAC,LT | Performed by: OPHTHALMOLOGY

## 2017-08-08 PROCEDURE — 67028 INJECTION EYE DRUG: CPT | Mod: S$PBB,LT,, | Performed by: OPHTHALMOLOGY

## 2017-08-08 PROCEDURE — C9257 BEVACIZUMAB INJECTION: HCPCS | Mod: PBBFAC | Performed by: OPHTHALMOLOGY

## 2017-08-08 PROCEDURE — 92014 COMPRE OPH EXAM EST PT 1/>: CPT | Mod: 25,S$PBB,, | Performed by: OPHTHALMOLOGY

## 2017-08-08 RX ADMIN — BEVACIZUMAB 1.25 MG: 100 INJECTION, SOLUTION INTRAVENOUS at 12:08

## 2017-08-08 NOTE — PATIENT INSTRUCTIONS
POST INTRAVITREAL INJECTION PATIENT INSTRUCTIONS   Below are some guidelines for what to expect after your treatment and additional care instructions.   * you may experience mild discomfort in your eye after the treatment. Your eye usually feels better within 24 to 48 hours after the procedure.   * you have been given drops that numb the surface of your eye.   DO NOT rub or touch your eye, DO NOT wear contacts until numbness goes away.   * you may experience small spots that appear in your field of vision, these are usually caused by an air bubble or from the medication. It taked a few hours or days for these to go away.   * use of sunglasses will help reduce light sensitivity and glare.   * DO NOT swim or put sink water ( tap water ) or swin for at least 24 hours after the injection   * If you have a gritty, burning, irritating or stinging feeling in the injected eye. This may be a result of the antiseptic used. Use artifical tears or eye lubricant ( from over- the- counter from your local pharmacy ). If you have some at home already please check the expiration date, so not to use anything contaminated in your eye. A cool pack over your eye brow above the injected eye may also relieve discomfort.   Call us right away or go to the Emergency Department if you have a dramatic decrease in vision or extreme pain in the eye.   [facility] [phone]

## 2017-08-08 NOTE — PROGRESS NOTES
HPI     6 wk / OCT / Avastin  DLS - 04/10/2017 Dr. Olsen                Pt sts no changes with [facility] since last exam  (-)Flashes (-)Floaters  (-)Photophobia  (-)Glare  Gtts : Systane PRN occasional itchy eyes       OCT - DME OU - good focal OU  DME Stable, OD DME improved OS    FA shows good perfusion time OU, multiple late leakage points in macula OU, staining of peripheral laser scars OU, mild late leakage from nerve OS      A/P    1-PDR OS>OD:   - S/p PRP OD (06/16/15)   - S/p PRP OS (07/07/15)   - Last HbA1c was 6.4 on 4/8/15   - Emphasized the importance of tight glucose control    2-DME OU   - s/p focal OU   -  Increase in cystoid fluid OS  S/p resumed avastin OS x 3    Repeat Avastin OS today    Will try extension OS - will likely need maintenance    3-PCIOL OU    4-Ectropion OU:  Saw Mary for repair  Incomplete closer with some moderate inf PEEs         9 weeks OCT      Risks, benefits, and alternatives to treatment discussed in detail with the patient.  The patient voiced understanding and wished to proceed with the procedure    Injection Procedure Note:  Diagnosis: DME OS    Topical Proparacaine and Betadine.  Inject Avastin OS at 6:00 @ 3.5-4mm posterior to limbus  Post Operative Dx: Same  Complications: None  Follow up as above.

## 2017-08-11 DIAGNOSIS — M05.9 SEROPOSITIVE RHEUMATOID ARTHRITIS: ICD-10-CM

## 2017-08-13 RX ORDER — FOLIC ACID 1 MG/1
TABLET ORAL
Qty: 90 TABLET | Refills: 0 | Status: SHIPPED | OUTPATIENT
Start: 2017-08-13 | End: 2017-11-14 | Stop reason: SDUPTHER

## 2017-08-21 RX ORDER — AMLODIPINE BESYLATE 5 MG/1
TABLET ORAL
Qty: 30 TABLET | Refills: 0 | Status: SHIPPED | OUTPATIENT
Start: 2017-08-21 | End: 2017-09-20 | Stop reason: SDUPTHER

## 2017-08-23 RX ORDER — ATENOLOL 50 MG/1
TABLET ORAL
Qty: 60 TABLET | Refills: 0 | Status: SHIPPED | OUTPATIENT
Start: 2017-08-23 | End: 2017-08-23 | Stop reason: SDUPTHER

## 2017-08-23 RX ORDER — ATENOLOL 50 MG/1
100 TABLET ORAL DAILY
Qty: 60 TABLET | Refills: 0 | Status: SHIPPED | OUTPATIENT
Start: 2017-08-23 | End: 2017-08-31

## 2017-08-23 NOTE — TELEPHONE ENCOUNTER
----- Message from Prerna Daniels sent at 8/23/2017 12:35 PM CDT -----  Contact: Self/ [phone]   Type: Rx    Name of medication(s): atenolol (TENORMIN) 50 MG tablet    Is this a refill? New rx? Refill     Who prescribed medication? Dr. Barrera     Pharmacy Name, Phone, & Location: [facility] on file     Comments: pt is calling to have a refill on the medication above. Please call and advise     Thank you

## 2017-08-31 ENCOUNTER — OFFICE VISIT (OUTPATIENT)
Dept: INTERNAL MEDICINE | Facility: CLINIC | Age: 77
End: 2017-08-31
Payer: MEDICARE

## 2017-08-31 VITALS
HEART RATE: 94 BPM | WEIGHT: 132.94 LBS | DIASTOLIC BLOOD PRESSURE: 62 MMHG | BODY MASS INDEX: 24.46 KG/M2 | HEIGHT: 62 IN | SYSTOLIC BLOOD PRESSURE: 130 MMHG

## 2017-08-31 DIAGNOSIS — G70.00 MYASTHENIA GRAVIS STATUS POST THYMECTOMY: Primary | ICD-10-CM

## 2017-08-31 DIAGNOSIS — I10 ESSENTIAL HYPERTENSION: ICD-10-CM

## 2017-08-31 DIAGNOSIS — E11.319: ICD-10-CM

## 2017-08-31 DIAGNOSIS — E11.3513 DIABETIC MACULAR EDEMA OF BOTH EYES WITH PROLIFERATIVE RETINOPATHY ASSOCIATED WITH TYPE 2 DIABETES MELLITUS: ICD-10-CM

## 2017-08-31 DIAGNOSIS — Z90.89 MYASTHENIA GRAVIS STATUS POST THYMECTOMY: Primary | ICD-10-CM

## 2017-08-31 DIAGNOSIS — E11.65: ICD-10-CM

## 2017-08-31 PROCEDURE — 3078F DIAST BP <80 MM HG: CPT | Mod: ,,, | Performed by: INTERNAL MEDICINE

## 2017-08-31 PROCEDURE — 1126F AMNT PAIN NOTED NONE PRSNT: CPT | Mod: ,,, | Performed by: INTERNAL MEDICINE

## 2017-08-31 PROCEDURE — 99214 OFFICE O/P EST MOD 30 MIN: CPT | Mod: S$PBB,,, | Performed by: INTERNAL MEDICINE

## 2017-08-31 PROCEDURE — 1159F MED LIST DOCD IN RCRD: CPT | Mod: ,,, | Performed by: INTERNAL MEDICINE

## 2017-08-31 PROCEDURE — 3075F SYST BP GE 130 - 139MM HG: CPT | Mod: ,,, | Performed by: INTERNAL MEDICINE

## 2017-08-31 PROCEDURE — 99999 PR PBB SHADOW E&M-EST. PATIENT-LVL IV: CPT | Mod: PBBFAC,,, | Performed by: INTERNAL MEDICINE

## 2017-08-31 PROCEDURE — 99214 OFFICE O/P EST MOD 30 MIN: CPT | Mod: PBBFAC | Performed by: INTERNAL MEDICINE

## 2017-08-31 RX ORDER — PREDNISONE 5 MG/1
TABLET ORAL
Qty: 90 TABLET | Refills: 0 | Status: SHIPPED | OUTPATIENT
Start: 2017-08-31 | End: 2017-10-11 | Stop reason: SDUPTHER

## 2017-08-31 RX ORDER — METOPROLOL SUCCINATE 50 MG/1
50 TABLET, EXTENDED RELEASE ORAL DAILY
Qty: 30 TABLET | Refills: 11 | Status: SHIPPED | OUTPATIENT
Start: 2017-08-31 | End: 2017-10-19 | Stop reason: SDUPTHER

## 2017-08-31 NOTE — PROGRESS NOTES
Subjective:       Patient ID: Teresita Tolbert is a 76 y.o. female.    Chief Complaint: Follow-up    Patient attended by her son here to follow-up hypertension diabetes and myasthenia.  Her neurologist passed away and we discussed a few options for establishing with a new provider.  The patient denies any problems with appetite or bowel movements.  Muscles, movement have been stable.  Patient still sees rheumatology.  We will continue to monitor and prescribed her meds.  Her pharmacy informed her she cannot get atenolol at this time due to the national back or but I will substitute metoprolol 50 mg      Review of Systems   Constitutional: Negative for appetite change, chills and fever.   HENT: Negative for sore throat.    Respiratory: Negative for cough, shortness of breath and wheezing.    Cardiovascular: Negative for chest pain and leg swelling.   Gastrointestinal: Negative for abdominal pain, constipation and diarrhea.   Genitourinary: Negative for difficulty urinating.   Musculoskeletal: Positive for arthralgias. Negative for neck pain and neck stiffness.   Skin: Negative for rash.       Objective:      Physical Exam   Constitutional: She is oriented to person, place, and time. She appears well-developed and well-nourished. No distress.   HENT:   Head: Normocephalic and atraumatic.   Mouth/Throat: Oropharynx is clear and moist. No oropharyngeal exudate.   Eyes: Conjunctivae are normal. Pupils are equal, round, and reactive to light. No scleral icterus.   Neck: Normal range of motion. Neck supple. No thyromegaly present.   Cardiovascular: Normal rate and regular rhythm.    No murmur heard.  Pulmonary/Chest: Effort normal and breath sounds normal. She has no wheezes.   Abdominal: Soft. Bowel sounds are normal. She exhibits no distension. There is no tenderness.   Musculoskeletal: She exhibits tenderness (mild hand arthritis) and deformity.   Lymphadenopathy:     She has no cervical adenopathy.   Neurological: She is  alert and oriented to person, place, and time.   Skin: No rash noted.   Psychiatric: She has a normal mood and affect.       Assessment:       1. Myasthenia gravis status post thymectomy    2. Diabetic macular edema of both eyes with proliferative retinopathy associated with type 2 diabetes mellitus    3. Essential hypertension    4. Uncontrolled type 2 diabetes mellitus with retinopathy of both eyes, macular edema presence unspecified, unspecified long term insulin use status, unspecified retinopathy severity        Plan:       Teresita was seen today for follow-up.    Diagnoses and all orders for this visit:    Myasthenia gravis status post thymectomy  -     predniSONE (DELTASONE) 5 MG tablet; TAKE 2 TABLETS BY MOUTH EVERY OTHER DAY.    Diabetic macular edema of both eyes with proliferative retinopathy associated with type 2 diabetes mellitus    Essential hypertension    Uncontrolled type 2 diabetes mellitus with retinopathy of both eyes, macular edema presence unspecified, unspecified long term insulin use status, unspecified retinopathy severity    Other orders  -     metoprolol succinate (TOPROL-XL) 50 MG 24 hr tablet; Take 1 tablet (50 mg total) by mouth once daily.        follow-up 4-6 months

## 2017-09-04 RX ORDER — BLOOD SUGAR DIAGNOSTIC
STRIP MISCELLANEOUS
Qty: 150 STRIP | Refills: 0 | Status: SHIPPED | OUTPATIENT
Start: 2017-09-04 | End: 2017-10-04 | Stop reason: SDUPTHER

## 2017-09-05 DIAGNOSIS — D50.9 IRON DEFICIENCY ANEMIA, UNSPECIFIED IRON DEFICIENCY ANEMIA TYPE: ICD-10-CM

## 2017-09-06 RX ORDER — FERROUS SULFATE 325(65) MG
TABLET ORAL
Qty: 30 TABLET | Refills: 0 | Status: SHIPPED | OUTPATIENT
Start: 2017-09-06

## 2017-09-06 NOTE — TELEPHONE ENCOUNTER
[id number] - [place], [place] - [address] [facility] [address]: [phone]        Prescription pending

## 2017-09-11 ENCOUNTER — TELEPHONE (OUTPATIENT)
Dept: ENDOSCOPY | Facility: HOSPITAL | Age: 77
End: 2017-09-11

## 2017-09-11 DIAGNOSIS — Z12.11 SPECIAL SCREENING FOR MALIGNANT NEOPLASMS, COLON: Primary | ICD-10-CM

## 2017-09-11 RX ORDER — POLYETHYLENE GLYCOL 3350, SODIUM SULFATE ANHYDROUS, SODIUM BICARBONATE, SODIUM CHLORIDE, POTASSIUM CHLORIDE 236; 22.74; 6.74; 5.86; 2.97 G/4L; G/4L; G/4L; G/4L; G/4L
4 POWDER, FOR SOLUTION ORAL ONCE
Qty: 4000 ML | Refills: 0 | Status: SHIPPED | OUTPATIENT
Start: 2017-09-11 | End: 2017-09-11

## 2017-09-11 NOTE — TELEPHONE ENCOUNTER
Spoke with patient.  Colonoscopy scheduled 9/21/17 at 1100.  Instructions mailed to her and emailed to her son, Pbalo.

## 2017-09-11 NOTE — TELEPHONE ENCOUNTER
Please contact Pt  @ [phone]  to schedule colonoscopy with Dr. Hale on 2nd floor Endoscopy.

## 2017-09-14 DIAGNOSIS — M05.79 RHEUMATOID ARTHRITIS INVOLVING MULTIPLE SITES WITH POSITIVE RHEUMATOID FACTOR: ICD-10-CM

## 2017-09-14 RX ORDER — METHOTREXATE 2.5 MG/1
TABLET ORAL
Qty: 20 TABLET | Refills: 0 | Status: SHIPPED | OUTPATIENT
Start: 2017-09-14 | End: 2017-10-11 | Stop reason: SDUPTHER

## 2017-09-20 RX ORDER — AMLODIPINE BESYLATE 5 MG/1
TABLET ORAL
Qty: 30 TABLET | Refills: 0 | Status: SHIPPED | OUTPATIENT
Start: 2017-09-20 | End: 2017-10-23 | Stop reason: SDUPTHER

## 2017-09-21 ENCOUNTER — HOSPITAL ENCOUNTER (OUTPATIENT)
Facility: HOSPITAL | Age: 77
Discharge: HOME | End: 2017-09-21
Attending: INTERNAL MEDICINE | Admitting: INTERNAL MEDICINE
Payer: MEDICARE

## 2017-09-21 ENCOUNTER — ANESTHESIA (OUTPATIENT)
Dept: ENDOSCOPY | Facility: HOSPITAL | Age: 77
End: 2017-09-21
Payer: MEDICARE

## 2017-09-21 ENCOUNTER — SURGERY (OUTPATIENT)
Age: 77
End: 2017-09-21

## 2017-09-21 ENCOUNTER — ANESTHESIA EVENT (OUTPATIENT)
Dept: ENDOSCOPY | Facility: HOSPITAL | Age: 77
End: 2017-09-21
Payer: MEDICARE

## 2017-09-21 VITALS
OXYGEN SATURATION: 100 % | DIASTOLIC BLOOD PRESSURE: 64 MMHG | WEIGHT: 130 LBS | RESPIRATION RATE: 16 BRPM | TEMPERATURE: 98 F | HEIGHT: 60 IN | BODY MASS INDEX: 25.52 KG/M2 | HEART RATE: 93 BPM | SYSTOLIC BLOOD PRESSURE: 139 MMHG

## 2017-09-21 DIAGNOSIS — K63.5 POLYP OF COLON, UNSPECIFIED PART OF COLON, UNSPECIFIED TYPE: Primary | ICD-10-CM

## 2017-09-21 DIAGNOSIS — K63.5 COLON POLYP: ICD-10-CM

## 2017-09-21 LAB
POCT GLUCOSE: 117 MG/DL (ref 70–110)
POCT GLUCOSE: 174 MG/DL (ref 70–110)

## 2017-09-21 PROCEDURE — 45380 COLONOSCOPY AND BIOPSY: CPT | Performed by: INTERNAL MEDICINE

## 2017-09-21 PROCEDURE — 82962 GLUCOSE BLOOD TEST: CPT | Performed by: INTERNAL MEDICINE

## 2017-09-21 PROCEDURE — 37000009 HC ANESTHESIA EA ADD 15 MINS: Performed by: INTERNAL MEDICINE

## 2017-09-21 PROCEDURE — D9220A PRA ANESTHESIA: Mod: ANES,,, | Performed by: ANESTHESIOLOGY

## 2017-09-21 PROCEDURE — 45380 COLONOSCOPY AND BIOPSY: CPT | Mod: PT,,, | Performed by: INTERNAL MEDICINE

## 2017-09-21 PROCEDURE — 88305 TISSUE EXAM BY PATHOLOGIST: CPT | Performed by: PATHOLOGY

## 2017-09-21 PROCEDURE — 37000008 HC ANESTHESIA 1ST 15 MINUTES: Performed by: INTERNAL MEDICINE

## 2017-09-21 PROCEDURE — 27201012 HC FORCEPS, HOT/COLD, DISP: Performed by: INTERNAL MEDICINE

## 2017-09-21 PROCEDURE — 63600175 PHARM REV CODE 636 W HCPCS: Performed by: NURSE ANESTHETIST, CERTIFIED REGISTERED

## 2017-09-21 PROCEDURE — 88305 TISSUE EXAM BY PATHOLOGIST: CPT | Mod: 26,,, | Performed by: PATHOLOGY

## 2017-09-21 PROCEDURE — D9220A PRA ANESTHESIA: Mod: CRNA,,, | Performed by: NURSE ANESTHETIST, CERTIFIED REGISTERED

## 2017-09-21 PROCEDURE — 25000003 PHARM REV CODE 250: Performed by: INTERNAL MEDICINE

## 2017-09-21 RX ORDER — PROPOFOL 10 MG/ML
VIAL (ML) INTRAVENOUS CONTINUOUS PRN
Status: DISCONTINUED | OUTPATIENT
Start: 2017-09-21 | End: 2017-09-21

## 2017-09-21 RX ORDER — LIDOCAINE HCL/PF 100 MG/5ML
SYRINGE (ML) INTRAVENOUS
Status: DISCONTINUED | OUTPATIENT
Start: 2017-09-21 | End: 2017-09-21

## 2017-09-21 RX ORDER — PHENYLEPHRINE HYDROCHLORIDE 10 MG/ML
INJECTION INTRAVENOUS
Status: DISCONTINUED | OUTPATIENT
Start: 2017-09-21 | End: 2017-09-21

## 2017-09-21 RX ORDER — SODIUM CHLORIDE 0.9 % (FLUSH) 0.9 %
3 SYRINGE (ML) INJECTION
Status: DISCONTINUED | OUTPATIENT
Start: 2017-09-21 | End: 2017-09-21 | Stop reason: HOSPADM

## 2017-09-21 RX ORDER — PROPOFOL 10 MG/ML
VIAL (ML) INTRAVENOUS
Status: DISCONTINUED | OUTPATIENT
Start: 2017-09-21 | End: 2017-09-21

## 2017-09-21 RX ORDER — SODIUM CHLORIDE 9 MG/ML
INJECTION, SOLUTION INTRAVENOUS CONTINUOUS
Status: DISCONTINUED | OUTPATIENT
Start: 2017-09-21 | End: 2017-09-21 | Stop reason: HOSPADM

## 2017-09-21 RX ADMIN — PROPOFOL 125 MCG/KG/MIN: 10 INJECTION, EMULSION INTRAVENOUS at 12:09

## 2017-09-21 RX ADMIN — LIDOCAINE HYDROCHLORIDE 50 MG: 20 INJECTION, SOLUTION INTRAVENOUS at 12:09

## 2017-09-21 RX ADMIN — SODIUM CHLORIDE: 0.9 INJECTION, SOLUTION INTRAVENOUS at 10:09

## 2017-09-21 RX ADMIN — PROPOFOL 30 MG: 10 INJECTION, EMULSION INTRAVENOUS at 12:09

## 2017-09-21 RX ADMIN — PHENYLEPHRINE HYDROCHLORIDE 100 MCG: 10 INJECTION INTRAVENOUS at 01:09

## 2017-09-21 NOTE — PLAN OF CARE
Discharge criteria met. VSS. Pt denies pain. Pt tolerated PO fluids without any nausea or vomiting. Discharge instructions explained and reviewed with patient and friend. Copy given to friend. Dr. Hale came to bedside to talk to patient and friend. IV removed, catheter intact. Pt ready for discharge.

## 2017-09-21 NOTE — H&P
History & Physical - Short Stay  Gastroenterology      SUBJECTIVE:     Procedure: Colonoscopy    Chief Complaint/Indication for Procedure: Previous Polyps    History of Present Illness:  Patient is a 76 y.o. female presents with history of colon polyp S/P EMR here for surveillance.     Colonoscopy 5/23/2017    - Diverticulosis in the sigmoid colon.                        - One 10 mm polyp in the ascending colon, removed                         with a hot snare. Resected and retrieved.                        - One 10 mm polyp in the ascending colon, removed                         with mucosal resection. Resected and retrieved.                        - One 40 mm polyp in the ascending colon, removed                         with mucosal resection (Piecemeal). Resected and                         retrieved. Clips were placed.                        - Mucosal resection was performed. Resection and                         retrieval were complete.                        - Mucosal resection was performed. Resection and                         retrieval were complete.        Facility-Administered Medications Prior to Admission   Medication    denosumab (PROLIA) injection 60 mg     PTA Medications   Medication Sig    amlodipine (NORVASC) 5 MG tablet TAKE 1 TABLET(5 MG) BY MOUTH EVERY DAY    aspirin (ECOTRIN) 81 MG EC tablet Take 81 mg by mouth once daily.      calcium-vitamin D3 500 mg(1,250mg) -200 unit per tablet Take 1 tablet by mouth once daily.    ferrous sulfate 325 mg (65 mg iron) Tab tablet TAKE 1 TABLET(325 MG) BY MOUTH DAILY WITH BREAKFAST    folic acid (FOLVITE) 1 MG tablet TAKE 1 TABLET BY MOUTH EVERY DAY    methotrexate 2.5 MG Tab TAKE 4 TABLETS BY MOUTH EVERY 7 DAYS    metoprolol succinate (TOPROL-XL) 50 MG 24 hr tablet Take 1 tablet (50 mg total) by mouth once daily.    potassium chloride (KLOR-CON) 10 MEQ TbSR Take 1 tablet (10 mEq total) by mouth once daily.    predniSONE (DELTASONE) 5 MG tablet  TAKE 2 TABLETS BY MOUTH EVERY OTHER DAY.    sitagliptan-metformin (JANUMET)  mg per tablet Take 1 tablet by mouth 2 (two) times daily.    bismuth subsalicylate (PEPTO BISMOL) 262 mg/15 mL suspension Take 15 mLs by mouth every 6 (six) hours as needed.    BLOOD SUGAR DIAGNOSTIC (ONE TOUCH ULTRA TEST MISC) * * * As directed.  test sugars 2 times daily    MINERAL OIL/PETROLATUM,WHITE (SOOTHE NIGHT TIME LUBRICANT OPHT) Apply to eye.    multivitamin capsule Take 1 capsule by mouth once daily.      ONETOUCH ULTRA TEST Strp USE TO TEST BLOOD SUGAR THREE TIMES DAILY    ONETOUCH ULTRA TEST Strp TEST BLOOD GLUCOSE TWICE DAILY AS DIRECTED       Review of patient's allergies indicates:   Allergen Reactions    Erythromycin Shortness Of Breath     All mycins per patient  -affects myasthenia gravis    Sulfa (sulfonamide antibiotics)         Past Medical History:   Diagnosis Date    Anemia     Arthritis of hand 2013    Cataract     Colon polyps     Diabetes mellitus     Diabetic retinopathy     Encounter for blood transfusion     GERD (gastroesophageal reflux disease)     HTN (hypertension) 2013    Hypertension     Myasthenia gravis     Osteoporosis     Type II or unspecified type diabetes mellitus without mention of complication, uncontrolled 2013     Past Surgical History:   Procedure Laterality Date    CATARACT EXTRACTION       SECTION  1964    COLONOSCOPY N/A 2017    Procedure: COLONOSCOPY;  Surgeon: Greg Salinas MD;  Location: [address]);  Service: Endoscopy;  Laterality: N/A;  hx of Myasthenia Gravis    COLONOSCOPY N/A 2017    Procedure: COLONOSCOPY/EMR;  Surgeon: Addy Hale MD;  Location: [address]);  Service: Endoscopy;  Laterality: N/A;  EMR or large ascending colon polyp    ESOPHAGOGASTRODUODENOSCOPY      HYSTERECTOMY      s/p avastin   12    right eye    s/p avastin ou  12    s/p focal laser  12    left eye     thalmas gland removed       Family History   Problem Relation Age of Onset    Diabetes Mother     Celiac disease Neg Hx     Colon polyps Neg Hx     Colon cancer Neg Hx     Esophageal cancer Neg Hx     Inflammatory bowel disease Neg Hx     Irritable bowel syndrome Neg Hx     Stomach cancer Neg Hx      Social History   Substance Use Topics    Smoking status: Former Smoker     Types: Cigarettes     Quit date: 1976    Smokeless tobacco: Never Used    Alcohol use No       Review of Systems:  Constitutional: no fever or chills  Respiratory: no cough or shortness of breath  Cardiovascular: no chest pain or palpitations  Gastrointestinal: no nausea or vomiting, no abdominal pain or change in bowel habits    OBJECTIVE:     Vital Signs (Most Recent)  Temp: 97.7 °F (36.5 °C) (09/21/17 1035)  Pulse: 100 (09/21/17 1035)  Resp: 16 (09/21/17 1035)  BP: 134/69 (09/21/17 1035)  SpO2: 100 % (09/21/17 1035)    Physical Exam:  General: well developed, well nourished  Lungs:  clear to auscultation bilaterally and normal respiratory effort  Heart: regular rate, S1, S2 normal  Abdomen: soft, non-tender non-distented; bowel sounds normal; no masses,  no organomegaly    Laboratory  CBC: No results for input(s): WBC, RBC, HGB, HCT, PLT, MCV, MCH, MCHC in the last 168 hours.  CMP: No results for input(s): GLU, CALCIUM, ALBUMIN, PROT, NA, K, CO2, CL, BUN, CREATININE, ALKPHOS, ALT, AST, BILITOT in the last 168 hours.  Coagulation: No results for input(s): LABPROT, INR, APTT in the last 168 hours.      Diagnostic Results:      ASSESSMENT/PLAN:     Colon polyp S/P EMR    Plan: Colonoscopy    Anesthesia Plan: MAC    ASA Grade: ASA 2 - Patient with mild systemic disease with no functional limitations      The impression and plan was discussed in detail with the patient and family. All questions have been answered and the patient voices understanding of our plan at this point. The risk of the procedure was discussed in detail which  includes but not limited to bleeding, infection, perforation in some cases requiring surgery with its spectrum of complications.

## 2017-09-21 NOTE — PATIENT INSTRUCTIONS
Discharge Summary/Instructions after an Endoscopic Procedure  Patient Name: Teresita Tolbert  Patient MRN: [id number]  Patient YOB: 1940  Thursday, September 21, 2017  Addy Hale MD  RESTRICTIONS:  During your procedure today, you received medications for sedation.  These   medications may affect your judgment, balance and coordination.  Therefore,   for 24 hours, you have the following restrictions:   - DO NOT drive a car, operate machinery, make legal/financial decisions,   sign important papers or drink alcohol.    ACTIVITY:  The following day: return to full activity including work, except no heavy   lifting, straining or running for 3 days if polyps were removed.  DIET:  Eat and drink normally unless instructed otherwise.  TREATMENT FOR COMMON SIDE EFFECTS:  - Mild abdominal pain, belching, bloating or excessive gas: rest, eat   lightly and use a heating pad.  - Sore Throat: treat with throat lozenges and/or gargle with warm salt   water.  SYMPTOMS TO WATCH FOR AND REPORT TO YOUR PHYSICIAN:  1. Abdominal pain or bloating, other than gas cramps.  2. Chest pain.  3. Back pain.  4. Chills or fever occurring within 24 hours after the procedure.  5. Rectal bleeding, which would show as bright red, maroon, or black stools.   (A tablespoon of blood from the rectum is not serious, especially if   hemorrhoids are present.)  6. Vomiting.  7. Weakness or dizziness.  8. Because air was used during the procedure, expelling large amounts of air   from your rectum or belching is normal.  9. If a bowel prep was taken, you may not have a bowel movement for 1-3   days.  This is normal.  GO DIRECTLY TO THE EMERGENCY ROOM IF YOU HAVE ANY OF THE FOLLOWING:   Difficulty breathing   Chills and/or fever over 101 F   Persistent vomiting and/or vomiting blood   Severe abdominal pain   Severe chest pain   Black, tarry stools   Bleeding- more than one tablespoon  Your doctor recommends these additional instructions:  If any  biopsies were taken, your doctors clinic will call you in 1 to 2   weeks with any results.  You are being discharged to home.   We are waiting for your pathology results.   Your physician has recommended a repeat colonoscopy in one year for   surveillance based on pathology results.  For questions, problems or results please call your physician - Addy Hale MD at Work:  [phone].  [facility], EMERGENCY ROOM PHONE NUMBER: [phone]  IF A COMPLICATION OR EMERGENCY SITUATION ARISES AND YOU ARE UNABLE TO REACH   YOUR PHYSICIAN - GO DIRECTLY TO THE EMERGENCY ROOM.  Addy Hale MD  9/21/2017 1:14:41 PM  This report has been verified and signed electronically.

## 2017-09-21 NOTE — DISCHARGE SUMMARY
Discharge Summary/Instructions after an Endoscopic Procedure    Patient Name: Teresita Tolbert  Patient MRN: [id number]  Patient YOB: 1940    Thursday, September 21, 2017  Addy Hale MD    RESTRICTIONS:  During your procedure today, you received medications for sedation.  These medications may affect your judgment, balance and coordination.  Therefore, for 24 hours, you have the following restrictions:     - DO NOT drive a car, operate machinery, make legal/financial decisions, sign important papers or drink alcohol.      ACTIVITY:  The following day: return to full activity including work, except no heavy lifting, straining or running for 3 days if polyps were removed.    DIET:  Eat and drink normally unless instructed otherwise.    TREATMENT FOR COMMON SIDE EFFECTS:  - Mild abdominal pain, belching, bloating or excessive gas: rest, eat lightly and use a heating pad.  - Sore Throat: treat with throat lozenges and/or gargle with warm salt water.    SYMPTOMS TO WATCH FOR AND REPORT TO YOUR PHYSICIAN:  1. Abdominal pain or bloating, other than gas cramps.  2. Chest pain.  3. Back pain.  4. Chills or fever occurring within 24 hours after the procedure.  5. Rectal bleeding, which would show as bright red, maroon, or black stools. (A tablespoon of blood from the rectum is not serious, especially if hemorrhoids are present.)  6. Vomiting.  7. Weakness or dizziness.  8. Because air was used during the procedure, expelling large amounts of air from your rectum or belching is normal.  9. If a bowel prep was taken, you may not have a bowel movement for 1-3 days.  This is normal.    GO DIRECTLY TO THE EMERGENCY ROOM IF YOU HAVE ANY OF THE FOLLOWING:     Difficulty breathing   Chills and/or fever over 101 F   Persistent vomiting and/or vomiting blood   Severe abdominal pain   Severe chest pain   Black, tarry stools   Bleeding- more than one tablespoon    Your doctor recommends these additional instructions:  If any  biopsies were taken, your doctors clinic will call you in 1 to 2 weeks with any results.    You are being discharged to home.   We are waiting for your pathology results.   Your physician has recommended a repeat colonoscopy in one year for surveillance based on pathology results.    For questions, problems or results please call your physician - Addy Hale MD at Work:  [phone].    [facility], EMERGENCY ROOM PHONE NUMBER: [phone]    IF A COMPLICATION OR EMERGENCY SITUATION ARISES AND YOU ARE UNABLE TO REACH YOUR PHYSICIAN - GO DIRECTLY TO THE EMERGENCY ROOM.

## 2017-09-21 NOTE — ANESTHESIA PREPROCEDURE EVALUATION
09/21/2017  Teresita Tolbert is a 76 y.o., female.    Pre-op Assessment    I have reviewed the Patient Summary Reports.     I have reviewed the Nursing Notes.   I have reviewed the Medications.   Steroids Taken In Past Year: Prednisone    Review of Systems  Anesthesia Hx:  No problems with previous Anesthesia  History of prior surgery of interest to airway management or planning: Previous anesthesia: General   Social:  Former Smoker, No Alcohol Use    Hematology/Oncology:  Hematology Normal   Oncology Normal     EENT/Dental:EENT/Dental Normal   Cardiovascular:   Exercise tolerance: poor Hypertension, well controlled    Pulmonary:  Pulmonary Normal    Renal/:  Renal/ Normal     Hepatic/GI:   GERD, well controlled    Musculoskeletal:  Musculoskeletal Normal    Neurological:   Neuromuscular Disease,  Neuromuscular Disease, Myasthenia Gravis, thymoma associated   Endocrine:   Diabetes, well controlled, type 2    Dermatological:  Skin Normal    Psych:  Psychiatric Normal           Physical Exam  General:  Well nourished    Airway/Jaw/Neck:  Airway Findings: Mouth Opening: Normal Tongue: Normal  General Airway Assessment: Adult  Mallampati: II  TM Distance: Normal, at least 6 cm  Jaw/Neck Findings:  Neck ROM: Normal ROM      Dental:  Dental Findings: In tact    Chest/Lungs:  Chest/Lungs Clear    Heart/Vascular:  Heart Findings: Normal Heart murmur: negative Vascular Findings: Normal    Abdomen:  Abdomen Findings: Normal    Musculoskeletal:  Musculoskeletal Findings: Normal   Skin:  Skin Findings: Normal    Mental Status:  Mental Status Findings:  Cooperative, Alert and Oriented         Anesthesia Plan  Type of Anesthesia, risks & benefits discussed:  Anesthesia Type:  general, MAC  Patient's Preference: GA  Intra-op Monitoring Plan: standard ASA monitors  Intra-op Monitoring Plan Comments:   Post Op Pain Control  Plan: IV/PO Opiods PRN  Post Op Pain Control Plan Comments:   Induction:   IV  Beta Blocker:  Patient is on a Beta-Blocker and has received one dose within the past 24 hours (No further documentation required).       Informed Consent: Patient understands risks and agrees with Anesthesia plan.  Questions answered. Anesthesia consent signed with patient.  ASA Score: 3     Day of Surgery Review of History & Physical:    H&P update referred to the provider.         Ready For Surgery From Anesthesia Perspective.

## 2017-09-21 NOTE — TRANSFER OF CARE
Anesthesia Transfer of Care Note    Patient: Teresita Tolbert    Procedure(s) Performed: Procedure(s) (LRB):  COLONOSCOPY (N/A)    Patient location: [facility]    Anesthesia Type: general    Transport from OR: Transported from OR on 6-10 L/min O2 by face mask with adequate spontaneous ventilation    Post pain: adequate analgesia    Post assessment: no apparent anesthetic complications and tolerated procedure well    Post vital signs: stable    Level of consciousness: awake and sedated    Nausea/Vomiting: no nausea/vomiting    Complications: none    Transfer of care protocol was followed      Last vitals:   Visit Vitals  /69 (Patient Position: Lying)   Pulse 100   Temp 36.5 °C (97.7 °F) (Temporal)   Resp 16   Ht 5' (1.524 m)   Wt 59 kg (130 lb)   SpO2 100%   Breastfeeding? No   BMI 25.39 kg/m²

## 2017-09-21 NOTE — ANESTHESIA POSTPROCEDURE EVALUATION
Anesthesia Post Evaluation    Patient: Teresita Tolbert    Procedure(s) Performed: Procedure(s) (LRB):  COLONOSCOPY (N/A)    Final Anesthesia Type: general  Patient location during evaluation: PACU  Patient participation: Yes- Able to Participate  Level of consciousness: awake and alert and oriented  Post-procedure vital signs: reviewed and stable  Pain management: adequate  Airway patency: patent  PONV status at discharge: No PONV  Anesthetic complications: no      Cardiovascular status: blood pressure returned to baseline  Respiratory status: unassisted and room air  Hydration status: euvolemic  Follow-up not needed.        Visit Vitals  /62 (BP Location: Left arm, Patient Position: Lying)   Pulse 86   Temp 36.7 °C (98.1 °F) (Temporal)   Resp 16   Ht 5' (1.524 m)   Wt 59 kg (130 lb)   SpO2 100%   Breastfeeding? No   BMI 25.39 kg/m²       Pain/Kassie Score: Pain Assessment Performed: Yes (9/21/2017  1:37 PM)  Presence of Pain: denies (9/21/2017  1:37 PM)  Kassie Score: 9 (9/21/2017  1:37 PM)

## 2017-09-22 ENCOUNTER — PATIENT MESSAGE (OUTPATIENT)
Dept: ENDOCRINOLOGY | Facility: CLINIC | Age: 77
End: 2017-09-22

## 2017-09-27 ENCOUNTER — TELEPHONE (OUTPATIENT)
Dept: GASTROENTEROLOGY | Facility: CLINIC | Age: 77
End: 2017-09-27

## 2017-09-27 NOTE — TELEPHONE ENCOUNTER
----- Message from Addy Hale MD sent at 9/27/2017  1:05 PM CDT -----  Please let the patient know the EMR site biopsies did not showed adenoma. The small polyp removed was adenoma. Need colonoscopy in 1 year.

## 2017-10-02 DIAGNOSIS — E11.9 TYPE 2 DIABETES MELLITUS WITHOUT COMPLICATION: ICD-10-CM

## 2017-10-04 ENCOUNTER — TELEPHONE (OUTPATIENT)
Dept: INTERNAL MEDICINE | Facility: CLINIC | Age: 77
End: 2017-10-04

## 2017-10-04 NOTE — TELEPHONE ENCOUNTER
----- Message from Terell [place] sent at 10/4/2017  1:25 PM CDT -----  Contact: self/ [phone] home  Type: Rx    Name of medication(s): BLOOD SUGAR DIAGNOSTIC (ONE TOUCH ULTRA TEST MISC)    Is this a refill? New rx? refill    Who prescribed medication? Dr. Barrera    Pharmacy Name, Phone, & Location: [facility] on file    Comments: Pt would like to request a refill on the supplies above.  The pt is down to her last strip and would like to request that they be sent in today, if possible.  Please call and advise.    Thank you

## 2017-10-04 NOTE — TELEPHONE ENCOUNTER
Type: Rx     Name of medication(s): BLOOD SUGAR DIAGNOSTIC (ONE TOUCH ULTRA TEST MISC)     Is this a refill? New rx? refill     Who prescribed medication? Dr. Barrera     Pharmacy Name, Phone, & Location: [facility] on file     Comments: Pt would like to request a refill on the supplies above.  The pt is down to her last strip and would like to request that they be sent in today, if possible.  Please call and advise.     Thank you

## 2017-10-10 ENCOUNTER — PROCEDURE VISIT (OUTPATIENT)
Dept: OPHTHALMOLOGY | Facility: CLINIC | Age: 77
End: 2017-10-10
Payer: MEDICARE

## 2017-10-10 VITALS — DIASTOLIC BLOOD PRESSURE: 73 MMHG | HEART RATE: 80 BPM | SYSTOLIC BLOOD PRESSURE: 149 MMHG

## 2017-10-10 DIAGNOSIS — H35.373 EPIRETINAL MEMBRANE (ERM) OF BOTH EYES: ICD-10-CM

## 2017-10-10 DIAGNOSIS — E11.3513 CONTROLLED TYPE 2 DIABETES MELLITUS WITH BOTH EYES AFFECTED BY PROLIFERATIVE RETINOPATHY AND MACULAR EDEMA, WITHOUT LONG-TERM CURRENT USE OF INSULIN: Primary | ICD-10-CM

## 2017-10-10 DIAGNOSIS — H43.813 POSTERIOR VITREOUS DETACHMENT OF BOTH EYES: ICD-10-CM

## 2017-10-10 PROCEDURE — 67028 INJECTION EYE DRUG: CPT | Mod: PBBFAC,LT | Performed by: OPHTHALMOLOGY

## 2017-10-10 PROCEDURE — 92014 COMPRE OPH EXAM EST PT 1/>: CPT | Mod: 25,S$PBB,, | Performed by: OPHTHALMOLOGY

## 2017-10-10 PROCEDURE — 92134 CPTRZ OPH DX IMG PST SGM RTA: CPT | Mod: PBBFAC | Performed by: OPHTHALMOLOGY

## 2017-10-10 PROCEDURE — 67028 INJECTION EYE DRUG: CPT | Mod: S$PBB,LT,, | Performed by: OPHTHALMOLOGY

## 2017-10-10 PROCEDURE — C9257 BEVACIZUMAB INJECTION: HCPCS | Mod: PBBFAC | Performed by: OPHTHALMOLOGY

## 2017-10-10 RX ADMIN — BEVACIZUMAB 1.25 MG: 100 INJECTION, SOLUTION INTRAVENOUS at 11:10

## 2017-10-10 NOTE — PROGRESS NOTES
HPI     9 wk / OCT / Avastin  DLS - 08/08/2017 Dr. Olsen                Pt sts no changes with VA since last exam denies pain   (-)Flashes (-)Floaters  (-)Photophobia  (-)Glare  Gtts : Systane PRN occasional itchy eyes     HPI     6 wk / OCT / Avastin  DLS - 04/10/2017 Dr. Olsen                Pt sts no changes with VA since last exam  (-)Flashes (-)Floaters  (-)Photophobia  (-)Glare  Gtts : Systane PRN occasional itchy eyes       OCT - DME OU - good focal OU  DME Stable, OD DME improved OS    FA shows good perfusion time OU, multiple late leakage points in macula OU, staining of peripheral laser scars OU, mild late leakage from nerve OS      A/P    1-PDR OS>OD:   - S/p PRP OD (06/16/15)   - S/p PRP OS (07/07/15)   - Last HbA1c was 6.4 on 4/8/15   - Emphasized the importance of tight glucose control    2-DME OU   - s/p focal OU   -  Increase in cystoid fluid OS  S/p resumed avastin OS x 4    Repeat Avastin OS today    Will continue extension OS - will likely need maintenance    3-PCIOL OU    4-Ectropion OU:  Saw Mary for repair  Incomplete closer with some moderate inf PEEs         10 weeks OCT      Risks, benefits, and alternatives to treatment discussed in detail with the patient.  The patient voiced understanding and wished to proceed with the procedure    Injection Procedure Note:  Diagnosis: DME OS    Topical Proparacaine and Betadine.  Inject Avastin OS at 6:00 @ 3.5-4mm posterior to limbus  Post Operative Dx: Same  Complications: None  Follow up as above.

## 2017-10-10 NOTE — PATIENT INSTRUCTIONS

## 2017-10-11 ENCOUNTER — TELEPHONE (OUTPATIENT)
Dept: INTERNAL MEDICINE | Facility: CLINIC | Age: 77
End: 2017-10-11

## 2017-10-11 DIAGNOSIS — Z90.89 MYASTHENIA GRAVIS STATUS POST THYMECTOMY: ICD-10-CM

## 2017-10-11 DIAGNOSIS — G70.00 MYASTHENIA GRAVIS STATUS POST THYMECTOMY: ICD-10-CM

## 2017-10-11 DIAGNOSIS — M05.79 RHEUMATOID ARTHRITIS INVOLVING MULTIPLE SITES WITH POSITIVE RHEUMATOID FACTOR: ICD-10-CM

## 2017-10-11 RX ORDER — METHOTREXATE 2.5 MG/1
TABLET ORAL
Qty: 20 TABLET | Refills: 2 | Status: SHIPPED | OUTPATIENT
Start: 2017-10-11 | End: 2018-02-10 | Stop reason: SDUPTHER

## 2017-10-11 RX ORDER — PREDNISONE 5 MG/1
TABLET ORAL
Qty: 90 TABLET | Refills: 0 | Status: SHIPPED | OUTPATIENT
Start: 2017-10-11 | End: 2018-01-08 | Stop reason: SDUPTHER

## 2017-10-11 NOTE — TELEPHONE ENCOUNTER
Spoke w pharmacy; states that they will send over an CMN form for pt to receive diabetic supplies. Informed pt. Pt verbalized understanding.

## 2017-10-11 NOTE — TELEPHONE ENCOUNTER
----- Message from Destinee Christie sent at 10/11/2017  2:47 PM CDT -----  Contact: Mobile: [phone]   Have you completed her forms and sent to Medicare?

## 2017-10-16 ENCOUNTER — PATIENT MESSAGE (OUTPATIENT)
Dept: INTERNAL MEDICINE | Facility: CLINIC | Age: 77
End: 2017-10-16

## 2017-10-17 ENCOUNTER — TELEPHONE (OUTPATIENT)
Dept: INTERNAL MEDICINE | Facility: CLINIC | Age: 77
End: 2017-10-17

## 2017-10-17 NOTE — TELEPHONE ENCOUNTER
Does the sone know that? Can we tell him. Should it have arrived by now and if so maybe there was a problem with it.

## 2017-10-19 RX ORDER — METOPROLOL SUCCINATE 50 MG/1
50 TABLET, EXTENDED RELEASE ORAL DAILY
Qty: 30 TABLET | Refills: 11 | Status: SHIPPED | OUTPATIENT
Start: 2017-10-19 | End: 2017-10-20 | Stop reason: SDUPTHER

## 2017-10-19 NOTE — TELEPHONE ENCOUNTER
----- Message from Cornelia Murphy sent at 10/19/2017  2:28 PM CDT -----  Contact: Patient [phone]  Prescription Request:     Name of medication: metoprolol succinate (TOPROL-XL) 50 MG 24 hr tablet    Reason for request: Refill    Pharmacy: [facility] [address] [place] [address] AT [facility]    Please advise.    Thank You

## 2017-10-20 RX ORDER — METOPROLOL SUCCINATE 50 MG/1
50 TABLET, EXTENDED RELEASE ORAL DAILY
Qty: 30 TABLET | Refills: 0 | Status: SHIPPED | OUTPATIENT
Start: 2017-10-20 | End: 2017-11-20 | Stop reason: SDUPTHER

## 2017-10-20 NOTE — TELEPHONE ENCOUNTER
----- Message from Fernanda Garcia sent at 10/20/2017 10:23 AM CDT -----  Contact: pt [phone]  Pharmacy stated that medication is not approved by insurance Metoprolol succinate (TOPROL-XL) 50 MG 24 hr tablet and also Blood sugar diagnostic (ONETOUCH ULTRA TEST) Strp

## 2017-10-20 NOTE — TELEPHONE ENCOUNTER
Pharmacy states that pt has no more refills of metoprolol succinate. Pharmacy also states that CMN formed was received for blood sugar test strips, but they are waiting for Medicare to respond.

## 2017-10-20 NOTE — TELEPHONE ENCOUNTER
----- Message from Prerna Daniels sent at 10/20/2017  1:16 PM CDT -----  Contact: Pablo/ Son/ [phone]   Type: Rx    Name of medication(s): metoprolol succinate (TOPROL-XL) 50 MG 24 hr tablet and ONETOUCH ULTRA TEST Strp    Is this a refill? New rx? Refill     Who prescribed medication? Dr. Barrera     [facility] [address] [place] [address] AT [facility] [phone] (Phone)  [phone] (Fax)    Comments: pt son is calling to have a refill on the Rx above. Please call and advise     Thank you

## 2017-10-20 NOTE — TELEPHONE ENCOUNTER
Spoke to pt son Pablo. He is concerned about his mother's medication. His mother is out of metoprolol. They would like her medication sent to [facility] on [address] and [address].

## 2017-10-23 RX ORDER — AMLODIPINE BESYLATE 5 MG/1
TABLET ORAL
Qty: 30 TABLET | Refills: 0 | Status: SHIPPED | OUTPATIENT
Start: 2017-10-23 | End: 2017-11-21 | Stop reason: SDUPTHER

## 2017-10-27 NOTE — TELEPHONE ENCOUNTER
----- Message from Sharron Tomas sent at 10/27/2017  2:32 PM CDT -----  Contact: [phone] Teresita OSBORNE message was sent to me by mistake.  Patient is calling because she is out of test strips and she needing to find out the status of getting this handled.  Please call patient to advise.      Thanks, Isabelle Devlin

## 2017-10-31 ENCOUNTER — PATIENT MESSAGE (OUTPATIENT)
Dept: INTERNAL MEDICINE | Facility: CLINIC | Age: 77
End: 2017-10-31

## 2017-10-31 NOTE — TELEPHONE ENCOUNTER
Please call this number and see what it is and what they need? I have never had to call for a Medicare pt. Maybe this is new but they must need something. Do we have the wrong strips or supplies? Is this for a prior Auth. Get as much info as we can. I cannot imagine I will have to call Medicare to order supplies on every Diabetic Medicare patient that I have.

## 2017-11-01 NOTE — TELEPHONE ENCOUNTER
The number that  The pt provided is for prior authorizations. We called yesterday and they are going to fax a form to the office so that the prior authorization form can be filled out and faxed back to them.

## 2017-11-02 ENCOUNTER — TELEPHONE (OUTPATIENT)
Dept: INTERNAL MEDICINE | Facility: CLINIC | Age: 77
End: 2017-11-02

## 2017-11-02 NOTE — TELEPHONE ENCOUNTER
I have completed her Test Strip Form. Let's fax it ASAP and find out how to follow through to make sure it is approved or find out whatever is the next step.

## 2017-11-03 NOTE — TELEPHONE ENCOUNTER
[facility] was unable to help with brands. Called pt and informed her to call Medicare to see what strips they cover. I also called medicare myself and was told medicare doesn't cover any test strips and was given a number to call [phone]. Attempted to call this number and could not get through

## 2017-11-03 NOTE — TELEPHONE ENCOUNTER
I called [facility] and they state prior authorization was denied.  If pt wants these test strips it would require an appeal. This brand is a plan exclusion. Pt strips must be switched to a different brand, but they were unable to give me any alternatives for the strips. Will call [facility] to see if they can help with  Brands.

## 2017-11-14 DIAGNOSIS — M05.9 SEROPOSITIVE RHEUMATOID ARTHRITIS: ICD-10-CM

## 2017-11-15 RX ORDER — FOLIC ACID 1 MG/1
TABLET ORAL
Qty: 90 TABLET | Refills: 0 | Status: SHIPPED | OUTPATIENT
Start: 2017-11-15 | End: 2018-02-10 | Stop reason: SDUPTHER

## 2017-11-20 RX ORDER — METOPROLOL SUCCINATE 50 MG/1
TABLET, EXTENDED RELEASE ORAL
Qty: 30 TABLET | Refills: 0 | Status: SHIPPED | OUTPATIENT
Start: 2017-11-20 | End: 2017-12-21 | Stop reason: SDUPTHER

## 2017-11-21 RX ORDER — AMLODIPINE BESYLATE 5 MG/1
TABLET ORAL
Qty: 30 TABLET | Refills: 0 | Status: SHIPPED | OUTPATIENT
Start: 2017-11-21 | End: 2017-12-21 | Stop reason: SDUPTHER

## 2017-11-29 ENCOUNTER — OFFICE VISIT (OUTPATIENT)
Dept: NEUROLOGY | Facility: CLINIC | Age: 77
End: 2017-11-29
Payer: MEDICARE

## 2017-11-29 VITALS
HEART RATE: 71 BPM | WEIGHT: 133.81 LBS | SYSTOLIC BLOOD PRESSURE: 161 MMHG | HEIGHT: 61 IN | BODY MASS INDEX: 25.27 KG/M2 | DIASTOLIC BLOOD PRESSURE: 73 MMHG

## 2017-11-29 DIAGNOSIS — G70.00 MYASTHENIA GRAVIS, MUSK ANTIBODY POSITIVE: Primary | ICD-10-CM

## 2017-11-29 PROCEDURE — 99214 OFFICE O/P EST MOD 30 MIN: CPT | Mod: S$PBB,,, | Performed by: PSYCHIATRY & NEUROLOGY

## 2017-11-29 PROCEDURE — 99213 OFFICE O/P EST LOW 20 MIN: CPT | Mod: PBBFAC | Performed by: PSYCHIATRY & NEUROLOGY

## 2017-11-29 PROCEDURE — 99999 PR PBB SHADOW E&M-EST. PATIENT-LVL III: CPT | Mod: PBBFAC,,, | Performed by: PSYCHIATRY & NEUROLOGY

## 2017-11-29 NOTE — PROGRESS NOTES
Patient Name: Teresita Tolbert  MRN: [id number]    CC: MG    HPI: Teresita Tolbert is a 77 y.o. 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 She receives MTX from Dr Squires, who monitors her blood work.      She was diagnosed with MG in March of 2008, presumably had MuSK+ Ab on a lab draw, but the only thing that I can find in our system is a note from Dr Georges on 3/7/2008 which mentions this fact.   S/p thymectomy    +osteoporosis  +DM d/t steroids  +peripheral neuropathy  +RA    ROS:   Review of Systems   Constitutional: Negative for malaise/fatigue. Negative for weight loss.   HENT: Negative for hearing loss.   Eyes: Negative for blurred vision and double vision.   Respiratory: Negative for shortness of breath and stridor.   Cardiovascular: Negative for chest pain and palpitations.   Gastrointestinal: Negative for nausea, vomiting and constipation.   Genitourinary: Negative for frequency. Negative for urgency.   Musculoskeletal: Negative for joint pain. Negative for myalgias and falls.   Skin: Negative for rash.   Neurological: Negative for dizziness and tremors. Negative for focal weakness and seizures.   Endo/Heme/Allergies: Does not bruise/bleed easily.   Psychiatric/Behavioral: Negative for memory loss. Negative for depression and hallucinations. The patient is not nervous/anxious.      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  , Disp: , Rfl:     bismuth subsalicylate (PEPTO BISMOL) 262 mg/15 mL suspension, Take 15 mLs by mouth every 6 (six) hours as needed., Disp: , Rfl:     BLOOD SUGAR DIAGNOSTIC (ONE TOUCH ULTRA TEST MISC), * * * As directed.  test sugars 2 times daily, Disp: , Rfl:     blood sugar diagnostic (ONETOUCH ULTRA TEST) Strp, TEST BLOOD GLUCOSE TWICE DAILY AS DIRECTED, Disp: 150 strip, Rfl: 0    calcium-vitamin D3 500 mg(1,250mg) -200 unit per tablet, Take 1 tablet by mouth once daily., Disp: , Rfl:     ferrous sulfate 325 mg (65 mg iron) Tab tablet, TAKE 1 TABLET(325 MG) BY MOUTH DAILY WITH BREAKFAST, Disp: 30 tablet, Rfl: 0    folic acid (FOLVITE) 1 MG tablet, TAKE 1 TABLET BY MOUTH EVERY DAY, Disp: 90 tablet, Rfl: 0    methotrexate 2.5 MG Tab, TAKE 4 TABLETS BY MOUTH EVERY 7 DAYS, Disp: 20 tablet, Rfl: 2    metoprolol succinate (TOPROL-XL) 50 MG 24 hr tablet, TAKE 1 TABLET(50 MG) BY MOUTH EVERY DAY, Disp: 30 tablet, Rfl: 0    MINERAL OIL/PETROLATUM,WHITE (SOOTHE NIGHT TIME LUBRICANT OPHT), Apply to eye., Disp: , Rfl:     multivitamin capsule, Take 1 capsule by mouth once daily.  , Disp: , Rfl:     ONETOUCH ULTRA TEST Strp, USE TO TEST BLOOD SUGAR THREE TIMES DAILY, Disp: 300 strip, Rfl: 0    potassium chloride (KLOR-CON) 10 MEQ TbSR, Take 1 tablet (10 mEq total) by mouth once daily., Disp: 30 tablet, Rfl: 12    predniSONE (DELTASONE) 5 MG tablet, TAKE 2 TABLETS BY MOUTH EVERY OTHER DAY, Disp: 90 tablet, Rfl: 0    sitagliptan-metformin (JANUMET)  mg per tablet, Take 1 tablet by mouth 2 (two) times daily., Disp: 60 tablet, Rfl: 12    Current Facility-Administered Medications:     denosumab (PROLIA) injection 60 mg, 60 mg, Subcutaneous, Q6 Months, Gene Mosher II, MD, 60 mg at 01/16/17 1400    Allergies  Review of  "patient's allergies indicates:   Allergen Reactions    Erythromycin Shortness Of Breath     All mycins per patient  -affects myasthenia gravis    Sulfa (sulfonamide antibiotics)        Social History  Social History     Social History    Marital status:      Spouse name: N/A    Number of children: N/A    Years of education: N/A     Occupational History    Not on file.     Social History Main Topics    Smoking status: Former Smoker     Types: Cigarettes     Quit date: 1976    Smokeless tobacco: Never Used    Alcohol use No    Drug use: No    Sexual activity: Not on file     Other Topics Concern    Not on file     Social History Narrative    No narrative on file       Family History  Family History   Problem Relation Age of Onset    Diabetes Mother     Celiac disease Neg Hx     Colon polyps Neg Hx     Colon cancer Neg Hx     Esophageal cancer Neg Hx     Inflammatory bowel disease Neg Hx     Irritable bowel syndrome Neg Hx     Stomach cancer Neg Hx        Physical Exam  BP (!) 161/73   Pulse 71   Ht 5' 1" (1.549 m)   Wt 60.7 kg (133 lb 13.1 oz)   BMI 25.28 kg/m²     General appearance: Well-developed, well-groomed.     Neurologic Exam: The patient is awake, alert and oriented. Language is fluent. Recent and remote memory are normal. Attention span and concentration are normal. Fund of knowledge is appropriate.     Cranial nerves: pupils are round and reactive to light and accommodation. Visual fields are full to confrontation. Ocular motility is somewhat limited bilaterally with respect to abduction. She has bilateral ptosis, right greater than left. She has a fair amount of subcutaneous tissue below her eyebrows and above her upper lids, resulting in some downward lid displacement. She has bilateral buccal weakness and speaks with her tongue protruding from her mouth (chronic). Facial sensation is normal to pinprick and light touch.     Motor examination of all extremities demonstrates " normal bulk and tone in all four limbs. There are no atrophy or fasciculations. Strength is 5/5 in the upper and lower extremities bilaterally without pronator drift.     Sensory examination is normal to pinprick, vibration and proprioception in the upper and lower extremities bilaterally. Romberg is negative.    Deep tendon reflexes are 2+ and symmetric in the upper and lower extremities bilaterally. Toes are mute bilaterally.     Gait: she walks with a cane; stopped posture.     Coordination: Finger to nose and heel to shin testing is normal in both upper and lower extremities. Rapid alternating movements are normal in both upper and lower extremities.     General exam  Cardiovascular: regular rate and rhythm with no murmurs, rubs or gallops. There are no carotid or vertebral artery bruits. Pulses in both upper and lower extremities are symmetric. There is no peripheral edema.   Head and neck: no cervical lymphadenopathy      Lab and Test Results    WBC   Date Value Ref Range Status   08/08/2017 7.71 3.90 - 12.70 K/uL Final   05/15/2017 8.08 3.90 - 12.70 K/uL Final   03/24/2017 8.30 3.90 - 12.70 K/uL Final     Hemoglobin   Date Value Ref Range Status   08/08/2017 11.9 (L) 12.0 - 16.0 g/dL Final   05/15/2017 11.2 (L) 12.0 - 16.0 g/dL Final   03/24/2017 10.3 (L) 12.0 - 16.0 g/dL Final     Hematocrit   Date Value Ref Range Status   08/08/2017 39.7 37.0 - 48.5 % Final   05/15/2017 37.9 37.0 - 48.5 % Final   03/24/2017 33.9 (L) 37.0 - 48.5 % Final     Platelets   Date Value Ref Range Status   08/08/2017 284 150 - 350 K/uL Final   05/15/2017 286 150 - 350 K/uL Final   03/24/2017 293 150 - 350 K/uL Final     Glucose   Date Value Ref Range Status   08/08/2017 152 (H) 70 - 110 mg/dL Final   02/03/2017 91 70 - 110 mg/dL Final   06/22/2016 119 (H) 70 - 110 mg/dL Final     Sodium   Date Value Ref Range Status   08/08/2017 146 (H) 136 - 145 mmol/L Final   02/03/2017 144 136 - 145 mmol/L Final   06/22/2016 145 136 - 145 mmol/L  Final     Potassium   Date Value Ref Range Status   08/08/2017 4.2 3.5 - 5.1 mmol/L Final   02/03/2017 3.7 3.5 - 5.1 mmol/L Final   06/22/2016 4.3 3.5 - 5.1 mmol/L Final     Chloride   Date Value Ref Range Status   08/08/2017 108 95 - 110 mmol/L Final   02/03/2017 109 95 - 110 mmol/L Final   06/22/2016 108 95 - 110 mmol/L Final     CO2   Date Value Ref Range Status   08/08/2017 28 23 - 29 mmol/L Final   02/03/2017 26 23 - 29 mmol/L Final   06/22/2016 27 23 - 29 mmol/L Final     BUN, Bld   Date Value Ref Range Status   08/08/2017 10 8 - 23 mg/dL Final   02/03/2017 14 8 - 23 mg/dL Final   06/22/2016 16 8 - 23 mg/dL Final     Creatinine   Date Value Ref Range Status   08/08/2017 0.9 0.5 - 1.4 mg/dL Final   02/03/2017 0.9 0.5 - 1.4 mg/dL Final   06/22/2016 0.9 0.5 - 1.4 mg/dL Final     Calcium   Date Value Ref Range Status   08/08/2017 9.4 8.7 - 10.5 mg/dL Final   02/03/2017 8.9 8.7 - 10.5 mg/dL Final   06/22/2016 9.2 8.7 - 10.5 mg/dL Final     Phosphorus   Date Value Ref Range Status   10/05/2006 3.6 2.7 - 4.5 mg/dl Final     Alkaline Phosphatase   Date Value Ref Range Status   08/08/2017 68 55 - 135 U/L Final   02/03/2017 62 55 - 135 U/L Final   06/22/2016 72 55 - 135 U/L Final     ALT   Date Value Ref Range Status   08/08/2017 29 10 - 44 U/L Final   02/03/2017 20 10 - 44 U/L Final   06/22/2016 24 10 - 44 U/L Final     AST   Date Value Ref Range Status   08/08/2017 18 10 - 40 U/L Final   02/03/2017 19 10 - 40 U/L Final   06/22/2016 19 10 - 40 U/L Final         Assessment and Plan    Teresitanaveed Tolbert is a 77 y.o. 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 Will not change her therapy at this point, as she has achieved a reasonable balance between SE and minimizing symptoms.     Continue to see Dr Squires for MTX and RA  Continue to see Dr Barrera as primary care    RTC 6 mos.           Boom Morse MD, JULIAN, FAAN  Department of Neurology  [address] [place] [id number]

## 2017-11-29 NOTE — LETTER
November 29, 2017      Waqas Georges III, MD  [address] [facility] [address]           [facility]  [address]  [facility] [address]  Phone: [phone]  Fax: [phone]          Patient: Teresita Tolbert   MR Number: [id number]   YOB: 1940   Date of Visit: 11/29/2017       Dear Dr. Waqas Georges III:    Thank you for referring Teresita Tolbert to me for evaluation. Attached you will find relevant portions of my assessment and plan of care.    If you have questions, please do not hesitate to call me. I look forward to following Teresita Tolbert along with you.    Sincerely,    Deep Morse MD    Enclosure  CC:  No Recipients    If you would like to receive this communication electronically, please contact [email or web address] or [phone] to request more information on [employer] access.    For providers and/or their staff who would like to refer a patient to [facility], please contact us through our one-stop-shop provider referral line, [facility] [place], at [phone].    If you feel you have received this communication in error or would no longer like to receive these types of communications, please e-mail [email or web address]

## 2017-12-19 ENCOUNTER — PROCEDURE VISIT (OUTPATIENT)
Dept: OPHTHALMOLOGY | Facility: CLINIC | Age: 77
End: 2017-12-19
Payer: MEDICARE

## 2017-12-19 VITALS — HEART RATE: 76 BPM | DIASTOLIC BLOOD PRESSURE: 74 MMHG | SYSTOLIC BLOOD PRESSURE: 169 MMHG

## 2017-12-19 DIAGNOSIS — E11.3513 CONTROLLED TYPE 2 DIABETES MELLITUS WITH BOTH EYES AFFECTED BY PROLIFERATIVE RETINOPATHY AND MACULAR EDEMA, WITHOUT LONG-TERM CURRENT USE OF INSULIN: Primary | ICD-10-CM

## 2017-12-19 DIAGNOSIS — H35.372 EPIRETINAL MEMBRANE (ERM) OF LEFT EYE: ICD-10-CM

## 2017-12-19 DIAGNOSIS — H43.813 POSTERIOR VITREOUS DETACHMENT OF BOTH EYES: ICD-10-CM

## 2017-12-19 PROCEDURE — 92134 CPTRZ OPH DX IMG PST SGM RTA: CPT | Mod: PBBFAC | Performed by: OPHTHALMOLOGY

## 2017-12-19 PROCEDURE — 67028 INJECTION EYE DRUG: CPT | Mod: S$PBB,LT,, | Performed by: OPHTHALMOLOGY

## 2017-12-19 PROCEDURE — C9257 BEVACIZUMAB INJECTION: HCPCS | Mod: PBBFAC | Performed by: OPHTHALMOLOGY

## 2017-12-19 PROCEDURE — 67028 INJECTION EYE DRUG: CPT | Mod: PBBFAC,LT | Performed by: OPHTHALMOLOGY

## 2017-12-19 PROCEDURE — 92014 COMPRE OPH EXAM EST PT 1/>: CPT | Mod: 25,S$PBB,, | Performed by: OPHTHALMOLOGY

## 2017-12-19 RX ADMIN — BEVACIZUMAB 1.25 MG: 100 INJECTION, SOLUTION INTRAVENOUS at 11:12

## 2017-12-19 NOTE — PATIENT INSTRUCTIONS
POST INTRAVITREAL INJECTION PATIENT INSTRUCTIONS   Below are some guidelines for what to expect after your treatment and additional care instructions.   * you may experience mild discomfort in your eye after the treatment. Your eye usually feels better within 24 to 48 hours after the procedure.   * you have been given drops that numb the surface of your eye.   DO NOT rub or touch your eye, DO NOT wear contacts until numbness goes away.   * you may experience small spots that appear in your field of vision, these are usually caused by an air bubble or from the medication. It taked a few hours or days for these to go away.   * use of sunglasses will help reduce light sensitivity and glare.   * DO NOT swim or put sink water ( tap water ) or swin for at least 24 hours after the injection   * If you have a gritty, burning, irritating or stinging feeling in the injected eye. This may be a result of the antiseptic used. Use artifical tears or eye lubricant ( from over- the- counter from your local pharmacy ). If you have some at home already please check the expiration date, so not to use anything contaminated in your eye. A cool pack over your eye brow above the injected eye may also relieve discomfort.   Call us right away or go to the Emergency Department if you have a dramatic decrease in vision or extreme pain in the eye.   [facility] [phone]

## 2017-12-19 NOTE — PROGRESS NOTES
HPI     DL S 10/10/17   78 Y/O F here today for her 10 wk OCT review for   treatment of Type II DM w/ MO and ME after Avastin OS ck. stj      this am 12/19/17      HPI     9 wk / OCT / Avastin  DLS - 08/08/2017 Dr. Olsen                Pt sts no changes with VA since last exam denies pain   (-)Flashes (-)Floaters  (-)Photophobia  (-)Glare  Gtts : Systane PRN occasional itchy eyes     HPI     6 wk / OCT / Avastin  DLS - 04/10/2017 Dr. Olsen                Pt sts no changes with VA since last exam  (-)Flashes (-)Floaters  (-)Photophobia  (-)Glare  Gtts : Systane PRN occasional itchy eyes       OCT - DME OU - good focal OU  DME Stable, OD DME improved OS    FA shows good perfusion time OU, multiple late leakage points in macula OU, staining of peripheral laser scars OU, mild late leakage from nerve OS      A/P    1-PDR OS>OD:   - S/p PRP OD (06/16/15)   - S/p PRP OS (07/07/15)   - Last HbA1c was 6.4 on 4/8/15   - Emphasized the importance of tight glucose control    2-DME OU   - s/p focal OU   -  Increase in cystoid fluid OS  S/p resumed avastin OS x 5    Repeat Avastin OS today    Will continue extension OS - will likely need maintenance    3-PCIOL OU    4-Ectropion OU:  Saw Mary for repair  Incomplete closer with some moderate inf PEEs         3 months OCT      Risks, benefits, and alternatives to treatment discussed in detail with the patient.  The patient voiced understanding and wished to proceed with the procedure    Injection Procedure Note:  Diagnosis: DME OS    Topical Proparacaine and Betadine.  Inject Avastin OS at 6:00 @ 3.5-4mm posterior to limbus  Post Operative Dx: Same  Complications: None  Follow up as above.

## 2017-12-21 RX ORDER — METOPROLOL SUCCINATE 50 MG/1
TABLET, EXTENDED RELEASE ORAL
Qty: 30 TABLET | Refills: 0 | Status: SHIPPED | OUTPATIENT
Start: 2017-12-21 | End: 2018-01-19 | Stop reason: SDUPTHER

## 2017-12-21 RX ORDER — AMLODIPINE BESYLATE 5 MG/1
TABLET ORAL
Qty: 30 TABLET | Refills: 0 | Status: SHIPPED | OUTPATIENT
Start: 2017-12-21 | End: 2018-01-19 | Stop reason: SDUPTHER

## 2017-12-21 RX ORDER — METOPROLOL SUCCINATE 50 MG/1
TABLET, EXTENDED RELEASE ORAL
Qty: 30 TABLET | Refills: 0 | Status: SHIPPED | OUTPATIENT
Start: 2017-12-21 | End: 2018-01-19

## 2018-01-05 DIAGNOSIS — G70.00 MYASTHENIA GRAVIS STATUS POST THYMECTOMY: ICD-10-CM

## 2018-01-05 DIAGNOSIS — Z90.89 MYASTHENIA GRAVIS STATUS POST THYMECTOMY: ICD-10-CM

## 2018-01-05 RX ORDER — PREDNISONE 5 MG/1
TABLET ORAL
Qty: 90 TABLET | Refills: 0 | OUTPATIENT
Start: 2018-01-05

## 2018-01-08 ENCOUNTER — PATIENT MESSAGE (OUTPATIENT)
Dept: NEUROLOGY | Facility: CLINIC | Age: 78
End: 2018-01-08

## 2018-01-08 DIAGNOSIS — Z90.89 MYASTHENIA GRAVIS STATUS POST THYMECTOMY: ICD-10-CM

## 2018-01-08 DIAGNOSIS — G70.00 MYASTHENIA GRAVIS STATUS POST THYMECTOMY: ICD-10-CM

## 2018-01-08 RX ORDER — PREDNISONE 5 MG/1
TABLET ORAL
Qty: 90 TABLET | Refills: 3 | Status: SHIPPED | OUTPATIENT
Start: 2018-01-08 | End: 2019-01-01 | Stop reason: SDUPTHER

## 2018-01-12 ENCOUNTER — PATIENT MESSAGE (OUTPATIENT)
Dept: ENDOCRINOLOGY | Facility: CLINIC | Age: 78
End: 2018-01-12

## 2018-01-12 RX ORDER — SITAGLIPTIN AND METFORMIN HYDROCHLORIDE 500; 50 MG/1; MG/1
TABLET, FILM COATED ORAL
Qty: 60 TABLET | Refills: 0 | Status: CANCELLED | OUTPATIENT
Start: 2018-01-12

## 2018-01-19 ENCOUNTER — PATIENT MESSAGE (OUTPATIENT)
Dept: INTERNAL MEDICINE | Facility: CLINIC | Age: 78
End: 2018-01-19

## 2018-01-19 RX ORDER — METOPROLOL SUCCINATE 50 MG/1
TABLET, EXTENDED RELEASE ORAL
Qty: 30 TABLET | Refills: 6 | Status: SHIPPED | OUTPATIENT
Start: 2018-01-19 | End: 2018-09-14 | Stop reason: SDUPTHER

## 2018-01-19 RX ORDER — METOPROLOL SUCCINATE 50 MG/1
TABLET, EXTENDED RELEASE ORAL
Qty: 30 TABLET | Refills: 0 | Status: SHIPPED | OUTPATIENT
Start: 2018-01-19 | End: 2018-01-19 | Stop reason: SDUPTHER

## 2018-01-19 RX ORDER — AMLODIPINE BESYLATE 5 MG/1
TABLET ORAL
Qty: 30 TABLET | Refills: 6 | Status: SHIPPED | OUTPATIENT
Start: 2018-01-19 | End: 2018-09-14 | Stop reason: SDUPTHER

## 2018-01-19 RX ORDER — AMLODIPINE BESYLATE 5 MG/1
TABLET ORAL
Qty: 30 TABLET | Refills: 0 | Status: SHIPPED | OUTPATIENT
Start: 2018-01-19 | End: 2018-01-19 | Stop reason: SDUPTHER

## 2018-01-28 DIAGNOSIS — M05.79 RHEUMATOID ARTHRITIS INVOLVING MULTIPLE SITES WITH POSITIVE RHEUMATOID FACTOR: ICD-10-CM

## 2018-01-29 RX ORDER — METHOTREXATE 2.5 MG/1
TABLET ORAL
Qty: 20 TABLET | Refills: 0 | OUTPATIENT
Start: 2018-01-29

## 2018-01-30 ENCOUNTER — OFFICE VISIT (OUTPATIENT)
Dept: ENDOCRINOLOGY | Facility: CLINIC | Age: 78
End: 2018-01-30
Payer: MEDICARE

## 2018-01-30 VITALS
HEART RATE: 66 BPM | SYSTOLIC BLOOD PRESSURE: 142 MMHG | DIASTOLIC BLOOD PRESSURE: 72 MMHG | HEIGHT: 61 IN | WEIGHT: 136.25 LBS | BODY MASS INDEX: 25.72 KG/M2

## 2018-01-30 DIAGNOSIS — E11.65: Primary | ICD-10-CM

## 2018-01-30 DIAGNOSIS — E11.319: Primary | ICD-10-CM

## 2018-01-30 PROCEDURE — 99213 OFFICE O/P EST LOW 20 MIN: CPT | Mod: PBBFAC | Performed by: INTERNAL MEDICINE

## 2018-01-30 PROCEDURE — 99999 PR PBB SHADOW E&M-EST. PATIENT-LVL III: CPT | Mod: PBBFAC,,, | Performed by: INTERNAL MEDICINE

## 2018-01-30 PROCEDURE — 99214 OFFICE O/P EST MOD 30 MIN: CPT | Mod: S$PBB,,, | Performed by: INTERNAL MEDICINE

## 2018-01-30 PROCEDURE — 1126F AMNT PAIN NOTED NONE PRSNT: CPT | Mod: ,,, | Performed by: INTERNAL MEDICINE

## 2018-01-30 PROCEDURE — 1159F MED LIST DOCD IN RCRD: CPT | Mod: ,,, | Performed by: INTERNAL MEDICINE

## 2018-01-30 NOTE — PROGRESS NOTES
Subjective:      Patient ID: Teresita Tolbert is a 77 y.o. female.    Chief Complaint:  No chief complaint on file.      History of Present Illness  F/U osteoporosis and T2DM with DR.      Pt has had diabetes dating back to the mid 1980s. She had myasthenia   diagnosed in 1979, underwent a thymectomy in 1980 and has been on chronic   steroid administration since. She takes 10 mg prednisone every other day.    She is currently taking Janumet 50/500 bid. She stopped insulin in Nov. because of hypoglycemia.   Fasting BG all at goal.    Hemoglobin A1C   Date Value Ref Range Status   01/30/2018 7.2 (H) 4.0 - 5.6 % Final     Comment:     According to ADA guidelines, hemoglobin A1c <7.0% represents  optimal control in non-pregnant diabetic patients. Different  metrics may apply to specific patient populations.   Standards of Medical Care in Diabetes-2016.  For the purpose of screening for the presence of diabetes:  <5.7%     Consistent with the absence of diabetes  5.7-6.4%  Consistent with increasing risk for diabetes   (prediabetes)  >or=6.5%  Consistent with diabetes  Currently, no consensus exists for use of hemoglobin A1c  for diagnosis of diabetes for children.  This Hemoglobin A1c assay has significant interference with fetal   hemoglobin   (HbF). The results are invalid for patients with abnormal amounts of   HbF,   including those with known Hereditary Persistence   of Fetal Hemoglobin. Heterozygous hemoglobin variants (HbAS, HbAC,   HbAD, HbAE, HbA2) do not significantly interfere with this assay;   however, presence of multiple variants in a sample may impact the %   interference.     03/24/2017 7.1 (H) 4.5 - 6.2 % Final     Comment:     According to ADA guidelines, hemoglobin A1C <7.0% represents  optimal control in non-pregnant diabetic patients.  Different  metrics may apply to specific populations.   Standards of Medical Care in Diabetes - 2016.  For the purpose of screening for the presence of  diabetes:  <5.7%     Consistent with the absence of diabetes  5.7-6.4%  Consistent with increasing risk for diabetes   (prediabetes)  >or=6.5%  Consistent with diabetes  Currently no consensus exists for use of hemoglobin A1C  for diagnosis of diabetes for children.     08/25/2016 7.0 (H) 4.5 - 6.2 % Final     Comment:     According to ADA guidelines, hemoglobin A1C <7.0% represents  optimal control in non-pregnant diabetic patients.  Different  metrics may apply to specific populations.   Standards of Medical Care in Diabetes - 2016.  For the purpose of screening for the presence of diabetes:  <5.7%     Consistent with the absence of diabetes  5.7-6.4%  Consistent with increasing risk for diabetes   (prediabetes)  >or=6.5%  Consistent with diabetes  Currently no consensus exists for use of hemoglobin A1C  for diagnosis of diabetes for children.       Pt did not take fosamax.  Pt received Prolia once in 05/2014 however she did not like the injection.  No new falls or fractures.  Last BMD 12/2013.    Review of Systems   Constitutional: Negative for unexpected weight change.   Eyes: Negative for visual disturbance.   Respiratory: Negative for shortness of breath.    Cardiovascular: Negative for chest pain, palpitations and leg swelling.   Gastrointestinal: Negative for constipation and diarrhea.   Endocrine: Negative for polydipsia and polyuria.   Genitourinary: Negative for frequency.   Musculoskeletal: Positive for arthralgias.   Neurological: Negative for numbness.       Objective:   Physical Exam   Neck: No thyromegaly present.   Cardiovascular: Normal rate and regular rhythm.    Pulmonary/Chest: Effort normal and breath sounds normal.   Neurological: She is alert.   Skin: Skin is warm.   Psychiatric: She has a normal mood and affect.   Vitals reviewed.      Lab Review:   Hemoglobin A1C   Date Value Ref Range Status   01/30/2018 7.2 (H) 4.0 - 5.6 % Final     Comment:     According to ADA guidelines, hemoglobin A1c  <7.0% represents  optimal control in non-pregnant diabetic patients. Different  metrics may apply to specific patient populations.   Standards of Medical Care in Diabetes-2016.  For the purpose of screening for the presence of diabetes:  <5.7%     Consistent with the absence of diabetes  5.7-6.4%  Consistent with increasing risk for diabetes   (prediabetes)  >or=6.5%  Consistent with diabetes  Currently, no consensus exists for use of hemoglobin A1c  for diagnosis of diabetes for children.  This Hemoglobin A1c assay has significant interference with fetal   hemoglobin   (HbF). The results are invalid for patients with abnormal amounts of   HbF,   including those with known Hereditary Persistence   of Fetal Hemoglobin. Heterozygous hemoglobin variants (HbAS, HbAC,   HbAD, HbAE, HbA2) do not significantly interfere with this assay;   however, presence of multiple variants in a sample may impact the %   interference.     03/24/2017 7.1 (H) 4.5 - 6.2 % Final     Comment:     According to ADA guidelines, hemoglobin A1C <7.0% represents  optimal control in non-pregnant diabetic patients.  Different  metrics may apply to specific populations.   Standards of Medical Care in Diabetes - 2016.  For the purpose of screening for the presence of diabetes:  <5.7%     Consistent with the absence of diabetes  5.7-6.4%  Consistent with increasing risk for diabetes   (prediabetes)  >or=6.5%  Consistent with diabetes  Currently no consensus exists for use of hemoglobin A1C  for diagnosis of diabetes for children.     08/25/2016 7.0 (H) 4.5 - 6.2 % Final     Comment:     According to ADA guidelines, hemoglobin A1C <7.0% represents  optimal control in non-pregnant diabetic patients.  Different  metrics may apply to specific populations.   Standards of Medical Care in Diabetes - 2016.  For the purpose of screening for the presence of diabetes:  <5.7%     Consistent with the absence of diabetes  5.7-6.4%  Consistent with increasing risk  for diabetes   (prediabetes)  >or=6.5%  Consistent with diabetes  Currently no consensus exists for use of hemoglobin A1C  for diagnosis of diabetes for children.        Ref. Range 5/6/2015 14:35   Sodium Latest Range: 136-145 mmol/L 143   Potassium Latest Range: 3.5-5.1 mmol/L 4.4   Chloride Latest Range:  mmol/L 108   CO2 Latest Range: 23-29 mmol/L 25   Anion Gap Latest Range: 8-16 mmol/L 10   BUN, Bld Latest Range: 8-23 mg/dL 17   Creatinine Latest Range: 0.5-1.4 mg/dL 0.8   eGFR if non  Latest Range: >60 mL/min/1.73 m^2 >60.0   eGFR if  Latest Range: >60 mL/min/1.73 m^2 >60.0   Glucose Latest Range:  mg/dL 102   Calcium Latest Range: 8.7-10.5 mg/dL 9.5   Alkaline Phosphatase Latest Range:  U/L 76   Total Protein Latest Range: 6.0-8.4 g/dL 6.8   Albumin Latest Range: 3.5-5.2 g/dL 3.4 (L)   Total Bilirubin Latest Range: 0.1-1.0 mg/dL 1.2 (H)   AST Latest Range: 10-40 U/L 31   ALT Latest Range: 10-44 U/L 25   CRP Latest Range: 0.0-8.2 mg/L 0.7      Ref. Range 12/31/2013 15:15   Vit D, 25-Hydroxy Latest Range: 30-96 ng/mL 56     12/2013  BONE MINERAL DENSITY RESULTS:  Lumbar Spine: Lumbar bone mineral density L1-L4 is 0.891g/cm2, which is a t-score of -2.4. The z-score is 0.2.    Total Hip: The total hip bone mineral density is 0.670g/cm2. The t-score is -2.3, and the z-score is -1.1. Femoral neck BMD is 0.579g/cm2 and the t-score is -2.6.    COMPARISONS:  Date Location BMD T-score  03/17/11 L-spine 0.908 -2.2  Total Hip 0.658 -2.4   Assessment:     Plan:   IMPRESSION: Type 2 diabetes, controlled with DR  She also has longstanding Myasthenia gravis, longstanding steroid use, Osteoporosis.    Continue Janumet to BID      Prolia ordered.  She cannot swallow large pills so we will avoid fosamax.  Pt agreed to repeat BMD.  I provided information on prolia and reclast to family.  Will discuss BMD with son Pablo.

## 2018-02-01 ENCOUNTER — PATIENT MESSAGE (OUTPATIENT)
Dept: ENDOCRINOLOGY | Facility: CLINIC | Age: 78
End: 2018-02-01

## 2018-02-08 ENCOUNTER — INFUSION (OUTPATIENT)
Dept: INFECTIOUS DISEASES | Facility: HOSPITAL | Age: 78
End: 2018-02-08
Attending: INTERNAL MEDICINE
Payer: MEDICARE

## 2018-02-08 VITALS — HEIGHT: 61 IN | WEIGHT: 136.25 LBS | BODY MASS INDEX: 25.72 KG/M2

## 2018-02-08 DIAGNOSIS — M81.0 SENILE OSTEOPOROSIS: Primary | ICD-10-CM

## 2018-02-08 PROCEDURE — 96372 THER/PROPH/DIAG INJ SC/IM: CPT

## 2018-02-08 PROCEDURE — 63600175 PHARM REV CODE 636 W HCPCS: Mod: JG | Performed by: INTERNAL MEDICINE

## 2018-02-08 RX ADMIN — DENOSUMAB 60 MG: 60 INJECTION SUBCUTANEOUS at 09:02

## 2018-02-10 DIAGNOSIS — M05.79 RHEUMATOID ARTHRITIS INVOLVING MULTIPLE SITES WITH POSITIVE RHEUMATOID FACTOR: ICD-10-CM

## 2018-02-10 DIAGNOSIS — M05.9 SEROPOSITIVE RHEUMATOID ARTHRITIS: ICD-10-CM

## 2018-02-12 RX ORDER — METHOTREXATE 2.5 MG/1
TABLET ORAL
Qty: 20 TABLET | Refills: 2 | Status: SHIPPED | OUTPATIENT
Start: 2018-02-12 | End: 2018-02-14 | Stop reason: SDUPTHER

## 2018-02-12 RX ORDER — FOLIC ACID 1 MG/1
TABLET ORAL
Qty: 90 TABLET | Refills: 0 | Status: SHIPPED | OUTPATIENT
Start: 2018-02-12 | End: 2018-05-10 | Stop reason: SDUPTHER

## 2018-02-14 DIAGNOSIS — M05.79 RHEUMATOID ARTHRITIS INVOLVING MULTIPLE SITES WITH POSITIVE RHEUMATOID FACTOR: ICD-10-CM

## 2018-02-15 RX ORDER — METHOTREXATE 2.5 MG/1
10 TABLET ORAL
Qty: 20 TABLET | Refills: 2 | Status: SHIPPED | OUTPATIENT
Start: 2018-02-15 | End: 2018-05-07 | Stop reason: SDUPTHER

## 2018-03-20 ENCOUNTER — PROCEDURE VISIT (OUTPATIENT)
Dept: OPHTHALMOLOGY | Facility: CLINIC | Age: 78
End: 2018-03-20
Payer: MEDICARE

## 2018-03-20 VITALS — SYSTOLIC BLOOD PRESSURE: 161 MMHG | DIASTOLIC BLOOD PRESSURE: 75 MMHG | HEART RATE: 71 BPM

## 2018-03-20 DIAGNOSIS — H43.813 POSTERIOR VITREOUS DETACHMENT OF BOTH EYES: ICD-10-CM

## 2018-03-20 DIAGNOSIS — E11.3513 CONTROLLED TYPE 2 DIABETES MELLITUS WITH BOTH EYES AFFECTED BY PROLIFERATIVE RETINOPATHY AND MACULAR EDEMA, WITHOUT LONG-TERM CURRENT USE OF INSULIN: Primary | ICD-10-CM

## 2018-03-20 PROCEDURE — 92134 CPTRZ OPH DX IMG PST SGM RTA: CPT | Mod: PBBFAC | Performed by: OPHTHALMOLOGY

## 2018-03-20 PROCEDURE — 92014 COMPRE OPH EXAM EST PT 1/>: CPT | Mod: 25,S$PBB,, | Performed by: OPHTHALMOLOGY

## 2018-03-20 PROCEDURE — 67028 INJECTION EYE DRUG: CPT | Mod: 50,PBBFAC | Performed by: OPHTHALMOLOGY

## 2018-03-20 PROCEDURE — C9257 BEVACIZUMAB INJECTION: HCPCS | Mod: PBBFAC,JG | Performed by: OPHTHALMOLOGY

## 2018-03-20 PROCEDURE — 67028 INJECTION EYE DRUG: CPT | Mod: 50,S$PBB,, | Performed by: OPHTHALMOLOGY

## 2018-03-20 RX ADMIN — BEVACIZUMAB 1.25 MG: 100 INJECTION, SOLUTION INTRAVENOUS at 12:03

## 2018-03-20 NOTE — PROGRESS NOTES
HPI     3 mo / OCT  DLS-12/19/2017 Dr. Olsen    Pt sts no change in va since last visit. Denies pain   (-)Flashes (-)Floaters  (+)Photophobia  (-)Glare    LBS - 141 this am >?      OCT - DME OU - good focal OU  DME OD slight increase DME improved OS    Prior FA shows good perfusion time OU, multiple late leakage points in macula OU, staining of peripheral laser scars OU, mild late leakage from nerve OS      A/P    1-PDR OS>OD:   - S/p PRP OD (06/16/15)   - S/p PRP OS (07/07/15)   - Last HbA1c was 6.4 on 4/8/15   - Emphasized the importance of tight glucose control    2-DME OU   - s/p focal OU   -  Increase in cystoid fluid OS  S/p resumed avastin OS x 6  3/18 - increased DME OD, stable OS      Avastin OU today    3-PCIOL OU    4-Ectropion OU:  Saw Mary for repair  Incomplete closer with some moderate inf PEEs         3 months OCT      Risks, benefits, and alternatives to treatment discussed in detail with the patient.  The patient voiced understanding and wished to proceed with the procedure    Injection Procedure Note:  Diagnosis: DME OU    Topical Proparacaine and Betadine.  Inject Avastin OU at 6:00 @ 3.5-4mm posterior to limbus  Post Operative Dx: Same  Complications: None  Follow up as above.

## 2018-03-20 NOTE — PATIENT INSTRUCTIONS

## 2018-05-07 ENCOUNTER — OFFICE VISIT (OUTPATIENT)
Dept: RHEUMATOLOGY | Facility: CLINIC | Age: 78
End: 2018-05-07
Payer: MEDICARE

## 2018-05-07 VITALS
HEART RATE: 71 BPM | BODY MASS INDEX: 29.34 KG/M2 | WEIGHT: 136 LBS | SYSTOLIC BLOOD PRESSURE: 157 MMHG | DIASTOLIC BLOOD PRESSURE: 75 MMHG | HEIGHT: 57 IN

## 2018-05-07 DIAGNOSIS — Z79.899 HIGH RISK MEDICATION USE: Chronic | ICD-10-CM

## 2018-05-07 DIAGNOSIS — M81.0 OSTEOPOROSIS, POSTMENOPAUSAL: Chronic | ICD-10-CM

## 2018-05-07 DIAGNOSIS — M05.79 RHEUMATOID ARTHRITIS INVOLVING MULTIPLE SITES WITH POSITIVE RHEUMATOID FACTOR: Primary | ICD-10-CM

## 2018-05-07 PROCEDURE — 99214 OFFICE O/P EST MOD 30 MIN: CPT | Mod: S$PBB,,, | Performed by: INTERNAL MEDICINE

## 2018-05-07 PROCEDURE — 99999 PR PBB SHADOW E&M-EST. PATIENT-LVL IV: CPT | Mod: PBBFAC,,, | Performed by: INTERNAL MEDICINE

## 2018-05-07 PROCEDURE — 99214 OFFICE O/P EST MOD 30 MIN: CPT | Mod: PBBFAC | Performed by: INTERNAL MEDICINE

## 2018-05-07 RX ORDER — METHOTREXATE 2.5 MG/1
10 TABLET ORAL
Qty: 20 TABLET | Refills: 2 | Status: SHIPPED | OUTPATIENT
Start: 2018-05-07 | End: 2018-09-21 | Stop reason: SDUPTHER

## 2018-05-07 ASSESSMENT — DISEASE ACTIVITY SCORE (DAS28)
GLOBAL_HEALTH_SCORE: 45
TENDER_JOINTS_COUNT: 0
ESR_MM_PER_HR: 25
TOTAL_SCORE_CRP: 2.18
CRP_MG_PER_LITER: 4.2
SWOLLEN_JOINTS_COUNT: 0
TOTAL_SCORE_ESR: 2.88

## 2018-05-07 ASSESSMENT — ROUTINE ASSESSMENT OF PATIENT INDEX DATA (RAPID3)
PATIENT GLOBAL ASSESSMENT SCORE: 4.5
PAIN SCORE: 5
TOTAL RAPID3 SCORE: 4.5
MDHAQ FUNCTION SCORE: 1.2

## 2018-05-07 NOTE — PROGRESS NOTES
"Subjective:       Patient ID: Teresita Tolbert is a 77 y.o. female.    Chief Complaint: Disease Management    F/u RA, seropos on MTX 10 mg/week since May 2015  F/u osteoporosis with lumbar compressions   Prolia since 2016    Had prolia Feb 2018  PMH: myasthenia gravis on prednisone and previously azathioprine until started MTX for RA         Still takes MTX weekly for RA  Walks with cane  Hard to  from the floor  Not much pain in joints    Review of Systems   Constitutional: Negative for fever and unexpected weight change.   HENT: Positive for trouble swallowing.    Eyes: Negative for redness.   Respiratory: Positive for cough and shortness of breath.    Cardiovascular: Positive for chest pain.   Gastrointestinal: Positive for diarrhea. Negative for constipation.   Genitourinary: Negative for dysuria and genital sores.   Skin: Negative for rash.   Neurological: Negative for headaches.   Hematological: Does not bruise/bleed easily.         Objective:   BP (!) 157/75   Pulse 71   Ht 4' 9" (1.448 m)   Wt 61.7 kg (136 lb)   BMI 29.43 kg/m²      Physical Exam   Constitutional: She is well-developed, well-nourished, and in no distress.   HENT:   Mouth/Throat: Oropharynx is clear and moist.   Eyes: Conjunctivae are normal.   Cardiovascular: Normal rate and regular rhythm.    Murmur heard.  Systolic murmur   Pulmonary/Chest: She has no wheezes. She has no rales.   kyphosis   Lymphadenopathy:     She has no cervical adenopathy.   Skin: No rash noted.     Musculoskeletal:   Ulnar deviation digits 3-5, L>R hand  Thumb deformities ; see photo            Lab Results   Component Value Date    CRP 4.2 05/07/2018      Lab Results   Component Value Date    SEDRATE 25 (H) 05/07/2018       ARREDONDO 2.88  Assessment:       1. Rheumatoid arthritis involving multiple sites with positive rheumatoid factor    2. Osteoporosis, postmenopausal    3. 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 Lab today and Aug  2. DXA   3. RTC me in Sept 25 4  Rapid3 Question Responses and Scores 5/7/2018   MDHAQ Score 1.2   Psychologic Score 0   Pain Score 5   When you awakened in the morning OVER THE LAST WEEK, did you feel stiff? Yes   If Yes, please indicate the number of hours until you are as limber as you will be for the day 0.3   Fatigue Score 4.5   Global Health Score 4.5   RAPID3 Score 4.5

## 2018-05-10 DIAGNOSIS — M05.9 SEROPOSITIVE RHEUMATOID ARTHRITIS: ICD-10-CM

## 2018-05-10 RX ORDER — FOLIC ACID 1 MG/1
TABLET ORAL
Qty: 90 TABLET | Refills: 0 | Status: SHIPPED | OUTPATIENT
Start: 2018-05-10 | End: 2018-08-06 | Stop reason: SDUPTHER

## 2018-06-08 RX ORDER — POTASSIUM CHLORIDE 750 MG/1
TABLET, EXTENDED RELEASE ORAL
Qty: 30 TABLET | Refills: 0 | Status: SHIPPED | OUTPATIENT
Start: 2018-06-08 | End: 2018-07-07 | Stop reason: SDUPTHER

## 2018-06-19 ENCOUNTER — PROCEDURE VISIT (OUTPATIENT)
Dept: OPHTHALMOLOGY | Facility: CLINIC | Age: 78
End: 2018-06-19
Payer: MEDICARE

## 2018-06-19 VITALS — SYSTOLIC BLOOD PRESSURE: 180 MMHG | DIASTOLIC BLOOD PRESSURE: 80 MMHG | HEART RATE: 75 BPM

## 2018-06-19 DIAGNOSIS — H43.813 POSTERIOR VITREOUS DETACHMENT OF BOTH EYES: ICD-10-CM

## 2018-06-19 DIAGNOSIS — E11.3513 CONTROLLED TYPE 2 DIABETES MELLITUS WITH BOTH EYES AFFECTED BY PROLIFERATIVE RETINOPATHY AND MACULAR EDEMA, WITHOUT LONG-TERM CURRENT USE OF INSULIN: Primary | ICD-10-CM

## 2018-06-19 PROCEDURE — C9257 BEVACIZUMAB INJECTION: HCPCS | Mod: PBBFAC,JG | Performed by: OPHTHALMOLOGY

## 2018-06-19 PROCEDURE — 67028 INJECTION EYE DRUG: CPT | Mod: 50,S$PBB,, | Performed by: OPHTHALMOLOGY

## 2018-06-19 PROCEDURE — 67028 INJECTION EYE DRUG: CPT | Mod: 50,PBBFAC | Performed by: OPHTHALMOLOGY

## 2018-06-19 PROCEDURE — 92014 COMPRE OPH EXAM EST PT 1/>: CPT | Mod: 25,S$PBB,, | Performed by: OPHTHALMOLOGY

## 2018-06-19 RX ADMIN — Medication 1.25 MG: at 11:06

## 2018-06-19 RX ADMIN — BEVACIZUMAB 1.25 MG: 100 INJECTION, SOLUTION INTRAVENOUS at 11:06

## 2018-06-19 NOTE — PROGRESS NOTES
HPI     DLS-3/20/2018 Dr. Olsen    3 mons ck     Pt noticed that VA is stable ---denies eye pain, flashes or floaters    LBS - 190 this am       OCT - DME OU - good focal OU  DME OD slight increase DME improved OS    Prior FA shows good perfusion time OU, multiple late leakage points in macula OU, staining of peripheral laser scars OU, mild late leakage from nerve OS      A/P    1-PDR OS>OD:   - S/p PRP OD (06/16/15)   - S/p PRP OS (07/07/15)   - Last HbA1c was 6.4 on 4/8/15   - Emphasized the importance of tight glucose control    2-DME OU   - s/p focal OU   -  Increase in cystoid fluid OS  S/p resumed avastin OD x 1, OS x 7  3/18 - increased DME OD, stable OS  6/18 - q 3 month Avastin working OU      Avastin OU today    3-PCIOL OU    4-Ectropion OU:  Saw Mary for repair  Incomplete closer with some moderate inf PEEs         3 months OCT      Risks, benefits, and alternatives to treatment discussed in detail with the patient.  The patient voiced understanding and wished to proceed with the procedure    Injection Procedure Note:  Diagnosis: DME OU    Topical Proparacaine and Betadine.  Inject Avastin OU at 6:00 @ 3.5-4mm posterior to limbus  Post Operative Dx: Same  Complications: None  Follow up as above.

## 2018-06-19 NOTE — PATIENT INSTRUCTIONS
POST INTRAVITREAL INJECTION PATIENT INSTRUCTIONS   Below are some guidelines for what to expect after your treatment and additional care instructions.    You may experience mild discomfort in your eye after the treatment. Usually your eye feels better within 24 to 48 hours after the procedure.      You have been given drops that numb the surface of your eye. DO NOT rub or touch your eye. DO NOT wear contacts until numbness goes away.      You may experience small spots that appear in your field of vision. These are usually caused by an air bubble or from the medication. It takes a few hours or days for these to go away.      The use of sunglasses will help reduce light sensitivity and glare.      DO NOT swim or put sink water (tap water) in your eyes for at least 4 hours after the injection.      You may get a gritty, burning, irritating or stinging feeling in the injected eye as a result of the antiseptic used. Use artificial tears or eye lubricant to reduce the sensation. These are available over-the-counter from your local pharmacy. If you already have some at home, be sure to check the expiration date and to avoid contaminating your eye. A cool pack over your eye brow above the injected eye may also relieve discomfort.     Call us right away or go to the Emergency Department if you have a dramatic decrease in vision or extreme pain in the eye.   [facility] [phone]   Item: [id number]   Revised: 09/2015

## 2018-06-19 NOTE — PROGRESS NOTES
HPI     DLS-3/20/2018 Dr. Olsen    3 mons ck     Pt noticed that VA is stable ---denies eye pain, flashes or floaters    LBS - 190 this am     POHx:   NPDR /CSME   S/p PRP OD 5/5/15, fill in 5/26/15   S/p PRP OS 7/7/15     S/p focal laser os (6-26-12)   + IDDM >20 yrs     S/p avastin ou (3-20-18)   S/p avastin od (6-26-12)   S/p Avastin OS  (12/19/2017)    Last edited by Alaina Dominguez on 6/19/2018 10:15 AM. (History)            Assessment /Plan     For exam results, see Encounter Report.    There are no diagnoses linked to this encounter.  ***

## 2018-06-29 ENCOUNTER — TELEPHONE (OUTPATIENT)
Dept: GASTROENTEROLOGY | Facility: CLINIC | Age: 78
End: 2018-06-29

## 2018-06-29 DIAGNOSIS — K63.5 POLYP OF COLON, UNSPECIFIED PART OF COLON, UNSPECIFIED TYPE: Primary | ICD-10-CM

## 2018-07-05 ENCOUNTER — TELEPHONE (OUTPATIENT)
Dept: GASTROENTEROLOGY | Facility: CLINIC | Age: 78
End: 2018-07-05

## 2018-07-05 NOTE — TELEPHONE ENCOUNTER
Spoke with patient in regards to schedule colonoscopy for September. She is not ready to schedule at this time.

## 2018-07-06 DIAGNOSIS — Z86.010 HISTORY OF COLON POLYPS: Primary | ICD-10-CM

## 2018-07-08 RX ORDER — POTASSIUM CHLORIDE 750 MG/1
TABLET, EXTENDED RELEASE ORAL
Qty: 30 TABLET | Refills: 0 | Status: SHIPPED | OUTPATIENT
Start: 2018-07-08 | End: 2018-08-06 | Stop reason: SDUPTHER

## 2018-07-13 ENCOUNTER — TELEPHONE (OUTPATIENT)
Dept: GASTROENTEROLOGY | Facility: CLINIC | Age: 78
End: 2018-07-13

## 2018-07-13 NOTE — TELEPHONE ENCOUNTER
Spoke with patient. Colonoscopy scheduled for 9/12 at 10:30a. Endo scheduling nurse will contact patient with prep.

## 2018-07-13 NOTE — TELEPHONE ENCOUNTER
----- Message from Varsha Ball sent at 7/13/2018  2:49 PM CDT -----  Contact: self [phone]  emily - wants to talk to justin again - please call patient at [phone]

## 2018-07-31 ENCOUNTER — EXTERNAL CHRONIC CARE MANAGEMENT (OUTPATIENT)
Dept: PRIMARY CARE CLINIC | Facility: CLINIC | Age: 78
End: 2018-07-31
Payer: MEDICARE

## 2018-07-31 PROCEDURE — 99490 CHRNC CARE MGMT STAFF 1ST 20: CPT | Mod: S$PBB,,, | Performed by: INTERNAL MEDICINE

## 2018-07-31 PROCEDURE — 99490 CHRNC CARE MGMT STAFF 1ST 20: CPT | Mod: PBBFAC | Performed by: INTERNAL MEDICINE

## 2018-08-06 DIAGNOSIS — M05.9 SEROPOSITIVE RHEUMATOID ARTHRITIS: ICD-10-CM

## 2018-08-06 RX ORDER — FOLIC ACID 1 MG/1
TABLET ORAL
Qty: 90 TABLET | Refills: 0 | Status: SHIPPED | OUTPATIENT
Start: 2018-08-06 | End: 2018-11-08 | Stop reason: SDUPTHER

## 2018-08-06 RX ORDER — POTASSIUM CHLORIDE 750 MG/1
TABLET, EXTENDED RELEASE ORAL
Qty: 30 TABLET | Refills: 0 | Status: SHIPPED | OUTPATIENT
Start: 2018-08-06 | End: 2018-09-04 | Stop reason: SDUPTHER

## 2018-08-17 ENCOUNTER — TELEPHONE (OUTPATIENT)
Dept: GASTROENTEROLOGY | Facility: CLINIC | Age: 78
End: 2018-08-17

## 2018-08-17 NOTE — TELEPHONE ENCOUNTER
Spoke with patient to see if she wants to reschedule colonoscopy. She stated that she wanted to wait until after the first of the year.

## 2018-08-24 ENCOUNTER — INFUSION (OUTPATIENT)
Dept: INFECTIOUS DISEASES | Facility: HOSPITAL | Age: 78
End: 2018-08-24
Attending: INTERNAL MEDICINE
Payer: MEDICARE

## 2018-08-24 VITALS — BODY MASS INDEX: 29.34 KG/M2 | HEIGHT: 57 IN | WEIGHT: 136 LBS

## 2018-08-24 DIAGNOSIS — M81.0 SENILE OSTEOPOROSIS: Primary | ICD-10-CM

## 2018-08-24 PROCEDURE — 96372 THER/PROPH/DIAG INJ SC/IM: CPT

## 2018-08-24 PROCEDURE — 63600175 PHARM REV CODE 636 W HCPCS: Mod: JG | Performed by: INTERNAL MEDICINE

## 2018-08-24 RX ADMIN — DENOSUMAB 60 MG: 60 INJECTION SUBCUTANEOUS at 09:08

## 2018-08-31 ENCOUNTER — EXTERNAL CHRONIC CARE MANAGEMENT (OUTPATIENT)
Dept: PRIMARY CARE CLINIC | Facility: CLINIC | Age: 78
End: 2018-08-31
Payer: MEDICARE

## 2018-08-31 PROCEDURE — 99490 CHRNC CARE MGMT STAFF 1ST 20: CPT | Mod: S$PBB,,, | Performed by: INTERNAL MEDICINE

## 2018-08-31 PROCEDURE — 99490 CHRNC CARE MGMT STAFF 1ST 20: CPT | Mod: PBBFAC | Performed by: INTERNAL MEDICINE

## 2018-09-04 RX ORDER — POTASSIUM CHLORIDE 750 MG/1
TABLET, EXTENDED RELEASE ORAL
Qty: 30 TABLET | Refills: 0 | Status: SHIPPED | OUTPATIENT
Start: 2018-09-04 | End: 2018-10-02 | Stop reason: SDUPTHER

## 2018-09-16 RX ORDER — AMLODIPINE BESYLATE 5 MG/1
TABLET ORAL
Qty: 30 TABLET | Refills: 0 | Status: SHIPPED | OUTPATIENT
Start: 2018-09-16 | End: 2018-10-15 | Stop reason: SDUPTHER

## 2018-09-16 RX ORDER — METOPROLOL SUCCINATE 50 MG/1
TABLET, EXTENDED RELEASE ORAL
Qty: 30 TABLET | Refills: 0 | Status: SHIPPED | OUTPATIENT
Start: 2018-09-16 | End: 2018-11-15 | Stop reason: SDUPTHER

## 2018-09-21 ENCOUNTER — OFFICE VISIT (OUTPATIENT)
Dept: RHEUMATOLOGY | Facility: CLINIC | Age: 78
End: 2018-09-21
Payer: MEDICARE

## 2018-09-21 VITALS
SYSTOLIC BLOOD PRESSURE: 157 MMHG | WEIGHT: 145 LBS | HEIGHT: 62 IN | HEART RATE: 83 BPM | DIASTOLIC BLOOD PRESSURE: 80 MMHG | BODY MASS INDEX: 26.68 KG/M2

## 2018-09-21 DIAGNOSIS — M20.091 ULNAR DEVIATION OF FINGERS OF BOTH HANDS: Chronic | ICD-10-CM

## 2018-09-21 DIAGNOSIS — M05.79 RHEUMATOID ARTHRITIS INVOLVING MULTIPLE SITES WITH POSITIVE RHEUMATOID FACTOR: ICD-10-CM

## 2018-09-21 DIAGNOSIS — M81.0 OSTEOPOROSIS, POSTMENOPAUSAL: Chronic | ICD-10-CM

## 2018-09-21 DIAGNOSIS — M81.0 SENILE OSTEOPOROSIS: ICD-10-CM

## 2018-09-21 DIAGNOSIS — Z79.899 HIGH RISK MEDICATION USE: Chronic | ICD-10-CM

## 2018-09-21 DIAGNOSIS — M20.092 ULNAR DEVIATION OF FINGERS OF BOTH HANDS: Chronic | ICD-10-CM

## 2018-09-21 PROCEDURE — 99999 PR PBB SHADOW E&M-EST. PATIENT-LVL III: CPT | Mod: PBBFAC,,, | Performed by: INTERNAL MEDICINE

## 2018-09-21 PROCEDURE — 99214 OFFICE O/P EST MOD 30 MIN: CPT | Mod: S$PBB,,, | Performed by: INTERNAL MEDICINE

## 2018-09-21 PROCEDURE — 99213 OFFICE O/P EST LOW 20 MIN: CPT | Mod: PBBFAC | Performed by: INTERNAL MEDICINE

## 2018-09-21 RX ORDER — METHOTREXATE 2.5 MG/1
10 TABLET ORAL
Qty: 20 TABLET | Refills: 2 | Status: SHIPPED | OUTPATIENT
Start: 2018-09-21 | End: 2019-02-26

## 2018-09-21 RX ORDER — OMEPRAZOLE 20 MG/1
20 CAPSULE, DELAYED RELEASE ORAL DAILY
COMMUNITY

## 2018-09-21 ASSESSMENT — ROUTINE ASSESSMENT OF PATIENT INDEX DATA (RAPID3)
MDHAQ FUNCTION SCORE: .7
PSYCHOLOGICAL DISTRESS SCORE: 1.1
PAIN SCORE: 6.5
TOTAL RAPID3 SCORE: 5.44
FATIGUE SCORE: 5
WHEN YOU AWAKENED IN THE MORNING OVER THE LAST WEEK, PLEASE INDICATE THE AMOUNT OF TIME IT TAKES UNTIL YOU ARE AS LIMBER AS YOU WILL BE FOR THE DAY: 1 MINUTE
AM STIFFNESS SCORE: 1, YES
PATIENT GLOBAL ASSESSMENT SCORE: 7.5

## 2018-09-21 NOTE — PROGRESS NOTES
"Subjective:       Patient ID: Teresita Tolbert is a 77 y.o. female.    Chief Complaint: Disease Management    F/u RA, seropos on MTX 10 mg/week since May 2015  F/u osteoporosis with lumbar compressions   Prolia since 2016     Had prolia Feb 2018    PMH: osteoporosis; was on alendronate , switched to Prolia q6m  PMH: myasthenia gravis on prednisone and previously azathioprine until started MTX for RA       Currently MTX 10 mg/ week  Prednisone 5 mg/d    Joint swelling has not come back  Some knee pain   Lower back pain     Muscle weakness is unchanged    Occasional sharp pain; for few seconds    Review of Systems   Constitutional: Negative for fever and unexpected weight change.   HENT: Negative for mouth sores and trouble swallowing.         No Dry mouth   Eyes: Negative for redness.        No Dry eyes   Respiratory: Negative for cough and shortness of breath.    Cardiovascular: Negative for chest pain.   Gastrointestinal: Negative for abdominal pain, constipation and diarrhea.   Genitourinary: Negative for dysuria and genital sores.   Skin: Negative for rash.   Neurological: Negative for headaches.   Hematological: Negative for adenopathy. Does not bruise/bleed easily.   Psychiatric/Behavioral: Negative for sleep disturbance.         Objective:   BP (!) 157/80   Pulse 83   Ht 5' 2" (1.575 m)   Wt 65.8 kg (145 lb)   BMI 26.52 kg/m²      Physical Exam   Constitutional: She is well-developed, well-nourished, and in no distress.   HENT:   Mouth/Throat: Oropharynx is clear and moist.   Eyes: Conjunctivae are normal.   Cardiovascular: Normal rate and regular rhythm.    Pulmonary/Chest: She has no wheezes. She has no rales.   Kyphosis    Lymphadenopathy:     She has no cervical adenopathy.   Skin: No rash noted.     Musculoskeletal:   Reducible Ulnar deviation 3-5 bilaterally          Physical Exam     ARREDONDO-28 tender joint count: 0  ARREDONDO-28 swollen joint count: 0  Patient global assessment: 75       Lab Results "   Component Value Date    SEDRATE 25 (H) 05/07/2018          Assessment:       1. Senile osteoporosis    2. Rheumatoid arthritis involving multiple sites with positive rheumatoid factor    3. High risk medication use    4. 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 Osteoporosis, postmenopausal            Plan:       Problem List Items Addressed This Visit        Active Problems    High risk medication use (Chronic)     Tolerating mtx; needs lab monitoring; schedule lab q3m            Rheumatoid arthritis involving multiple sites with positive rheumatoid factor     RA appears well controlled on mtx plus prednisone 5 mg         Relevant Medications    methotrexate 2.5 MG Tab    Senile osteoporosis     Last Prolia Aug 24  Needs Vit D level  Did not get DXA; I will reorder         Relevant Orders    DXA Bone Density Spine And Hip    Ulnar deviation of the fingers (Chronic)     Appears stable ; photographed May 2018            Resolved Problems    RESOLVED: Osteoporosis, postmenopausal (Chronic)

## 2018-09-25 ENCOUNTER — PROCEDURE VISIT (OUTPATIENT)
Dept: OPHTHALMOLOGY | Facility: CLINIC | Age: 78
End: 2018-09-25
Payer: MEDICARE

## 2018-09-25 ENCOUNTER — TELEPHONE (OUTPATIENT)
Dept: RHEUMATOLOGY | Facility: CLINIC | Age: 78
End: 2018-09-25

## 2018-09-25 ENCOUNTER — CLINICAL SUPPORT (OUTPATIENT)
Dept: INFECTIOUS DISEASES | Facility: CLINIC | Age: 78
End: 2018-09-25
Payer: MEDICARE

## 2018-09-25 VITALS — DIASTOLIC BLOOD PRESSURE: 79 MMHG | HEART RATE: 81 BPM | SYSTOLIC BLOOD PRESSURE: 159 MMHG

## 2018-09-25 DIAGNOSIS — H43.813 POSTERIOR VITREOUS DETACHMENT OF BOTH EYES: ICD-10-CM

## 2018-09-25 DIAGNOSIS — M05.79 RHEUMATOID ARTHRITIS INVOLVING MULTIPLE SITES WITH POSITIVE RHEUMATOID FACTOR: Primary | ICD-10-CM

## 2018-09-25 DIAGNOSIS — E11.3513 CONTROLLED TYPE 2 DIABETES MELLITUS WITH BOTH EYES AFFECTED BY PROLIFERATIVE RETINOPATHY AND MACULAR EDEMA, WITHOUT LONG-TERM CURRENT USE OF INSULIN: Primary | ICD-10-CM

## 2018-09-25 DIAGNOSIS — H35.373 EPIRETINAL MEMBRANE (ERM) OF BOTH EYES: ICD-10-CM

## 2018-09-25 PROCEDURE — 67028 INJECTION EYE DRUG: CPT | Mod: 50,S$PBB,, | Performed by: OPHTHALMOLOGY

## 2018-09-25 PROCEDURE — 90662 IIV NO PRSV INCREASED AG IM: CPT | Mod: PBBFAC

## 2018-09-25 PROCEDURE — 92134 CPTRZ OPH DX IMG PST SGM RTA: CPT | Mod: PBBFAC | Performed by: OPHTHALMOLOGY

## 2018-09-25 PROCEDURE — C9257 BEVACIZUMAB INJECTION: HCPCS | Mod: PBBFAC,JG | Performed by: OPHTHALMOLOGY

## 2018-09-25 PROCEDURE — 90670 PCV13 VACCINE IM: CPT | Mod: PBBFAC

## 2018-09-25 PROCEDURE — 92014 COMPRE OPH EXAM EST PT 1/>: CPT | Mod: 25,S$PBB,, | Performed by: OPHTHALMOLOGY

## 2018-09-25 PROCEDURE — 67028 INJECTION EYE DRUG: CPT | Mod: 50,PBBFAC | Performed by: OPHTHALMOLOGY

## 2018-09-25 RX ADMIN — BEVACIZUMAB 1.25 MG: 100 INJECTION, SOLUTION INTRAVENOUS at 11:09

## 2018-09-25 NOTE — PATIENT INSTRUCTIONS

## 2018-09-25 NOTE — PROGRESS NOTES
HPI     Eye Problem      Additional comments: 3 mos check              Comments     DLS 6/19/18- She has been having trouble with eyelids itching and eyes tearing  HPI     DLS-3/20/2018 Dr. Olsen    3 mons ck     Pt noticed that VA is stable ---denies eye pain, flashes or floaters    LBS - 190 this am       OCT - DME OU - good focal OU  DME OD slight increase DME improved OS    Prior FA shows good perfusion time OU, multiple late leakage points in macula OU, staining of peripheral laser scars OU, mild late leakage from nerve OS      A/P    1-PDR OS>OD:   - S/p PRP OD (06/16/15)   - S/p PRP OS (07/07/15)   - Last HbA1c was 6.4 on 4/8/15   - Emphasized the importance of tight glucose control    2-DME OU   - s/p focal OU   -  Increase in cystoid fluid OS  S/p resumed avastin OD x 2, OS x 8  3/18 - increased DME OD, stable OS  6/18 - q 3 month Avastin working OU      Avastin OU today    3-PCIOL OU    4-Ectropion OU:  Saw Mary for repair  Incomplete closer with some moderate inf PEEs         3 months OCT      Risks, benefits, and alternatives to treatment discussed in detail with the patient.  The patient voiced understanding and wished to proceed with the procedure    Injection Procedure Note:  Diagnosis: DME OU    Topical Proparacaine and Betadine.  Inject Avastin OU at 6:00 @ 3.5-4mm posterior to limbus  Post Operative Dx: Same  Complications: None  Follow up as above.

## 2018-09-25 NOTE — PROGRESS NOTES
Mrs. Tolbert received Flu and Prevnar 13 vaccines and tolerated them well. She left the unit in NAD.

## 2018-09-30 ENCOUNTER — EXTERNAL CHRONIC CARE MANAGEMENT (OUTPATIENT)
Dept: PRIMARY CARE CLINIC | Facility: CLINIC | Age: 78
End: 2018-09-30
Payer: MEDICARE

## 2018-09-30 PROCEDURE — 99490 CHRNC CARE MGMT STAFF 1ST 20: CPT | Mod: S$PBB,,, | Performed by: INTERNAL MEDICINE

## 2018-09-30 PROCEDURE — 99490 CHRNC CARE MGMT STAFF 1ST 20: CPT | Mod: PBBFAC | Performed by: INTERNAL MEDICINE

## 2018-10-02 RX ORDER — POTASSIUM CHLORIDE 750 MG/1
TABLET, EXTENDED RELEASE ORAL
Qty: 30 TABLET | Refills: 0 | Status: SHIPPED | OUTPATIENT
Start: 2018-10-02 | End: 2018-11-02 | Stop reason: SDUPTHER

## 2018-10-02 NOTE — TELEPHONE ENCOUNTER
----- Message from Hedy Watkins sent at 10/2/2018 11:36 AM CDT -----  Contact: Self [phone]  PT is requesting an order for a new meter and supplies.     ..  [employer] [address] [place] [address] AT [address] [address]  Phone: [phone] Fax: [phone]

## 2018-10-12 ENCOUNTER — HOSPITAL ENCOUNTER (OUTPATIENT)
Dept: RADIOLOGY | Facility: OTHER | Age: 78
Discharge: HOME | End: 2018-10-12
Attending: INTERNAL MEDICINE
Payer: MEDICARE

## 2018-10-12 DIAGNOSIS — M81.0 SENILE OSTEOPOROSIS: ICD-10-CM

## 2018-10-12 PROCEDURE — 77080 DXA BONE DENSITY AXIAL: CPT | Mod: TC

## 2018-10-12 PROCEDURE — 77080 DXA BONE DENSITY AXIAL: CPT | Mod: 26,,, | Performed by: RADIOLOGY

## 2018-10-15 RX ORDER — AMLODIPINE BESYLATE 5 MG/1
TABLET ORAL
Qty: 30 TABLET | Refills: 0 | Status: SHIPPED | OUTPATIENT
Start: 2018-10-15 | End: 2018-11-14 | Stop reason: SDUPTHER

## 2018-10-31 ENCOUNTER — EXTERNAL CHRONIC CARE MANAGEMENT (OUTPATIENT)
Dept: PRIMARY CARE CLINIC | Facility: CLINIC | Age: 78
End: 2018-10-31
Payer: MEDICARE

## 2018-10-31 PROCEDURE — 99490 CHRNC CARE MGMT STAFF 1ST 20: CPT | Mod: S$PBB,,, | Performed by: INTERNAL MEDICINE

## 2018-10-31 PROCEDURE — 99490 CHRNC CARE MGMT STAFF 1ST 20: CPT | Mod: PBBFAC | Performed by: INTERNAL MEDICINE

## 2018-11-02 RX ORDER — POTASSIUM CHLORIDE 750 MG/1
TABLET, EXTENDED RELEASE ORAL
Qty: 30 TABLET | Refills: 0 | Status: SHIPPED | OUTPATIENT
Start: 2018-11-02 | End: 2018-12-05 | Stop reason: SDUPTHER

## 2018-11-08 DIAGNOSIS — M05.9 SEROPOSITIVE RHEUMATOID ARTHRITIS: ICD-10-CM

## 2018-11-09 RX ORDER — FOLIC ACID 1 MG/1
TABLET ORAL
Qty: 90 TABLET | Refills: 1 | Status: SHIPPED | OUTPATIENT
Start: 2018-11-09 | End: 2019-05-08 | Stop reason: SDUPTHER

## 2018-11-14 RX ORDER — AMLODIPINE BESYLATE 5 MG/1
TABLET ORAL
Qty: 30 TABLET | Refills: 0 | Status: SHIPPED | OUTPATIENT
Start: 2018-11-14 | End: 2018-12-14 | Stop reason: SDUPTHER

## 2018-11-15 RX ORDER — METOPROLOL SUCCINATE 50 MG/1
TABLET, EXTENDED RELEASE ORAL
Qty: 30 TABLET | Refills: 0 | Status: SHIPPED | OUTPATIENT
Start: 2018-11-15 | End: 2018-12-14 | Stop reason: SDUPTHER

## 2018-11-30 ENCOUNTER — EXTERNAL CHRONIC CARE MANAGEMENT (OUTPATIENT)
Dept: PRIMARY CARE CLINIC | Facility: CLINIC | Age: 78
End: 2018-11-30
Payer: MEDICARE

## 2018-11-30 PROCEDURE — 99490 CHRNC CARE MGMT STAFF 1ST 20: CPT | Mod: PBBFAC | Performed by: INTERNAL MEDICINE

## 2018-11-30 PROCEDURE — 99490 CHRNC CARE MGMT STAFF 1ST 20: CPT | Mod: S$PBB,,, | Performed by: INTERNAL MEDICINE

## 2018-12-05 RX ORDER — POTASSIUM CHLORIDE 750 MG/1
TABLET, EXTENDED RELEASE ORAL
Qty: 30 TABLET | Refills: 0 | Status: SHIPPED | OUTPATIENT
Start: 2018-12-05 | End: 2019-01-07 | Stop reason: SDUPTHER

## 2018-12-11 ENCOUNTER — TELEPHONE (OUTPATIENT)
Dept: ENDOSCOPY | Facility: HOSPITAL | Age: 78
End: 2018-12-11

## 2018-12-14 RX ORDER — METOPROLOL SUCCINATE 50 MG/1
TABLET, EXTENDED RELEASE ORAL
Qty: 30 TABLET | Refills: 0 | Status: SHIPPED | OUTPATIENT
Start: 2018-12-14 | End: 2019-01-15 | Stop reason: SDUPTHER

## 2018-12-14 RX ORDER — AMLODIPINE BESYLATE 5 MG/1
TABLET ORAL
Qty: 30 TABLET | Refills: 0 | Status: SHIPPED | OUTPATIENT
Start: 2018-12-14 | End: 2019-01-14 | Stop reason: SDUPTHER

## 2018-12-19 ENCOUNTER — LAB VISIT (OUTPATIENT)
Dept: LAB | Facility: HOSPITAL | Age: 78
End: 2018-12-19
Attending: INTERNAL MEDICINE
Payer: MEDICARE

## 2018-12-19 DIAGNOSIS — Z79.899 HIGH RISK MEDICATION USE: Chronic | ICD-10-CM

## 2018-12-19 DIAGNOSIS — M05.79 RHEUMATOID ARTHRITIS INVOLVING MULTIPLE SITES WITH POSITIVE RHEUMATOID FACTOR: ICD-10-CM

## 2018-12-19 LAB
ALBUMIN SERPL BCP-MCNC: 3.8 G/DL
ALP SERPL-CCNC: 56 U/L
ALT SERPL W/O P-5'-P-CCNC: 63 U/L
ANION GAP SERPL CALC-SCNC: 12 MMOL/L
AST SERPL-CCNC: 49 U/L
BASOPHILS # BLD AUTO: 0.06 K/UL
BASOPHILS NFR BLD: 0.8 %
BILIRUB SERPL-MCNC: 0.8 MG/DL
BUN SERPL-MCNC: 9 MG/DL
CALCIUM SERPL-MCNC: 8.6 MG/DL
CHLORIDE SERPL-SCNC: 106 MMOL/L
CO2 SERPL-SCNC: 27 MMOL/L
CREAT SERPL-MCNC: 0.9 MG/DL
CRP SERPL-MCNC: 2.5 MG/L
DIFFERENTIAL METHOD: ABNORMAL
EOSINOPHIL # BLD AUTO: 0.4 K/UL
EOSINOPHIL NFR BLD: 4.4 %
ERYTHROCYTE [DISTWIDTH] IN BLOOD BY AUTOMATED COUNT: 14.3 %
ERYTHROCYTE [SEDIMENTATION RATE] IN BLOOD BY WESTERGREN METHOD: 17 MM/HR
EST. GFR  (AFRICAN AMERICAN): >60 ML/MIN/1.73 M^2
EST. GFR  (NON AFRICAN AMERICAN): >60 ML/MIN/1.73 M^2
GLUCOSE SERPL-MCNC: 232 MG/DL
HCT VFR BLD AUTO: 42.4 %
HGB BLD-MCNC: 12.6 G/DL
LYMPHOCYTES # BLD AUTO: 1.6 K/UL
LYMPHOCYTES NFR BLD: 20.3 %
MCH RBC QN AUTO: 27.5 PG
MCHC RBC AUTO-ENTMCNC: 29.7 G/DL
MCV RBC AUTO: 92 FL
MONOCYTES # BLD AUTO: 0.5 K/UL
MONOCYTES NFR BLD: 6.5 %
NEUTROPHILS # BLD AUTO: 5.4 K/UL
NEUTROPHILS NFR BLD: 67.4 %
NRBC BLD-RTO: 0 /100 WBC
PLATELET # BLD AUTO: 206 K/UL
PMV BLD AUTO: 11.5 FL
POTASSIUM SERPL-SCNC: 3.7 MMOL/L
PROT SERPL-MCNC: 7.1 G/DL
RBC # BLD AUTO: 4.59 M/UL
SODIUM SERPL-SCNC: 145 MMOL/L
WBC # BLD AUTO: 7.98 K/UL

## 2018-12-19 PROCEDURE — 85652 RBC SED RATE AUTOMATED: CPT

## 2018-12-19 PROCEDURE — 36415 COLL VENOUS BLD VENIPUNCTURE: CPT

## 2018-12-19 PROCEDURE — 85025 COMPLETE CBC W/AUTO DIFF WBC: CPT

## 2018-12-19 PROCEDURE — 80053 COMPREHEN METABOLIC PANEL: CPT

## 2018-12-19 PROCEDURE — 86140 C-REACTIVE PROTEIN: CPT

## 2018-12-20 ENCOUNTER — TELEPHONE (OUTPATIENT)
Dept: RHEUMATOLOGY | Facility: CLINIC | Age: 78
End: 2018-12-20

## 2018-12-20 DIAGNOSIS — Z79.899 HIGH RISK MEDICATION USE: Primary | Chronic | ICD-10-CM

## 2018-12-20 NOTE — TELEPHONE ENCOUNTER
I got voice mail  Please call patient and ask her to reduce methotrexate to 3 tablets / week due to elevated liver enzymes on recent lab and repeat lab in one month (I ordered a hepatic panel for January)  WD

## 2018-12-28 ENCOUNTER — PROCEDURE VISIT (OUTPATIENT)
Dept: OPHTHALMOLOGY | Facility: CLINIC | Age: 78
End: 2018-12-28
Payer: MEDICARE

## 2018-12-28 VITALS — DIASTOLIC BLOOD PRESSURE: 83 MMHG | SYSTOLIC BLOOD PRESSURE: 195 MMHG | HEART RATE: 75 BPM

## 2018-12-28 DIAGNOSIS — E11.3513 CONTROLLED TYPE 2 DIABETES MELLITUS WITH BOTH EYES AFFECTED BY PROLIFERATIVE RETINOPATHY AND MACULAR EDEMA, WITHOUT LONG-TERM CURRENT USE OF INSULIN: Primary | ICD-10-CM

## 2018-12-28 DIAGNOSIS — H43.813 POSTERIOR VITREOUS DETACHMENT OF BOTH EYES: ICD-10-CM

## 2018-12-28 PROCEDURE — C9257 BEVACIZUMAB INJECTION: HCPCS | Mod: PBBFAC,JG | Performed by: OPHTHALMOLOGY

## 2018-12-28 PROCEDURE — 67028 INJECTION EYE DRUG: CPT | Mod: 50,S$PBB,, | Performed by: OPHTHALMOLOGY

## 2018-12-28 PROCEDURE — 67028 INJECTION EYE DRUG: CPT | Mod: 50,PBBFAC | Performed by: OPHTHALMOLOGY

## 2018-12-28 PROCEDURE — 92134 CPTRZ OPH DX IMG PST SGM RTA: CPT | Mod: PBBFAC | Performed by: OPHTHALMOLOGY

## 2018-12-28 PROCEDURE — 92014 COMPRE OPH EXAM EST PT 1/>: CPT | Mod: 25,S$PBB,, | Performed by: OPHTHALMOLOGY

## 2018-12-28 RX ADMIN — Medication 1.25 MG: at 09:12

## 2018-12-28 RX ADMIN — BEVACIZUMAB 1.25 MG: 100 INJECTION, SOLUTION INTRAVENOUS at 09:12

## 2018-12-28 NOTE — PROGRESS NOTES
HPI     DLS 9/25/18- -Dr Olsen     3 mons ck for PDR with edema       Pt noticed that VA is unchanged since last exam   -eye pain   -flashes   +floaters, after injection           OCT - DME OU - good focal OU  Slight increase OS    Prior FA shows good perfusion time OU, multiple late leakage points in macula OU, staining of peripheral laser scars OU, mild late leakage from nerve OS      A/P    1-PDR OS>OD:   - S/p PRP OD (06/16/15)   - S/p PRP OS (07/07/15)   - Last HbA1c was 6.4 on 4/8/15   - Emphasized the importance of tight glucose control    2-DME OU   - s/p focal OU   -  Increase in cystoid fluid OS  S/p resumed avastin OD x 3, OS x 9  3/18 - increased DME OD, stable OS  6/18 - q 3 month Avastin working OU      Avastin OU today    Get approval for Ozurdex    3-PCIOL OU    4-Ectropion OU:  Saw Mary for repair  Incomplete closer with some moderate inf PEEs         3 months OCT      Risks, benefits, and alternatives to treatment discussed in detail with the patient.  The patient voiced understanding and wished to proceed with the procedure    Injection Procedure Note:  Diagnosis: DME OU    Patient Identified and Time Out complete  Topical Proparacaine and Betadine.  Inject Avastin OU at 6:00 @ 3.5-4mm posterior to limbus  Post Operative Dx: Same  Complications: None  Follow up as above.

## 2018-12-28 NOTE — PATIENT INSTRUCTIONS

## 2019-01-01 DIAGNOSIS — G70.00 MYASTHENIA GRAVIS STATUS POST THYMECTOMY: ICD-10-CM

## 2019-01-01 DIAGNOSIS — Z90.89 MYASTHENIA GRAVIS STATUS POST THYMECTOMY: ICD-10-CM

## 2019-01-02 RX ORDER — PREDNISONE 5 MG/1
TABLET ORAL
Qty: 90 TABLET | Refills: 3 | Status: SHIPPED | OUTPATIENT
Start: 2019-01-02 | End: 2019-09-23 | Stop reason: SDUPTHER

## 2019-01-04 DIAGNOSIS — E11.3513 CONTROLLED TYPE 2 DIABETES MELLITUS WITH BOTH EYES AFFECTED BY PROLIFERATIVE RETINOPATHY AND MACULAR EDEMA, WITHOUT LONG-TERM CURRENT USE OF INSULIN: Primary | ICD-10-CM

## 2019-01-07 RX ORDER — POTASSIUM CHLORIDE 750 MG/1
TABLET, EXTENDED RELEASE ORAL
Qty: 30 TABLET | Refills: 0 | Status: SHIPPED | OUTPATIENT
Start: 2019-01-07 | End: 2019-02-04 | Stop reason: SDUPTHER

## 2019-01-15 RX ORDER — AMLODIPINE BESYLATE 5 MG/1
TABLET ORAL
Qty: 30 TABLET | Refills: 0 | Status: SHIPPED | OUTPATIENT
Start: 2019-01-15 | End: 2019-02-13 | Stop reason: SDUPTHER

## 2019-01-16 RX ORDER — METOPROLOL SUCCINATE 50 MG/1
TABLET, EXTENDED RELEASE ORAL
Qty: 30 TABLET | Refills: 0 | Status: SHIPPED | OUTPATIENT
Start: 2019-01-16 | End: 2019-02-13 | Stop reason: SDUPTHER

## 2019-01-17 ENCOUNTER — LAB VISIT (OUTPATIENT)
Dept: LAB | Facility: HOSPITAL | Age: 79
End: 2019-01-17
Attending: INTERNAL MEDICINE
Payer: MEDICARE

## 2019-01-17 DIAGNOSIS — Z79.899 HIGH RISK MEDICATION USE: Chronic | ICD-10-CM

## 2019-01-17 LAB
ALBUMIN SERPL BCP-MCNC: 3.9 G/DL
ALP SERPL-CCNC: 61 U/L
ALT SERPL W/O P-5'-P-CCNC: 41 U/L
AST SERPL-CCNC: 29 U/L
BILIRUB DIRECT SERPL-MCNC: 0.3 MG/DL
BILIRUB SERPL-MCNC: 0.8 MG/DL
PROT SERPL-MCNC: 7.1 G/DL

## 2019-01-17 PROCEDURE — 36415 COLL VENOUS BLD VENIPUNCTURE: CPT

## 2019-01-17 PROCEDURE — 80076 HEPATIC FUNCTION PANEL: CPT

## 2019-01-23 ENCOUNTER — OFFICE VISIT (OUTPATIENT)
Dept: ENDOCRINOLOGY | Facility: CLINIC | Age: 79
End: 2019-01-23
Payer: MEDICARE

## 2019-01-23 VITALS
BODY MASS INDEX: 24.85 KG/M2 | DIASTOLIC BLOOD PRESSURE: 78 MMHG | HEIGHT: 62 IN | SYSTOLIC BLOOD PRESSURE: 160 MMHG | WEIGHT: 135.06 LBS | HEART RATE: 80 BPM

## 2019-01-23 DIAGNOSIS — E11.65 UNCONTROLLED TYPE 2 DIABETES MELLITUS WITH HYPERGLYCEMIA: Primary | ICD-10-CM

## 2019-01-23 DIAGNOSIS — M81.0 SENILE OSTEOPOROSIS: ICD-10-CM

## 2019-01-23 DIAGNOSIS — G59 MONONEUROPATHY DUE TO UNDERLYING DISEASE: ICD-10-CM

## 2019-01-23 PROCEDURE — 99999 PR PBB SHADOW E&M-EST. PATIENT-LVL IV: ICD-10-PCS | Mod: PBBFAC,,, | Performed by: INTERNAL MEDICINE

## 2019-01-23 PROCEDURE — 99214 OFFICE O/P EST MOD 30 MIN: CPT | Mod: S$PBB,,, | Performed by: INTERNAL MEDICINE

## 2019-01-23 PROCEDURE — 99214 OFFICE O/P EST MOD 30 MIN: CPT | Mod: PBBFAC | Performed by: INTERNAL MEDICINE

## 2019-01-23 PROCEDURE — 99999 PR PBB SHADOW E&M-EST. PATIENT-LVL IV: CPT | Mod: PBBFAC,,, | Performed by: INTERNAL MEDICINE

## 2019-01-23 PROCEDURE — 99214 PR OFFICE/OUTPT VISIT, EST, LEVL IV, 30-39 MIN: ICD-10-PCS | Mod: S$PBB,,, | Performed by: INTERNAL MEDICINE

## 2019-01-23 RX ORDER — ERGOCALCIFEROL 1.25 MG/1
50000 CAPSULE ORAL WEEKLY
Qty: 4 CAPSULE | Refills: 6 | Status: SHIPPED | OUTPATIENT
Start: 2019-01-23 | End: 2019-02-22

## 2019-01-23 RX ORDER — VIT C/E/ZN/COPPR/LUTEIN/ZEAXAN 250MG-90MG
2000 CAPSULE ORAL DAILY
Qty: 100 CAPSULE | Refills: 12 | Status: SHIPPED | OUTPATIENT
Start: 2019-01-23

## 2019-01-23 NOTE — PROGRESS NOTES
Subjective:      Patient ID: Teresita Tolbert is a 78 y.o. female.    Chief Complaint:  Follow-up; Diabetes; and Osteoporosis      History of Present Illness  F/U osteoporosis and T2DM with DR.      Pt has had diabetes dating back to the mid 1980s. She had myasthenia   diagnosed in 1979, underwent a thymectomy in 1980 and has been on chronic   steroid administration since. She takes 10 mg prednisone every other day.    She is currently taking Janumet 50/500 bid. She stopped insulin in Nov. because of hypoglycemia.   Fasting BG all at goal.    Hemoglobin A1C   Date Value Ref Range Status   01/30/2018 7.2 (H) 4.0 - 5.6 % Final     Comment:     According to ADA guidelines, hemoglobin A1c <7.0% represents  optimal control in non-pregnant diabetic patients. Different  metrics may apply to specific patient populations.   Standards of Medical Care in Diabetes-2016.  For the purpose of screening for the presence of diabetes:  <5.7%     Consistent with the absence of diabetes  5.7-6.4%  Consistent with increasing risk for diabetes   (prediabetes)  >or=6.5%  Consistent with diabetes  Currently, no consensus exists for use of hemoglobin A1c  for diagnosis of diabetes for children.  This Hemoglobin A1c assay has significant interference with fetal   hemoglobin   (HbF). The results are invalid for patients with abnormal amounts of   HbF,   including those with known Hereditary Persistence   of Fetal Hemoglobin. Heterozygous hemoglobin variants (HbAS, HbAC,   HbAD, HbAE, HbA2) do not significantly interfere with this assay;   however, presence of multiple variants in a sample may impact the %   interference.     03/24/2017 7.1 (H) 4.5 - 6.2 % Final     Comment:     According to ADA guidelines, hemoglobin A1C <7.0% represents  optimal control in non-pregnant diabetic patients.  Different  metrics may apply to specific populations.   Standards of Medical Care in Diabetes - 2016.  For the purpose of screening for the presence of  diabetes:  <5.7%     Consistent with the absence of diabetes  5.7-6.4%  Consistent with increasing risk for diabetes   (prediabetes)  >or=6.5%  Consistent with diabetes  Currently no consensus exists for use of hemoglobin A1C  for diagnosis of diabetes for children.     08/25/2016 7.0 (H) 4.5 - 6.2 % Final     Comment:     According to ADA guidelines, hemoglobin A1C <7.0% represents  optimal control in non-pregnant diabetic patients.  Different  metrics may apply to specific populations.   Standards of Medical Care in Diabetes - 2016.  For the purpose of screening for the presence of diabetes:  <5.7%     Consistent with the absence of diabetes  5.7-6.4%  Consistent with increasing risk for diabetes   (prediabetes)  >or=6.5%  Consistent with diabetes  Currently no consensus exists for use of hemoglobin A1C  for diagnosis of diabetes for children.       Pt did not take fosamax.  Pt received Prolia once in 05/2014 however she did not like the injection.  No new falls or fractures.  Last BMD 12/2013.    Review of Systems   Constitutional: Positive for appetite change. Negative for unexpected weight change.   HENT: Positive for drooling.    Eyes: Negative for visual disturbance.   Respiratory: Negative for choking, chest tightness and shortness of breath.    Cardiovascular: Negative for chest pain, palpitations and leg swelling.   Gastrointestinal: Negative for constipation and diarrhea.   Endocrine: Negative for polydipsia and polyuria.   Genitourinary: Negative for frequency.   Musculoskeletal: Positive for arthralgias, back pain, gait problem and joint swelling.   Neurological: Positive for facial asymmetry. Negative for numbness.       Objective:   Physical Exam   Neck: No thyromegaly present.   Cardiovascular: Normal rate and regular rhythm.   Pulmonary/Chest: Effort normal and breath sounds normal.   Neurological: She is alert.   Skin: Skin is warm.   Psychiatric: She has a normal mood and affect.   Vitals  reviewed.      Lab Review:   Hemoglobin A1C   Date Value Ref Range Status   01/30/2018 7.2 (H) 4.0 - 5.6 % Final     Comment:     According to ADA guidelines, hemoglobin A1c <7.0% represents  optimal control in non-pregnant diabetic patients. Different  metrics may apply to specific patient populations.   Standards of Medical Care in Diabetes-2016.  For the purpose of screening for the presence of diabetes:  <5.7%     Consistent with the absence of diabetes  5.7-6.4%  Consistent with increasing risk for diabetes   (prediabetes)  >or=6.5%  Consistent with diabetes  Currently, no consensus exists for use of hemoglobin A1c  for diagnosis of diabetes for children.  This Hemoglobin A1c assay has significant interference with fetal   hemoglobin   (HbF). The results are invalid for patients with abnormal amounts of   HbF,   including those with known Hereditary Persistence   of Fetal Hemoglobin. Heterozygous hemoglobin variants (HbAS, HbAC,   HbAD, HbAE, HbA2) do not significantly interfere with this assay;   however, presence of multiple variants in a sample may impact the %   interference.     03/24/2017 7.1 (H) 4.5 - 6.2 % Final     Comment:     According to ADA guidelines, hemoglobin A1C <7.0% represents  optimal control in non-pregnant diabetic patients.  Different  metrics may apply to specific populations.   Standards of Medical Care in Diabetes - 2016.  For the purpose of screening for the presence of diabetes:  <5.7%     Consistent with the absence of diabetes  5.7-6.4%  Consistent with increasing risk for diabetes   (prediabetes)  >or=6.5%  Consistent with diabetes  Currently no consensus exists for use of hemoglobin A1C  for diagnosis of diabetes for children.     08/25/2016 7.0 (H) 4.5 - 6.2 % Final     Comment:     According to ADA guidelines, hemoglobin A1C <7.0% represents  optimal control in non-pregnant diabetic patients.  Different  metrics may apply to specific populations.   Standards of Medical Care in  Diabetes - 2016.  For the purpose of screening for the presence of diabetes:  <5.7%     Consistent with the absence of diabetes  5.7-6.4%  Consistent with increasing risk for diabetes   (prediabetes)  >or=6.5%  Consistent with diabetes  Currently no consensus exists for use of hemoglobin A1C  for diagnosis of diabetes for children.        Ref. Range 5/6/2015 14:35   Sodium Latest Range: 136-145 mmol/L 143   Potassium Latest Range: 3.5-5.1 mmol/L 4.4   Chloride Latest Range:  mmol/L 108   CO2 Latest Range: 23-29 mmol/L 25   Anion Gap Latest Range: 8-16 mmol/L 10   BUN, Bld Latest Range: 8-23 mg/dL 17   Creatinine Latest Range: 0.5-1.4 mg/dL 0.8   eGFR if non  Latest Range: >60 mL/min/1.73 m^2 >60.0   eGFR if  Latest Range: >60 mL/min/1.73 m^2 >60.0   Glucose Latest Range:  mg/dL 102   Calcium Latest Range: 8.7-10.5 mg/dL 9.5   Alkaline Phosphatase Latest Range:  U/L 76   Total Protein Latest Range: 6.0-8.4 g/dL 6.8   Albumin Latest Range: 3.5-5.2 g/dL 3.4 (L)   Total Bilirubin Latest Range: 0.1-1.0 mg/dL 1.2 (H)   AST Latest Range: 10-40 U/L 31   ALT Latest Range: 10-44 U/L 25   CRP Latest Range: 0.0-8.2 mg/L 0.7      Ref. Range 12/31/2013 15:15   Vit D, 25-Hydroxy Latest Range: 30-96 ng/mL 56     12/2013  BONE MINERAL DENSITY RESULTS:  Lumbar Spine: Lumbar bone mineral density L1-L4 is 0.891g/cm2, which is a t-score of -2.4. The z-score is 0.2.    Total Hip: The total hip bone mineral density is 0.670g/cm2. The t-score is -2.3, and the z-score is -1.1. Femoral neck BMD is 0.579g/cm2 and the t-score is -2.6.    COMPARISONS:  Date Location BMD T-score  03/17/11 L-spine 0.908 -2.2  Total Hip 0.658 -2.4   Assessment:     Plan:   IMPRESSION: Type 2 diabetes, controlled with   She also has longstanding Myasthenia gravis, longstanding steroid use, Osteoporosis.    Continue Janumet to BID      Prolia ordered. Check vit D and start 2000 units daily.

## 2019-02-04 RX ORDER — POTASSIUM CHLORIDE 750 MG/1
TABLET, EXTENDED RELEASE ORAL
Qty: 30 TABLET | Refills: 0 | Status: SHIPPED | OUTPATIENT
Start: 2019-02-04 | End: 2019-03-04 | Stop reason: SDUPTHER

## 2019-02-07 ENCOUNTER — PATIENT MESSAGE (OUTPATIENT)
Dept: ENDOSCOPY | Facility: HOSPITAL | Age: 79
End: 2019-02-07

## 2019-02-14 RX ORDER — AMLODIPINE BESYLATE 5 MG/1
TABLET ORAL
Qty: 30 TABLET | Refills: 3 | Status: SHIPPED | OUTPATIENT
Start: 2019-02-14 | End: 2019-06-14 | Stop reason: SDUPTHER

## 2019-02-14 RX ORDER — METOPROLOL SUCCINATE 50 MG/1
TABLET, EXTENDED RELEASE ORAL
Qty: 30 TABLET | Refills: 3 | Status: SHIPPED | OUTPATIENT
Start: 2019-02-14 | End: 2019-06-14 | Stop reason: SDUPTHER

## 2019-02-25 ENCOUNTER — INFUSION (OUTPATIENT)
Dept: INFECTIOUS DISEASES | Facility: HOSPITAL | Age: 79
End: 2019-02-25
Attending: INTERNAL MEDICINE
Payer: MEDICARE

## 2019-02-25 VITALS — WEIGHT: 135.13 LBS | BODY MASS INDEX: 24.87 KG/M2 | HEIGHT: 62 IN

## 2019-02-25 DIAGNOSIS — M81.0 SENILE OSTEOPOROSIS: Primary | ICD-10-CM

## 2019-02-25 PROCEDURE — 63600175 PHARM REV CODE 636 W HCPCS: Mod: JG | Performed by: INTERNAL MEDICINE

## 2019-02-25 PROCEDURE — 96372 THER/PROPH/DIAG INJ SC/IM: CPT

## 2019-02-25 RX ADMIN — DENOSUMAB 60 MG: 60 INJECTION SUBCUTANEOUS at 09:02

## 2019-02-26 DIAGNOSIS — M05.79 RHEUMATOID ARTHRITIS INVOLVING MULTIPLE SITES WITH POSITIVE RHEUMATOID FACTOR: ICD-10-CM

## 2019-02-26 RX ORDER — METHOTREXATE 2.5 MG/1
TABLET ORAL
Qty: 20 TABLET | Refills: 1 | Status: SHIPPED | OUTPATIENT
Start: 2019-02-26 | End: 2019-04-01

## 2019-02-26 RX ORDER — METHOTREXATE 2.5 MG/1
TABLET ORAL
Qty: 20 TABLET | Refills: 0 | Status: SHIPPED | OUTPATIENT
Start: 2019-02-26 | End: 2019-02-26

## 2019-02-26 RX ORDER — SITAGLIPTIN AND METFORMIN HYDROCHLORIDE 500; 50 MG/1; MG/1
TABLET, FILM COATED ORAL
Qty: 60 TABLET | Refills: 0 | Status: SHIPPED | OUTPATIENT
Start: 2019-02-26 | End: 2019-03-27 | Stop reason: SDUPTHER

## 2019-03-01 ENCOUNTER — PES CALL (OUTPATIENT)
Dept: ADMINISTRATIVE | Facility: CLINIC | Age: 79
End: 2019-03-01

## 2019-03-04 RX ORDER — POTASSIUM CHLORIDE 750 MG/1
TABLET, EXTENDED RELEASE ORAL
Qty: 30 TABLET | Refills: 0 | Status: SHIPPED | OUTPATIENT
Start: 2019-03-04 | End: 2019-04-08 | Stop reason: SDUPTHER

## 2019-03-27 RX ORDER — SITAGLIPTIN AND METFORMIN HYDROCHLORIDE 500; 50 MG/1; MG/1
TABLET, FILM COATED ORAL
Qty: 60 TABLET | Refills: 0 | Status: SHIPPED | OUTPATIENT
Start: 2019-03-27 | End: 2019-04-28 | Stop reason: SDUPTHER

## 2019-03-29 ENCOUNTER — PROCEDURE VISIT (OUTPATIENT)
Dept: OPHTHALMOLOGY | Facility: CLINIC | Age: 79
End: 2019-03-29
Payer: MEDICARE

## 2019-03-29 VITALS — HEART RATE: 73 BPM | SYSTOLIC BLOOD PRESSURE: 166 MMHG | DIASTOLIC BLOOD PRESSURE: 72 MMHG

## 2019-03-29 DIAGNOSIS — H43.813 POSTERIOR VITREOUS DETACHMENT OF BOTH EYES: ICD-10-CM

## 2019-03-29 DIAGNOSIS — E11.3513 CONTROLLED TYPE 2 DIABETES MELLITUS WITH BOTH EYES AFFECTED BY PROLIFERATIVE RETINOPATHY AND MACULAR EDEMA, WITHOUT LONG-TERM CURRENT USE OF INSULIN: Primary | ICD-10-CM

## 2019-03-29 PROCEDURE — 92014 COMPRE OPH EXAM EST PT 1/>: CPT | Mod: 25,S$PBB,ICN, | Performed by: OPHTHALMOLOGY

## 2019-03-29 PROCEDURE — 67028 PR INJECT INTRAVITREAL PHARMCOLOGIC: ICD-10-PCS | Mod: 50,S$PBB,ICN, | Performed by: OPHTHALMOLOGY

## 2019-03-29 PROCEDURE — C9257 BEVACIZUMAB INJECTION: HCPCS | Mod: PBBFAC,JG | Performed by: OPHTHALMOLOGY

## 2019-03-29 PROCEDURE — 67028 INJECTION EYE DRUG: CPT | Mod: 50,S$PBB,ICN, | Performed by: OPHTHALMOLOGY

## 2019-03-29 PROCEDURE — 67028 INJECTION EYE DRUG: CPT | Mod: 50,PBBFAC | Performed by: OPHTHALMOLOGY

## 2019-03-29 PROCEDURE — 92014 PR EYE EXAM, EST PATIENT,COMPREHESV: ICD-10-PCS | Mod: 25,S$PBB,ICN, | Performed by: OPHTHALMOLOGY

## 2019-03-29 RX ADMIN — DEXAMETHASONE 0.7 MG: 0.7 IMPLANT INTRAVITREAL at 12:03

## 2019-03-29 RX ADMIN — BEVACIZUMAB 1.25 MG: 100 INJECTION, SOLUTION INTRAVENOUS at 11:03

## 2019-03-29 NOTE — PROGRESS NOTES
HPI     3 mo / OCT  Avastin   DLS-12/28/2018 Dr. Olsen     Pt sts no change noticed since last visit. Denies pain just itching OU.    (-)Flashes (-)Floaters  (-)Photophobia  (-)Glare    Systane PRN      OCT - DME OU - good focal OU  Slight increase OD    Prior FA shows good perfusion time OU, multiple late leakage points in macula OU, staining of peripheral laser scars OU, mild late leakage from nerve OS      A/P    1-PDR OS>OD:   - S/p PRP OD (06/16/15)   - S/p PRP OS (07/07/15)   - Last HbA1c was 6.4 on 4/8/15   - Emphasized the importance of tight glucose control    2-DME OU   - s/p focal OU   -  Increase in cystoid fluid OS  S/p resumed avastin OD x 4, OS x 10  3/18 - increased DME OD, stable OS  6/18 - q 3 month Avastin working OU      Ozurdex OD, Avastin OS today      3-PCIOL OU    4-Ectropion OU:  Saw Mary for repair  Incomplete closer with some moderate inf PEEs         2 months OCT      Risks, benefits, and alternatives to treatment discussed in detail with the patient.  The patient voiced understanding and wished to proceed with the procedure    Injection Procedure Note:  Diagnosis: DME OU    Patient Identified and Time Out complete  Topical Proparacaine and Betadine. subconj OD  Inject Ozurdex OD, Avastin OS at 6:00 @ 3.5-4mm posterior to limbus  Post Operative Dx: Same  Complications: None  Follow up as above.

## 2019-03-31 ENCOUNTER — EXTERNAL CHRONIC CARE MANAGEMENT (OUTPATIENT)
Dept: PRIMARY CARE CLINIC | Facility: CLINIC | Age: 79
End: 2019-03-31
Payer: MEDICARE

## 2019-03-31 PROCEDURE — 99490 CHRNC CARE MGMT STAFF 1ST 20: CPT | Mod: PBBFAC | Performed by: INTERNAL MEDICINE

## 2019-03-31 PROCEDURE — 99490 CHRNC CARE MGMT STAFF 1ST 20: CPT | Mod: S$PBB,,, | Performed by: INTERNAL MEDICINE

## 2019-03-31 PROCEDURE — 99490 PR CHRONIC CARE MGMT, 1ST 20 MIN: ICD-10-PCS | Mod: S$PBB,,, | Performed by: INTERNAL MEDICINE

## 2019-04-01 ENCOUNTER — LAB VISIT (OUTPATIENT)
Dept: LAB | Facility: HOSPITAL | Age: 79
End: 2019-04-01
Attending: INTERNAL MEDICINE
Payer: MEDICARE

## 2019-04-01 ENCOUNTER — OFFICE VISIT (OUTPATIENT)
Dept: RHEUMATOLOGY | Facility: CLINIC | Age: 79
End: 2019-04-01
Payer: MEDICARE

## 2019-04-01 VITALS
HEART RATE: 93 BPM | DIASTOLIC BLOOD PRESSURE: 80 MMHG | BODY MASS INDEX: 25.72 KG/M2 | HEIGHT: 62 IN | WEIGHT: 139.75 LBS | SYSTOLIC BLOOD PRESSURE: 173 MMHG

## 2019-04-01 DIAGNOSIS — M05.79 RHEUMATOID ARTHRITIS INVOLVING MULTIPLE SITES WITH POSITIVE RHEUMATOID FACTOR: Primary | ICD-10-CM

## 2019-04-01 DIAGNOSIS — M05.79 RHEUMATOID ARTHRITIS INVOLVING MULTIPLE SITES WITH POSITIVE RHEUMATOID FACTOR: ICD-10-CM

## 2019-04-01 DIAGNOSIS — Z79.899 HIGH RISK MEDICATION USE: Chronic | ICD-10-CM

## 2019-04-01 DIAGNOSIS — G70.00 MYASTHENIA GRAVIS STATUS POST THYMECTOMY: ICD-10-CM

## 2019-04-01 DIAGNOSIS — M81.0 SENILE OSTEOPOROSIS: ICD-10-CM

## 2019-04-01 DIAGNOSIS — Z90.89 MYASTHENIA GRAVIS STATUS POST THYMECTOMY: ICD-10-CM

## 2019-04-01 LAB
ALBUMIN SERPL BCP-MCNC: 4 G/DL (ref 3.5–5.2)
ALP SERPL-CCNC: 73 U/L (ref 55–135)
ALT SERPL W/O P-5'-P-CCNC: 48 U/L (ref 10–44)
ANION GAP SERPL CALC-SCNC: 13 MMOL/L (ref 8–16)
AST SERPL-CCNC: 40 U/L (ref 10–40)
BASOPHILS # BLD AUTO: 0.03 K/UL (ref 0–0.2)
BASOPHILS NFR BLD: 0.4 % (ref 0–1.9)
BILIRUB SERPL-MCNC: 0.6 MG/DL (ref 0.1–1)
BUN SERPL-MCNC: 11 MG/DL (ref 8–23)
CALCIUM SERPL-MCNC: 9.3 MG/DL (ref 8.7–10.5)
CHLORIDE SERPL-SCNC: 106 MMOL/L (ref 95–110)
CO2 SERPL-SCNC: 25 MMOL/L (ref 23–29)
CREAT SERPL-MCNC: 1.1 MG/DL (ref 0.5–1.4)
CRP SERPL-MCNC: 6.1 MG/L (ref 0–8.2)
DIFFERENTIAL METHOD: ABNORMAL
EOSINOPHIL # BLD AUTO: 0 K/UL (ref 0–0.5)
EOSINOPHIL NFR BLD: 0.2 % (ref 0–8)
ERYTHROCYTE [DISTWIDTH] IN BLOOD BY AUTOMATED COUNT: 13.8 % (ref 11.5–14.5)
ERYTHROCYTE [SEDIMENTATION RATE] IN BLOOD BY WESTERGREN METHOD: 36 MM/HR (ref 0–36)
EST. GFR  (AFRICAN AMERICAN): 55.2 ML/MIN/1.73 M^2
EST. GFR  (NON AFRICAN AMERICAN): 47.9 ML/MIN/1.73 M^2
GLUCOSE SERPL-MCNC: 195 MG/DL (ref 70–110)
HCT VFR BLD AUTO: 43.7 % (ref 37–48.5)
HGB BLD-MCNC: 13.4 G/DL (ref 12–16)
IMM GRANULOCYTES # BLD AUTO: 0.05 K/UL (ref 0–0.04)
IMM GRANULOCYTES NFR BLD AUTO: 0.6 % (ref 0–0.5)
LYMPHOCYTES # BLD AUTO: 0.6 K/UL (ref 1–4.8)
LYMPHOCYTES NFR BLD: 7.6 % (ref 18–48)
MCH RBC QN AUTO: 28.1 PG (ref 27–31)
MCHC RBC AUTO-ENTMCNC: 30.7 G/DL (ref 32–36)
MCV RBC AUTO: 92 FL (ref 82–98)
MONOCYTES # BLD AUTO: 0.1 K/UL (ref 0.3–1)
MONOCYTES NFR BLD: 1.1 % (ref 4–15)
NEUTROPHILS # BLD AUTO: 7.3 K/UL (ref 1.8–7.7)
NEUTROPHILS NFR BLD: 90.1 % (ref 38–73)
NRBC BLD-RTO: 0 /100 WBC
PLATELET # BLD AUTO: 295 K/UL (ref 150–350)
PMV BLD AUTO: 10.7 FL (ref 9.2–12.9)
POTASSIUM SERPL-SCNC: 4.4 MMOL/L (ref 3.5–5.1)
PROT SERPL-MCNC: 7.9 G/DL (ref 6–8.4)
RBC # BLD AUTO: 4.77 M/UL (ref 4–5.4)
SODIUM SERPL-SCNC: 144 MMOL/L (ref 136–145)
WBC # BLD AUTO: 8.13 K/UL (ref 3.9–12.7)

## 2019-04-01 PROCEDURE — 99214 PR OFFICE/OUTPT VISIT, EST, LEVL IV, 30-39 MIN: ICD-10-PCS | Mod: S$PBB,,, | Performed by: INTERNAL MEDICINE

## 2019-04-01 PROCEDURE — 80053 COMPREHEN METABOLIC PANEL: CPT

## 2019-04-01 PROCEDURE — 86140 C-REACTIVE PROTEIN: CPT

## 2019-04-01 PROCEDURE — 85652 RBC SED RATE AUTOMATED: CPT

## 2019-04-01 PROCEDURE — 99213 OFFICE O/P EST LOW 20 MIN: CPT | Mod: PBBFAC | Performed by: INTERNAL MEDICINE

## 2019-04-01 PROCEDURE — 99999 PR PBB SHADOW E&M-EST. PATIENT-LVL III: CPT | Mod: PBBFAC,,, | Performed by: INTERNAL MEDICINE

## 2019-04-01 PROCEDURE — 36415 COLL VENOUS BLD VENIPUNCTURE: CPT

## 2019-04-01 PROCEDURE — 99999 PR PBB SHADOW E&M-EST. PATIENT-LVL III: ICD-10-PCS | Mod: PBBFAC,,, | Performed by: INTERNAL MEDICINE

## 2019-04-01 PROCEDURE — 85025 COMPLETE CBC W/AUTO DIFF WBC: CPT

## 2019-04-01 PROCEDURE — 99214 OFFICE O/P EST MOD 30 MIN: CPT | Mod: S$PBB,,, | Performed by: INTERNAL MEDICINE

## 2019-04-01 RX ORDER — METHOTREXATE 2.5 MG/1
7.5 TABLET ORAL
Qty: 15 TABLET | Refills: 2 | Status: SHIPPED | OUTPATIENT
Start: 2019-04-01 | End: 2019-08-07 | Stop reason: SDUPTHER

## 2019-04-01 NOTE — TELEPHONE ENCOUNTER
----- Message from Chata Wagner sent at 4/1/2019 10:27 AM CDT -----  Contact:   Patient   [phone]  Needs Advice    Reason for call:  Refill on ONETOUCH ULTRA TEST) Strp  ,  Call back to verify . [facility]         Communication Preference:    Additional Information:     [facility] 
[address] [address] 
[phone] Work/School Note Date: 3/21/2017 To Whom It May concern: 
 
Dari Carrillo was seen and treated today in the urgent care center by the following provider(s): 
Physician Assistant: Zack Todd. Dari Carrillo please excuse from work 3/21-3/24/2017.  
 
Sincerely, 
 
 
 
 
Drucilla Lesch, RN

## 2019-04-01 NOTE — PATIENT INSTRUCTIONS

## 2019-04-01 NOTE — ASSESSMENT & PLAN NOTE
RA without active synovitis on exam  Reduction in dose from 10 to 7.5 mg/week has not led to increased synovitis and previously elevated liver enzymes are now normal  Will get lab today and cont MTX 7.5 mg/week

## 2019-04-01 NOTE — PROGRESS NOTES
"Subjective:       Patient ID: Teresita Tolbert is a 78 y.o. female.    Chief Complaint: Disease Management    F/u RA, seropos on MTX 10 mg/week since May 2015  F/u osteoporosis with lumbar compressions   Prolia since 2016     Had prolia Feb 25, 2019     PMH: osteoporosis; was on alendronate , switched to Prolia q6m  PMH: myasthenia gravis on prednisone and previously azathioprine until started MTX for RA        Currently MTX 7.5 mg/ week  Prednisone 5 mg/d    Methotrexate reduced to 7.5 due to elevated liver enzymes    Still has pain in low back  Sometimes pain in side; severe at times    Review of Systems      Objective:   BP (!) 173/80   Pulse 93   Ht 5' 2" (1.575 m)   Wt 63.4 kg (139 lb 12.4 oz)   BMI 25.56 kg/m²      Physical Exam   Musculoskeletal:   Ulnar deviation L 3-5 fingers           Physical Exam     Tenderness:   RUE: glenohumeral  LUE: glenohumeral    ARREDONDO-28 tender joint count: 2  ARREDONDO-28 swollen joint count: 0    Lab Results   Component Value Date    SEDRATE 17 12/19/2018     Lab Results   Component Value Date    WBC 7.98 12/19/2018    HGB 12.6 12/19/2018    HCT 42.4 12/19/2018    MCV 92 12/19/2018     12/19/2018      Lab Results   Component Value Date    ALT 41 01/17/2019       Assessment:       1. Rheumatoid arthritis involving multiple sites with positive rheumatoid factor    2. Senile osteoporosis    3. High risk medication use    4. Myasthenia gravis status post thymectomy            Plan:       Problem List Items Addressed This Visit        Active Problems    Rheumatoid arthritis involving multiple sites with positive rheumatoid factor - Primary     RA without active synovitis on exam  Reduction in dose from 10 to 7.5 mg/week has not led to increased synovitis and previously elevated liver enzymes are now normal  Will get lab today and cont MTX 7.5 mg/week         Relevant Medications    methotrexate 2.5 MG Tab    Senile osteoporosis     Tolerating Prolia         High risk medication use " (Chronic)     Needs lab today; had elevated transaminase that improved after reduction in dose of MTX         Myasthenia gravis status post thymectomy     I will CC Dr Lyles as she needs neuro f/u

## 2019-04-03 ENCOUNTER — PATIENT MESSAGE (OUTPATIENT)
Dept: ENDOCRINOLOGY | Facility: CLINIC | Age: 79
End: 2019-04-03

## 2019-04-08 RX ORDER — POTASSIUM CHLORIDE 750 MG/1
TABLET, EXTENDED RELEASE ORAL
Qty: 30 TABLET | Refills: 0 | Status: SHIPPED | OUTPATIENT
Start: 2019-04-08 | End: 2019-05-08 | Stop reason: SDUPTHER

## 2019-04-23 ENCOUNTER — PES CALL (OUTPATIENT)
Dept: ADMINISTRATIVE | Facility: CLINIC | Age: 79
End: 2019-04-23

## 2019-04-30 ENCOUNTER — EXTERNAL CHRONIC CARE MANAGEMENT (OUTPATIENT)
Dept: PRIMARY CARE CLINIC | Facility: CLINIC | Age: 79
End: 2019-04-30
Payer: MEDICARE

## 2019-04-30 PROCEDURE — 99490 PR CHRONIC CARE MGMT, 1ST 20 MIN: ICD-10-PCS | Mod: S$PBB,,, | Performed by: INTERNAL MEDICINE

## 2019-04-30 PROCEDURE — 99490 CHRNC CARE MGMT STAFF 1ST 20: CPT | Mod: PBBFAC | Performed by: INTERNAL MEDICINE

## 2019-04-30 PROCEDURE — 99490 CHRNC CARE MGMT STAFF 1ST 20: CPT | Mod: S$PBB,,, | Performed by: INTERNAL MEDICINE

## 2019-04-30 RX ORDER — SITAGLIPTIN AND METFORMIN HYDROCHLORIDE 500; 50 MG/1; MG/1
TABLET, FILM COATED ORAL
Qty: 60 TABLET | Refills: 0 | Status: SHIPPED | OUTPATIENT
Start: 2019-04-30 | End: 2019-05-28 | Stop reason: SDUPTHER

## 2019-05-08 DIAGNOSIS — M05.9 SEROPOSITIVE RHEUMATOID ARTHRITIS: ICD-10-CM

## 2019-05-08 RX ORDER — FOLIC ACID 1 MG/1
TABLET ORAL
Qty: 90 TABLET | Refills: 0 | Status: SHIPPED | OUTPATIENT
Start: 2019-05-08 | End: 2019-08-07 | Stop reason: SDUPTHER

## 2019-05-08 RX ORDER — POTASSIUM CHLORIDE 750 MG/1
TABLET, EXTENDED RELEASE ORAL
Qty: 30 TABLET | Refills: 0 | Status: SHIPPED | OUTPATIENT
Start: 2019-05-08 | End: 2019-06-09 | Stop reason: SDUPTHER

## 2019-05-28 ENCOUNTER — OFFICE VISIT (OUTPATIENT)
Dept: NEUROLOGY | Facility: CLINIC | Age: 79
End: 2019-05-28
Payer: MEDICARE

## 2019-05-28 VITALS
BODY MASS INDEX: 27.24 KG/M2 | WEIGHT: 135.13 LBS | SYSTOLIC BLOOD PRESSURE: 154 MMHG | HEART RATE: 78 BPM | DIASTOLIC BLOOD PRESSURE: 77 MMHG | HEIGHT: 59 IN

## 2019-05-28 DIAGNOSIS — E11.65 UNCONTROLLED TYPE 2 DIABETES MELLITUS WITH HYPERGLYCEMIA: Primary | ICD-10-CM

## 2019-05-28 DIAGNOSIS — Z90.89 MYASTHENIA GRAVIS STATUS POST THYMECTOMY: Primary | ICD-10-CM

## 2019-05-28 DIAGNOSIS — G70.00 MYASTHENIA GRAVIS STATUS POST THYMECTOMY: Primary | ICD-10-CM

## 2019-05-28 DIAGNOSIS — M05.79 RHEUMATOID ARTHRITIS INVOLVING MULTIPLE SITES WITH POSITIVE RHEUMATOID FACTOR: ICD-10-CM

## 2019-05-28 PROCEDURE — 99214 PR OFFICE/OUTPT VISIT, EST, LEVL IV, 30-39 MIN: ICD-10-PCS | Mod: S$PBB,GC,, | Performed by: STUDENT IN AN ORGANIZED HEALTH CARE EDUCATION/TRAINING PROGRAM

## 2019-05-28 PROCEDURE — 99213 OFFICE O/P EST LOW 20 MIN: CPT | Mod: PBBFAC | Performed by: STUDENT IN AN ORGANIZED HEALTH CARE EDUCATION/TRAINING PROGRAM

## 2019-05-28 PROCEDURE — 99999 PR PBB SHADOW E&M-EST. PATIENT-LVL III: ICD-10-PCS | Mod: PBBFAC,GC,, | Performed by: STUDENT IN AN ORGANIZED HEALTH CARE EDUCATION/TRAINING PROGRAM

## 2019-05-28 PROCEDURE — 99214 OFFICE O/P EST MOD 30 MIN: CPT | Mod: S$PBB,GC,, | Performed by: STUDENT IN AN ORGANIZED HEALTH CARE EDUCATION/TRAINING PROGRAM

## 2019-05-28 PROCEDURE — 99999 PR PBB SHADOW E&M-EST. PATIENT-LVL III: CPT | Mod: PBBFAC,GC,, | Performed by: STUDENT IN AN ORGANIZED HEALTH CARE EDUCATION/TRAINING PROGRAM

## 2019-05-28 NOTE — PROGRESS NOTES
"Patient Name: Teresita Tolbert  MRN: [id number]    CC: Myasthenia gravis    HPI: Teresita Tolbert is a 78 y.o. female with Myasthenia gravis, previously followed by Dr. Duran, Dr. Georges and Dr. Morse.    Diagnosed: 1989, bulbar predominate  Readmission: multiple, unknown dates  Antibody status: MUSK+  Thymoma: s/p thymectomy  Medications: Prednisone 10mg every other, MTX 10mg/wk (previously imuran 100mg per day)  Acute treatments: ?PLEX  Intubated: never    Today she is accompanied by her son, and they both report that she is stable. She says she has to be "careful" every day with swallowing, occasionally chokes/coughs with liquids or solids but not often. She always has droopy eyelids bilaterally (worse with fatigue), always feels short of breath with exertion, feels weak all over. She denies double vision. Since diagnosis she has had these symptoms, and she's never returned to baseline despite all treatments. She has tried mestinon in the past, but is unsure if it was helpful.    She is prescribed MTX by Dr. Squires for RA, which he decreased from 10mg/week to 7.5mg/week at the last visit.      ROS:   Review of Systems   Constitutional: +malaise/fatigue. Negative for weight loss.   HENT: Negative for hearing loss.   Eyes: Negative for blurred vision and double vision.   Respiratory: +shortness of breath.   Cardiovascular: Negative for chest pain and palpitations.   Gastrointestinal: Negative for nausea, vomiting and constipation. +diarrhea  Genitourinary: Negative for frequency. Negative for urgency.   Musculoskeletal: Negative for joint pain. Negative for myalgias and falls.   Skin: Negative for rash.   Neurological: Negative for dizziness and tremors. Negative for focal weakness and seizures.   Endo/Heme/Allergies: Does not bruise/bleed easily.   Psychiatric/Behavioral: Negative for memory loss. Negative for depression and hallucinations. The patient is not nervous/anxious.      Past Medical History  Past Medical " History:   Diagnosis Date    Anemia     Arthritis of hand 2/1/2013    Cataract     Colon polyps     Diabetes mellitus     Diabetic retinopathy     Encounter for blood transfusion     GERD (gastroesophageal reflux disease)     HTN (hypertension) 8/21/2013    Hypertension     Myasthenia gravis     Osteoporosis     Type II or unspecified type diabetes mellitus without mention of complication, uncontrolled 8/21/2013       Medications    Current Outpatient Medications:     amLODIPine (NORVASC) 5 MG tablet, TAKE 1 TABLET(5 MG) BY MOUTH EVERY DAY, Disp: 30 tablet, Rfl: 3    aspirin (ECOTRIN) 81 MG EC tablet, Take 81 mg by mouth once daily.  , Disp: , Rfl:     bismuth subsalicylate (PEPTO BISMOL) 262 mg/15 mL suspension, Take 15 mLs by mouth every 6 (six) hours as needed., Disp: , Rfl:     blood glucose strip-disp meter Kit, Insurance preferred, Disp: 1 kit, Rfl: 0    blood sugar diagnostic (ONETOUCH ULTRA TEST) Strp, Inject 1 strip into the skin 2 times daily 2 hours after meal. * * * As directed.  test sugars 2 times daily, Disp: 300 strip, Rfl: 12    blood sugar diagnostic Strp, Insurance preferred; use as directed 1 strip 2 times daily, Disp: 300 each, Rfl: 4    cholecalciferol, vitamin D3, (VITAMIN D3) 1,000 unit capsule, Take 2 capsules (2,000 Units total) by mouth once daily., Disp: 100 capsule, Rfl: 12    ferrous sulfate 325 mg (65 mg iron) Tab tablet, TAKE 1 TABLET(325 MG) BY MOUTH DAILY WITH BREAKFAST, Disp: 30 tablet, Rfl: 0    folic acid (FOLVITE) 1 MG tablet, TAKE 1 TABLET BY MOUTH EVERY DAY, Disp: 90 tablet, Rfl: 0    JANUMET  mg per tablet, TAKE 1 TABLET BY MOUTH TWICE DAILY, Disp: 60 tablet, Rfl: 0    methotrexate 2.5 MG Tab, Take 3 tablets (7.5 mg total) by mouth every 7 days., Disp: 15 tablet, Rfl: 2    metoprolol succinate (TOPROL-XL) 50 MG 24 hr tablet, TAKE 1 TABLET(50 MG) BY MOUTH EVERY DAY, Disp: 30 tablet, Rfl: 3    MINERAL OIL/PETROLATUM,WHITE (SOOTHE NIGHT TIME  LUBRICANT OPHT), Apply to eye., Disp: , Rfl:     multivitamin capsule, Take 1 capsule by mouth once daily.  , Disp: , Rfl:     omeprazole (PRILOSEC) 20 MG capsule, Take 20 mg by mouth once daily., Disp: , Rfl:     potassium chloride (KLOR-CON) 10 MEQ TbSR, TAKE 1 TABLET(10 MEQ) BY MOUTH EVERY DAY, Disp: 30 tablet, Rfl: 0    predniSONE (DELTASONE) 5 MG tablet, TAKE 2 TABLETS BY MOUTH EVERY OTHER DAY, Disp: 90 tablet, Rfl: 3    Current Facility-Administered Medications:     denosumab (PROLIA) injection 60 mg, 60 mg, Subcutaneous, Q6 Months, Gene Mosher II, MD, 60 mg at 17 1400    Allergies  Review of patient's allergies indicates:   Allergen Reactions    Erythromycin Shortness Of Breath     All mycins per patient  -affects myasthenia gravis    Sulfa (sulfonamide antibiotics)        Social History  Social History     Socioeconomic History    Marital status:      Spouse name: Not on file    Number of children: Not on file    Years of education: Not on file    Highest education level: Not on file   Occupational History    Not on file   Social Needs    Financial resource strain: Not on file    Food insecurity:     Worry: Not on file     Inability: Not on file    Transportation needs:     Medical: Not on file     Non-medical: Not on file   Tobacco Use    Smoking status: Former Smoker     Types: Cigarettes     Last attempt to quit:      Years since quittin.4    Smokeless tobacco: Never Used   Substance and Sexual Activity    Alcohol use: No    Drug use: No    Sexual activity: Not on file   Lifestyle    Physical activity:     Days per week: Not on file     Minutes per session: Not on file    Stress: Not on file   Relationships    Social connections:     Talks on phone: Not on file     Gets together: Not on file     Attends Sikh service: Not on file     Active member of club or organization: Not on file     Attends meetings of clubs or organizations: Not on file      "Relationship status: Not on file   Other Topics Concern    Not on file   Social History Narrative    Not on file       Family History  Family History   Problem Relation Age of Onset    Diabetes Mother     Celiac disease Neg Hx     Colon polyps Neg Hx     Colon cancer Neg Hx     Esophageal cancer Neg Hx     Inflammatory bowel disease Neg Hx     Irritable bowel syndrome Neg Hx     Stomach cancer Neg Hx        Physical Exam  BP (!) 154/77   Pulse 78   Ht 4' 11" (1.499 m)   Wt 61.3 kg (135 lb 2.3 oz)   BMI 27.30 kg/m²       Constitutional  Well-developed, well-nourished, appears stated age   Neurological    * Mental status      - Orientation  Oriented to person, place, time, and situation     - Attention/concentration  Attentive, vigilant during exam     - Executive  Well-organized thoughts     - Other     * Cranial nerves       - CN II  PERRL     - CN III, IV, VI  Mildly limited leftward gaze bilaterally, normal pursuits and saccades     - CN V  Sensation V1 - V3 intact     - CN VII  Bilateral orbicularis oculi weakness, bilateral ptosis, unable to raise eyebrows bilaterally. Bilateral buccal weakness, weak tongue movements. Speaks with tongue protruding     - CN VIII  Hearing intact bilaterally     - CN IX, X  Palate raises midline and symmetric     - CN XI  SCM and trapezius 5/5 bilaterally     - CN XII  Tongue midline   * Motor  Muscle bulk normal, strength 5/5 throughout   * Sensory   Intact to touch throughout   * Coordination  No dysmetria with finger-to-nose    * Gait  Walks with cane, stooped   * Deep tendon reflexes  2+ and symmetric throughout   Babinski mute bilaterally       Lab and Test Results  Lab Results   Component Value Date    HGBA1C 7.9 (H) 01/23/2019     Lab Results   Component Value Date    TSH 1.717 03/24/2017    FREET4 1.09 04/01/2011         Images:   DEXA 10/12/18  Osteoporosis      Assessment and Plan    Teresita Tolbert is a 78 y.o. female with Myasthenia gravis. We discussed " adding mestinon for better symptom control, but she would not like to risk the side effects (diarrhea). She would like to keep her medications stable, so we will not change her regimen at this time. Has appointment with Dr. Lyles in September for follow up.      Problem List Items Addressed This Visit        Neuro    Myasthenia gravis status post thymectomy - Primary    Overview     Dx 1989 s/p thymectomy. MUSK+           Current Assessment & Plan     -- continue prednisone 10mg every other day  -- continue methotrexate per Dr. Squires  -- can consider mestinon if patient changes her mind regarding risk of side effects            Orthopedic    Rheumatoid arthritis involving multiple sites with positive rheumatoid factor    Overview     L wrist synovitis noted Feb 2013  RF +, CCP -  Ulnar deviation L 3,4,5 noted Nov 2013  AZAthioprine 2013-15   Methotrexate May 2015         Current Assessment & Plan     -- follows with Dr. Shaw Araujo MD  Neurology Resident   [facility]  [address] [place] [address]  Pager: [phone]

## 2019-05-29 RX ORDER — SITAGLIPTIN AND METFORMIN HYDROCHLORIDE 500; 50 MG/1; MG/1
TABLET, FILM COATED ORAL
Qty: 60 TABLET | Refills: 5 | Status: SHIPPED | OUTPATIENT
Start: 2019-05-29 | End: 2019-11-29 | Stop reason: SDUPTHER

## 2019-05-29 NOTE — ASSESSMENT & PLAN NOTE
-- continue prednisone 10mg every other day  -- continue methotrexate per Dr. Squires  -- can consider mestinon if patient changes her mind regarding risk of side effects

## 2019-05-31 ENCOUNTER — EXTERNAL CHRONIC CARE MANAGEMENT (OUTPATIENT)
Dept: PRIMARY CARE CLINIC | Facility: CLINIC | Age: 79
End: 2019-05-31
Payer: MEDICARE

## 2019-05-31 ENCOUNTER — PROCEDURE VISIT (OUTPATIENT)
Dept: OPHTHALMOLOGY | Facility: CLINIC | Age: 79
End: 2019-05-31
Payer: MEDICARE

## 2019-05-31 VITALS — SYSTOLIC BLOOD PRESSURE: 159 MMHG | HEART RATE: 92 BPM | DIASTOLIC BLOOD PRESSURE: 79 MMHG

## 2019-05-31 DIAGNOSIS — H35.373 EPIRETINAL MEMBRANE (ERM) OF BOTH EYES: ICD-10-CM

## 2019-05-31 DIAGNOSIS — H43.813 POSTERIOR VITREOUS DETACHMENT OF BOTH EYES: ICD-10-CM

## 2019-05-31 DIAGNOSIS — E11.3513 CONTROLLED TYPE 2 DIABETES MELLITUS WITH BOTH EYES AFFECTED BY PROLIFERATIVE RETINOPATHY AND MACULAR EDEMA, WITHOUT LONG-TERM CURRENT USE OF INSULIN: Primary | ICD-10-CM

## 2019-05-31 PROCEDURE — 99490 CHRNC CARE MGMT STAFF 1ST 20: CPT | Mod: S$PBB,,, | Performed by: INTERNAL MEDICINE

## 2019-05-31 PROCEDURE — 92134 CPTRZ OPH DX IMG PST SGM RTA: CPT | Mod: PBBFAC | Performed by: OPHTHALMOLOGY

## 2019-05-31 PROCEDURE — 92226 PR SPECIAL EYE EXAM, SUBSEQUENT: CPT | Mod: 50,PBBFAC | Performed by: OPHTHALMOLOGY

## 2019-05-31 PROCEDURE — 92014 COMPRE OPH EXAM EST PT 1/>: CPT | Mod: S$PBB,,, | Performed by: OPHTHALMOLOGY

## 2019-05-31 PROCEDURE — 92226 PR SPECIAL EYE EXAM, SUBSEQUENT: ICD-10-PCS | Mod: 50,S$PBB,, | Performed by: OPHTHALMOLOGY

## 2019-05-31 PROCEDURE — 92014 PR EYE EXAM, EST PATIENT,COMPREHESV: ICD-10-PCS | Mod: S$PBB,,, | Performed by: OPHTHALMOLOGY

## 2019-05-31 PROCEDURE — 99490 PR CHRONIC CARE MGMT, 1ST 20 MIN: ICD-10-PCS | Mod: S$PBB,,, | Performed by: INTERNAL MEDICINE

## 2019-05-31 PROCEDURE — 99490 CHRNC CARE MGMT STAFF 1ST 20: CPT | Mod: PBBFAC | Performed by: INTERNAL MEDICINE

## 2019-05-31 PROCEDURE — 92134 POSTERIOR SEGMENT OCT RETINA (OCULAR COHERENCE TOMOGRAPHY)-BOTH EYES: ICD-10-PCS | Mod: 26,S$PBB,, | Performed by: OPHTHALMOLOGY

## 2019-05-31 PROCEDURE — 92226 PR SPECIAL EYE EXAM, SUBSEQUENT: CPT | Mod: 50,S$PBB,, | Performed by: OPHTHALMOLOGY

## 2019-05-31 NOTE — PROGRESS NOTES
HPI     Diabetic Eye Exam      Additional comments: dm mac edema              Comments      OCT  Avastin   DLS-3-29-19 Dr. Olsen     No noticeable vision changes  +dry eyes  No flashes or floaters    Systane ou qid    HPI     3 mo / OCT  Avastin   DLS-12/28/2018 Dr. Olsen     Pt sts no change noticed since last visit. Denies pain just itching OU.    (-)Flashes (-)Floaters  (-)Photophobia  (-)Glare    Systane PRN      OCT - DME OU - good focal OU  Slight increase OD    Prior FA shows good perfusion time OU, multiple late leakage points in macula OU, staining of peripheral laser scars OU, mild late leakage from nerve OS      A/P    1-PDR OS>OD:   - S/p PRP OD (06/16/15)   - S/p PRP OS (07/07/15)   - Last HbA1c was 6.4 on 4/8/15   - Emphasized the importance of tight glucose control    2-DME OU   - s/p focal OU   -  Increase in cystoid fluid OS  S/p resumed avastin OD x 4, OS x 11  S/p Ozurdex OD x 1 3/29/19  3/18 - increased DME OD, stable OS  6/18 - q 3 month Avastin working OU      Only mild IOP elevation OD today after ozurdex, monitor  Watch OS as well   Consider Ozurdex OU if recurs - can add Cosopt as well if IOP elevated      3-PCIOL OU    4-Ectropion OU:  Saw Mary for repair  Incomplete closer with some moderate inf PEEs         2 months OCT

## 2019-06-09 RX ORDER — POTASSIUM CHLORIDE 750 MG/1
TABLET, EXTENDED RELEASE ORAL
Qty: 30 TABLET | Refills: 0 | Status: SHIPPED | OUTPATIENT
Start: 2019-06-09 | End: 2019-07-08 | Stop reason: SDUPTHER

## 2019-06-14 RX ORDER — AMLODIPINE BESYLATE 5 MG/1
TABLET ORAL
Qty: 30 TABLET | Refills: 0 | Status: SHIPPED | OUTPATIENT
Start: 2019-06-14 | End: 2019-07-11 | Stop reason: SDUPTHER

## 2019-06-14 RX ORDER — METOPROLOL SUCCINATE 50 MG/1
TABLET, EXTENDED RELEASE ORAL
Qty: 30 TABLET | Refills: 0 | Status: SHIPPED | OUTPATIENT
Start: 2019-06-14 | End: 2019-07-11 | Stop reason: SDUPTHER

## 2019-07-01 ENCOUNTER — LAB VISIT (OUTPATIENT)
Dept: LAB | Facility: HOSPITAL | Age: 79
End: 2019-07-01
Attending: INTERNAL MEDICINE
Payer: MEDICARE

## 2019-07-01 DIAGNOSIS — M05.79 RHEUMATOID ARTHRITIS INVOLVING MULTIPLE SITES WITH POSITIVE RHEUMATOID FACTOR: ICD-10-CM

## 2019-07-01 DIAGNOSIS — Z79.899 HIGH RISK MEDICATION USE: Chronic | ICD-10-CM

## 2019-07-01 LAB
ALBUMIN SERPL BCP-MCNC: 4 G/DL (ref 3.5–5.2)
ALP SERPL-CCNC: 49 U/L (ref 55–135)
ALT SERPL W/O P-5'-P-CCNC: 27 U/L (ref 10–44)
ANION GAP SERPL CALC-SCNC: 9 MMOL/L (ref 8–16)
AST SERPL-CCNC: 22 U/L (ref 10–40)
BASOPHILS # BLD AUTO: 0.02 K/UL (ref 0–0.2)
BASOPHILS NFR BLD: 0.2 % (ref 0–1.9)
BILIRUB SERPL-MCNC: 0.9 MG/DL (ref 0.1–1)
BUN SERPL-MCNC: 13 MG/DL (ref 8–23)
CALCIUM SERPL-MCNC: 10 MG/DL (ref 8.7–10.5)
CHLORIDE SERPL-SCNC: 104 MMOL/L (ref 95–110)
CO2 SERPL-SCNC: 32 MMOL/L (ref 23–29)
CREAT SERPL-MCNC: 0.9 MG/DL (ref 0.5–1.4)
CRP SERPL-MCNC: 1.8 MG/L (ref 0–8.2)
DIFFERENTIAL METHOD: ABNORMAL
EOSINOPHIL # BLD AUTO: 0.4 K/UL (ref 0–0.5)
EOSINOPHIL NFR BLD: 3.9 % (ref 0–8)
ERYTHROCYTE [DISTWIDTH] IN BLOOD BY AUTOMATED COUNT: 14.4 % (ref 11.5–14.5)
ERYTHROCYTE [SEDIMENTATION RATE] IN BLOOD BY WESTERGREN METHOD: 11 MM/HR (ref 0–36)
EST. GFR  (AFRICAN AMERICAN): >60 ML/MIN/1.73 M^2
EST. GFR  (NON AFRICAN AMERICAN): >60 ML/MIN/1.73 M^2
GLUCOSE SERPL-MCNC: 145 MG/DL (ref 70–110)
HCT VFR BLD AUTO: 40.9 % (ref 37–48.5)
HGB BLD-MCNC: 12.3 G/DL (ref 12–16)
LYMPHOCYTES # BLD AUTO: 1.9 K/UL (ref 1–4.8)
LYMPHOCYTES NFR BLD: 21.1 % (ref 18–48)
MCH RBC QN AUTO: 27.8 PG (ref 27–31)
MCHC RBC AUTO-ENTMCNC: 30.1 G/DL (ref 32–36)
MCV RBC AUTO: 93 FL (ref 82–98)
MONOCYTES # BLD AUTO: 0.7 K/UL (ref 0.3–1)
MONOCYTES NFR BLD: 7.7 % (ref 4–15)
NEUTROPHILS # BLD AUTO: 5.9 K/UL (ref 1.8–7.7)
NEUTROPHILS NFR BLD: 67.1 % (ref 38–73)
PLATELET # BLD AUTO: 267 K/UL (ref 150–350)
PMV BLD AUTO: 10.4 FL (ref 9.2–12.9)
POTASSIUM SERPL-SCNC: 4 MMOL/L (ref 3.5–5.1)
PROT SERPL-MCNC: 7.2 G/DL (ref 6–8.4)
RBC # BLD AUTO: 4.42 M/UL (ref 4–5.4)
SODIUM SERPL-SCNC: 145 MMOL/L (ref 136–145)
WBC # BLD AUTO: 8.87 K/UL (ref 3.9–12.7)

## 2019-07-01 PROCEDURE — 80053 COMPREHEN METABOLIC PANEL: CPT

## 2019-07-01 PROCEDURE — 85025 COMPLETE CBC W/AUTO DIFF WBC: CPT

## 2019-07-01 PROCEDURE — 36415 COLL VENOUS BLD VENIPUNCTURE: CPT

## 2019-07-01 PROCEDURE — 85652 RBC SED RATE AUTOMATED: CPT

## 2019-07-01 PROCEDURE — 86140 C-REACTIVE PROTEIN: CPT

## 2019-07-08 RX ORDER — POTASSIUM CHLORIDE 750 MG/1
TABLET, EXTENDED RELEASE ORAL
Qty: 30 TABLET | Refills: 0 | Status: SHIPPED | OUTPATIENT
Start: 2019-07-08 | End: 2019-08-07 | Stop reason: SDUPTHER

## 2019-07-11 RX ORDER — AMLODIPINE BESYLATE 5 MG/1
TABLET ORAL
Qty: 30 TABLET | Refills: 3 | Status: SHIPPED | OUTPATIENT
Start: 2019-07-11 | End: 2019-11-13 | Stop reason: SDUPTHER

## 2019-07-12 RX ORDER — METOPROLOL SUCCINATE 50 MG/1
TABLET, EXTENDED RELEASE ORAL
Qty: 30 TABLET | Refills: 0 | Status: SHIPPED | OUTPATIENT
Start: 2019-07-12 | End: 2019-08-15 | Stop reason: SDUPTHER

## 2019-07-15 RX ORDER — LANCETS 33 GAUGE
EACH MISCELLANEOUS
Qty: 100 EACH | Refills: 3 | Status: SHIPPED | OUTPATIENT
Start: 2019-07-15 | End: 2021-04-05 | Stop reason: SDUPTHER

## 2019-07-19 ENCOUNTER — PATIENT MESSAGE (OUTPATIENT)
Dept: GASTROENTEROLOGY | Facility: CLINIC | Age: 79
End: 2019-07-19

## 2019-07-19 ENCOUNTER — PATIENT MESSAGE (OUTPATIENT)
Dept: INTERNAL MEDICINE | Facility: CLINIC | Age: 79
End: 2019-07-19

## 2019-07-19 NOTE — TELEPHONE ENCOUNTER
Paul stated everything is filled. I tried to call pt to find out what exactly needs a refill, so I can figure out what form needs to be filled out. Pt did not answer, left voicemail to return call. Message on [facility] to give us a call also.

## 2019-07-22 ENCOUNTER — TELEPHONE (OUTPATIENT)
Dept: INTERNAL MEDICINE | Facility: CLINIC | Age: 79
End: 2019-07-22

## 2019-07-22 NOTE — TELEPHONE ENCOUNTER
Thanks for the note Alondra. Nilay Lane and Dionne are 2 of our Endocrine doctors.     I will complete the form and see if that helps.

## 2019-07-22 NOTE — TELEPHONE ENCOUNTER
I have been speaking to the pt son in regards to his mom blood sugar strips.  has sent over the strips and the pt does not know who that it is. I have received a fax to fill out to approve the strips but another Dr,  is on the paper. Paper is in your folder. Pt son would prefer you to be in charge of his mom blood sugar strips   Please Advise

## 2019-07-24 ENCOUNTER — TELEPHONE (OUTPATIENT)
Dept: ENDOCRINOLOGY | Facility: CLINIC | Age: 79
End: 2019-07-24

## 2019-07-24 NOTE — TELEPHONE ENCOUNTER
----- Message from Hedy Watkins sent at 7/24/2019  2:42 PM CDT -----  Contact: Benjamin / Paul [phone]  .Medication question / problems.  Pharmacy called to check status of a CMN form for diabetic supplies. Benjamin is requesting a call at [phone].   [facility]   ..  [id number] - [address] AT [address] [address]  Phone: [phone] Fax: [phone]

## 2019-07-26 ENCOUNTER — PROCEDURE VISIT (OUTPATIENT)
Dept: OPHTHALMOLOGY | Facility: CLINIC | Age: 79
End: 2019-07-26
Payer: MEDICARE

## 2019-07-26 VITALS — DIASTOLIC BLOOD PRESSURE: 73 MMHG | SYSTOLIC BLOOD PRESSURE: 157 MMHG

## 2019-07-26 DIAGNOSIS — H43.813 POSTERIOR VITREOUS DETACHMENT OF BOTH EYES: ICD-10-CM

## 2019-07-26 DIAGNOSIS — E11.3513 CONTROLLED TYPE 2 DIABETES MELLITUS WITH BOTH EYES AFFECTED BY PROLIFERATIVE RETINOPATHY AND MACULAR EDEMA, WITHOUT LONG-TERM CURRENT USE OF INSULIN: Primary | ICD-10-CM

## 2019-07-26 PROCEDURE — 92014 COMPRE OPH EXAM EST PT 1/>: CPT | Mod: S$PBB,,, | Performed by: OPHTHALMOLOGY

## 2019-07-26 PROCEDURE — 92226 PR SPECIAL EYE EXAM, SUBSEQUENT: CPT | Mod: 50,S$PBB,, | Performed by: OPHTHALMOLOGY

## 2019-07-26 PROCEDURE — 92226 PR SPECIAL EYE EXAM, SUBSEQUENT: ICD-10-PCS | Mod: 50,S$PBB,, | Performed by: OPHTHALMOLOGY

## 2019-07-26 PROCEDURE — 92014 PR EYE EXAM, EST PATIENT,COMPREHESV: ICD-10-PCS | Mod: S$PBB,,, | Performed by: OPHTHALMOLOGY

## 2019-07-26 PROCEDURE — 92226 PR SPECIAL EYE EXAM, SUBSEQUENT: CPT | Mod: 50,PBBFAC | Performed by: OPHTHALMOLOGY

## 2019-07-26 PROCEDURE — 92134 CPTRZ OPH DX IMG PST SGM RTA: CPT | Mod: PBBFAC | Performed by: OPHTHALMOLOGY

## 2019-07-26 PROCEDURE — 92134 POSTERIOR SEGMENT OCT RETINA (OCULAR COHERENCE TOMOGRAPHY)-BOTH EYES: ICD-10-PCS | Mod: 26,S$PBB,, | Performed by: OPHTHALMOLOGY

## 2019-07-26 NOTE — PROGRESS NOTES
HPI     2 mo oct  DLS-05/31/2019 Dr. Olsen     Pt sts declining va OU denies pain   - f/f    Systane ou qid        OCT - DME OU - good focal OU  Stable from prior    Prior FA shows good perfusion time OU, multiple late leakage points in macula OU, staining of peripheral laser scars OU, mild late leakage from nerve OS      A/P    1-PDR OS>OD:   - S/p PRP OD (06/16/15)   - S/p PRP OS (07/07/15)   - Last HbA1c was 6.4 on 4/8/15   - Emphasized the importance of tight glucose control    2-DME OU   - s/p focal OU   -  Increase in cystoid fluid OS  S/p resumed avastin OD x 4, OS x 11  S/p Ozurdex OD x 1 3/29/19  3/18 - increased DME OD, stable OS  6/18 - q 3 month Avastin working OU      Only mild IOP elevation OD after ozurdex, monitor  Watch OS as well   Consider Ozurdex OU if recurs - can add Cosopt as well if IOP elevated      3-PCIOL OU    4-Ectropion OU:  Saw Mary for repair  Incomplete closer with some moderate inf PEEs         2 months OCT

## 2019-08-07 DIAGNOSIS — M05.79 RHEUMATOID ARTHRITIS INVOLVING MULTIPLE SITES WITH POSITIVE RHEUMATOID FACTOR: ICD-10-CM

## 2019-08-07 DIAGNOSIS — M05.9 SEROPOSITIVE RHEUMATOID ARTHRITIS: ICD-10-CM

## 2019-08-07 RX ORDER — POTASSIUM CHLORIDE 750 MG/1
TABLET, EXTENDED RELEASE ORAL
Qty: 30 TABLET | Refills: 0 | Status: SHIPPED | OUTPATIENT
Start: 2019-08-07 | End: 2019-09-04 | Stop reason: SDUPTHER

## 2019-08-07 RX ORDER — METHOTREXATE 2.5 MG/1
TABLET ORAL
Qty: 15 TABLET | Refills: 0 | Status: SHIPPED | OUTPATIENT
Start: 2019-08-07 | End: 2020-02-27 | Stop reason: SDUPTHER

## 2019-08-07 RX ORDER — FOLIC ACID 1 MG/1
TABLET ORAL
Qty: 90 TABLET | Refills: 0 | Status: SHIPPED | OUTPATIENT
Start: 2019-08-07 | End: 2019-11-04 | Stop reason: SDUPTHER

## 2019-08-15 RX ORDER — METOPROLOL SUCCINATE 50 MG/1
TABLET, EXTENDED RELEASE ORAL
Qty: 30 TABLET | Refills: 0 | Status: SHIPPED | OUTPATIENT
Start: 2019-08-15 | End: 2019-09-15 | Stop reason: SDUPTHER

## 2019-08-26 ENCOUNTER — TELEPHONE (OUTPATIENT)
Dept: ENDOCRINOLOGY | Facility: CLINIC | Age: 79
End: 2019-08-26

## 2019-08-26 ENCOUNTER — INFUSION (OUTPATIENT)
Dept: INFECTIOUS DISEASES | Facility: HOSPITAL | Age: 79
End: 2019-08-26
Attending: INTERNAL MEDICINE
Payer: MEDICARE

## 2019-08-26 VITALS — BODY MASS INDEX: 27.24 KG/M2 | WEIGHT: 135.13 LBS | HEIGHT: 59 IN

## 2019-08-26 DIAGNOSIS — M81.0 SENILE OSTEOPOROSIS: Primary | ICD-10-CM

## 2019-08-26 PROCEDURE — 63600175 PHARM REV CODE 636 W HCPCS: Mod: JG | Performed by: INTERNAL MEDICINE

## 2019-08-26 PROCEDURE — 96372 THER/PROPH/DIAG INJ SC/IM: CPT

## 2019-08-26 RX ADMIN — DENOSUMAB 60 MG: 60 INJECTION SUBCUTANEOUS at 10:08

## 2019-08-26 NOTE — TELEPHONE ENCOUNTER
----- Message from Alessandra Garner LPN sent at 8/26/2019  1:39 PM CDT -----  Mrs Tolbert is an old patient of Dr Mosher and will need to see a new  Prior to her next injection of Prolia on 2/27/20.  No recall in place. Please send her an appt .  Thanks

## 2019-09-04 RX ORDER — POTASSIUM CHLORIDE 750 MG/1
TABLET, EXTENDED RELEASE ORAL
Qty: 30 TABLET | Refills: 0 | Status: SHIPPED | OUTPATIENT
Start: 2019-09-04 | End: 2019-10-03 | Stop reason: SDUPTHER

## 2019-09-15 RX ORDER — METOPROLOL SUCCINATE 50 MG/1
TABLET, EXTENDED RELEASE ORAL
Qty: 30 TABLET | Refills: 0 | Status: SHIPPED | OUTPATIENT
Start: 2019-09-15 | End: 2019-10-13 | Stop reason: SDUPTHER

## 2019-09-23 ENCOUNTER — OFFICE VISIT (OUTPATIENT)
Dept: NEUROLOGY | Facility: CLINIC | Age: 79
End: 2019-09-23
Payer: MEDICARE

## 2019-09-23 VITALS
HEIGHT: 59 IN | HEART RATE: 81 BPM | BODY MASS INDEX: 27.69 KG/M2 | SYSTOLIC BLOOD PRESSURE: 154 MMHG | WEIGHT: 137.38 LBS | DIASTOLIC BLOOD PRESSURE: 75 MMHG

## 2019-09-23 DIAGNOSIS — G70.00 MYASTHENIA GRAVIS STATUS POST THYMECTOMY: Primary | ICD-10-CM

## 2019-09-23 DIAGNOSIS — Z90.89 MYASTHENIA GRAVIS STATUS POST THYMECTOMY: Primary | ICD-10-CM

## 2019-09-23 DIAGNOSIS — Z79.52 CURRENT CHRONIC USE OF SYSTEMIC STEROIDS: Chronic | ICD-10-CM

## 2019-09-23 DIAGNOSIS — G62.9 PERIPHERAL POLYNEUROPATHY: ICD-10-CM

## 2019-09-23 DIAGNOSIS — I10 ESSENTIAL HYPERTENSION: ICD-10-CM

## 2019-09-23 DIAGNOSIS — E11.3513 CONTROLLED TYPE 2 DIABETES MELLITUS WITH BOTH EYES AFFECTED BY PROLIFERATIVE RETINOPATHY AND MACULAR EDEMA, WITHOUT LONG-TERM CURRENT USE OF INSULIN: ICD-10-CM

## 2019-09-23 PROCEDURE — 99999 PR PBB SHADOW E&M-EST. PATIENT-LVL III: ICD-10-PCS | Mod: PBBFAC,,, | Performed by: PSYCHIATRY & NEUROLOGY

## 2019-09-23 PROCEDURE — 99213 OFFICE O/P EST LOW 20 MIN: CPT | Mod: PBBFAC | Performed by: PSYCHIATRY & NEUROLOGY

## 2019-09-23 PROCEDURE — 99214 OFFICE O/P EST MOD 30 MIN: CPT | Mod: S$PBB,,, | Performed by: PSYCHIATRY & NEUROLOGY

## 2019-09-23 PROCEDURE — 99214 PR OFFICE/OUTPT VISIT, EST, LEVL IV, 30-39 MIN: ICD-10-PCS | Mod: S$PBB,,, | Performed by: PSYCHIATRY & NEUROLOGY

## 2019-09-23 PROCEDURE — 99999 PR PBB SHADOW E&M-EST. PATIENT-LVL III: CPT | Mod: PBBFAC,,, | Performed by: PSYCHIATRY & NEUROLOGY

## 2019-09-23 RX ORDER — PREDNISONE 5 MG/1
TABLET ORAL
Qty: 90 TABLET | Refills: 5 | Status: SHIPPED | OUTPATIENT
Start: 2019-09-23 | End: 2020-11-28

## 2019-09-23 NOTE — LETTER
September 30, 2019      Trinity Araujo MD  [address]  [facility] [address]           [facility]  Neurology  [address]  [facility] [address]  Phone: [phone]  Fax: [phone]          Patient: Teresita Tolbert   MR Number: [id number]   YOB: 1940   Date of Visit: 9/23/2019       Dear Dr. Trinity Araujo:    Thank you for referring Teresita Tolbert to me for evaluation. Attached you will find relevant portions of my assessment and plan of care.    If you have questions, please do not hesitate to call me. I look forward to following Teresita Tolbert along with you.    Sincerely,    Daniele Lyles MD    Enclosure  CC:  No Recipients    If you would like to receive this communication electronically, please contact [email or web address] or [phone] to request more information on [email or web address] Link access.    For providers and/or their staff who would like to refer a patient to [facility], please contact us through our one-stop-shop provider referral line, [facility], at [phone].    If you feel you have received this communication in error or would no longer like to receive these types of communications, please e-mail [email or web address]

## 2019-09-30 PROBLEM — Z79.52 CURRENT CHRONIC USE OF SYSTEMIC STEROIDS: Chronic | Status: ACTIVE | Noted: 2019-09-30

## 2019-10-01 ENCOUNTER — LAB VISIT (OUTPATIENT)
Dept: LAB | Facility: HOSPITAL | Age: 79
End: 2019-10-01
Attending: INTERNAL MEDICINE
Payer: MEDICARE

## 2019-10-01 DIAGNOSIS — Z79.899 HIGH RISK MEDICATION USE: Chronic | ICD-10-CM

## 2019-10-01 DIAGNOSIS — M05.79 RHEUMATOID ARTHRITIS INVOLVING MULTIPLE SITES WITH POSITIVE RHEUMATOID FACTOR: ICD-10-CM

## 2019-10-01 LAB
ALBUMIN SERPL BCP-MCNC: 3.8 G/DL (ref 3.5–5.2)
ALP SERPL-CCNC: 46 U/L (ref 55–135)
ALT SERPL W/O P-5'-P-CCNC: 23 U/L (ref 10–44)
ANION GAP SERPL CALC-SCNC: 11 MMOL/L (ref 8–16)
AST SERPL-CCNC: 17 U/L (ref 10–40)
BASOPHILS # BLD AUTO: 0.02 K/UL (ref 0–0.2)
BASOPHILS NFR BLD: 0.2 % (ref 0–1.9)
BILIRUB SERPL-MCNC: 0.6 MG/DL (ref 0.1–1)
BUN SERPL-MCNC: 11 MG/DL (ref 8–23)
CALCIUM SERPL-MCNC: 9.2 MG/DL (ref 8.7–10.5)
CHLORIDE SERPL-SCNC: 106 MMOL/L (ref 95–110)
CO2 SERPL-SCNC: 27 MMOL/L (ref 23–29)
CREAT SERPL-MCNC: 0.8 MG/DL (ref 0.5–1.4)
CRP SERPL-MCNC: 3.6 MG/L (ref 0–8.2)
DIFFERENTIAL METHOD: ABNORMAL
EOSINOPHIL # BLD AUTO: 0.3 K/UL (ref 0–0.5)
EOSINOPHIL NFR BLD: 2.9 % (ref 0–8)
ERYTHROCYTE [DISTWIDTH] IN BLOOD BY AUTOMATED COUNT: 14.1 % (ref 11.5–14.5)
ERYTHROCYTE [SEDIMENTATION RATE] IN BLOOD BY WESTERGREN METHOD: 42 MM/HR (ref 0–36)
EST. GFR  (AFRICAN AMERICAN): >60 ML/MIN/1.73 M^2
EST. GFR  (NON AFRICAN AMERICAN): >60 ML/MIN/1.73 M^2
GLUCOSE SERPL-MCNC: 126 MG/DL (ref 70–110)
HCT VFR BLD AUTO: 40 % (ref 37–48.5)
HGB BLD-MCNC: 12.4 G/DL (ref 12–16)
LYMPHOCYTES # BLD AUTO: 1.9 K/UL (ref 1–4.8)
LYMPHOCYTES NFR BLD: 19.7 % (ref 18–48)
MCH RBC QN AUTO: 28 PG (ref 27–31)
MCHC RBC AUTO-ENTMCNC: 31 G/DL (ref 32–36)
MCV RBC AUTO: 90 FL (ref 82–98)
MONOCYTES # BLD AUTO: 0.5 K/UL (ref 0.3–1)
MONOCYTES NFR BLD: 5.6 % (ref 4–15)
NEUTROPHILS # BLD AUTO: 6.7 K/UL (ref 1.8–7.7)
NEUTROPHILS NFR BLD: 71.6 % (ref 38–73)
PLATELET # BLD AUTO: 268 K/UL (ref 150–350)
PMV BLD AUTO: 10.5 FL (ref 9.2–12.9)
POTASSIUM SERPL-SCNC: 3.5 MMOL/L (ref 3.5–5.1)
PROT SERPL-MCNC: 7.3 G/DL (ref 6–8.4)
RBC # BLD AUTO: 4.43 M/UL (ref 4–5.4)
SODIUM SERPL-SCNC: 144 MMOL/L (ref 136–145)
WBC # BLD AUTO: 9.43 K/UL (ref 3.9–12.7)

## 2019-10-01 PROCEDURE — 80053 COMPREHEN METABOLIC PANEL: CPT

## 2019-10-01 PROCEDURE — 36415 COLL VENOUS BLD VENIPUNCTURE: CPT

## 2019-10-01 PROCEDURE — 86140 C-REACTIVE PROTEIN: CPT

## 2019-10-01 PROCEDURE — 85652 RBC SED RATE AUTOMATED: CPT

## 2019-10-01 PROCEDURE — 85025 COMPLETE CBC W/AUTO DIFF WBC: CPT

## 2019-10-01 NOTE — ASSESSMENT & PLAN NOTE
On Prednisone 5 mg daily and MTX 7.5 mg weekly per Dr. Squires for RA, but this may be contributing to MG care. Patient declined addition of Mestinon for symptomatic MG control for fear of worsening her occasional diarrhea.   - Continue Prednisone 5 mg daily.

## 2019-10-01 NOTE — PROGRESS NOTES
"Patient Name: Teresita Tolbert  MRN: [id number]    CC: Myasthenia gravis    Interval History 9/23/2019 - I have reviewed all relevant medical records in [facility].At baseline since last visit. Here with her son, who is preoccupied with fantasy football on his phone during the visit. Patient does not want to add any Mestinon or other medications to regimen. Fears worsening of diarrhea with Mestinon. No changes in bulbar muscle strength since last visit. Still with poor speech due to protruding tongue.    She does have some low back pain and muscle spasms that started a few months ago but which have improved some with rest and time. No recent falls.     HPI: Teresita Tolbert is a 78 y.o. female with Myasthenia gravis, previously followed by Dr. Duran, Dr. Georges and Dr. Morse.    Diagnosed: 1989, bulbar predominate  Readmission: multiple, unknown dates  Antibody status: MUSK+  Thymoma: s/p thymectomy  Medications: Prednisone 5mg daily, MTX 7.5 mg/wk (previously imuran 100mg per day)  Acute treatments: ?PLEX  Intubated: never    Today she is accompanied by her son, and they both report that she is stable. She says she has to be "careful" every day with swallowing, occasionally chokes/coughs with liquids or solids but not often. She always has droopy eyelids bilaterally (worse with fatigue), always feels short of breath with exertion, feels weak all over. She denies double vision. Since diagnosis she has had these symptoms, and she's never returned to baseline despite all treatments. She has tried mestinon in the past, but is unsure if it was helpful.    She is prescribed MTX by Dr. Squires for RA, which he decreased from 10mg/week to 7.5mg/week at the last visit.      ROS:   Review of Systems   Constitutional: +malaise/fatigue. Negative for weight loss.   HENT: Negative for hearing loss.   Eyes: Negative for blurred vision and double vision.   Respiratory: +shortness of breath.   Cardiovascular: Negative for chest pain and " palpitations.   Gastrointestinal: Negative for nausea, vomiting and constipation. +diarrhea  Genitourinary: Negative for frequency. Negative for urgency.   Musculoskeletal: Negative for joint pain. Negative for myalgias and falls.   Skin: Negative for rash.   Neurological: Negative for dizziness and tremors. Negative for focal weakness and seizures.   Endo/Heme/Allergies: Does not bruise/bleed easily.   Psychiatric/Behavioral: Negative for memory loss. Negative for depression or hallucinations. The patient is not nervous/anxious.      Past Medical History  Past Medical History:   Diagnosis Date    Anemia     Arthritis of hand 2/1/2013    Cataract     Colon polyps     Diabetes mellitus     Diabetic retinopathy     Encounter for blood transfusion     GERD (gastroesophageal reflux disease)     HTN (hypertension) 8/21/2013    Hypertension     Myasthenia gravis     Osteoporosis     Type II or unspecified type diabetes mellitus without mention of complication, uncontrolled 8/21/2013       Medications    Current Outpatient Medications:     amLODIPine (NORVASC) 5 MG tablet, TAKE 1 TABLET(5 MG) BY MOUTH EVERY DAY, Disp: 30 tablet, Rfl: 3    aspirin (ECOTRIN) 81 MG EC tablet, Take 81 mg by mouth once daily.  , Disp: , Rfl:     bismuth subsalicylate (PEPTO BISMOL) 262 mg/15 mL suspension, Take 15 mLs by mouth every 6 (six) hours as needed., Disp: , Rfl:     blood glucose strip-disp meter Kit, Use to test blood glucose 3 times a day  Insurance preferred, Disp: 1 kit, Rfl: 0    blood sugar diagnostic (ONETOUCH ULTRA TEST) Strp, Inject 1 strip into the skin 2 times daily 2 hours after meal. * * * As directed.  test sugars 2 times daily, Disp: 300 strip, Rfl: 12    blood sugar diagnostic Strp, Use to test blood glucose 3 times a day  Insurance preferred, Disp: 300 each, Rfl: 4    cholecalciferol, vitamin D3, (VITAMIN D3) 1,000 unit capsule, Take 2 capsules (2,000 Units total) by mouth once daily., Disp: 100  capsule, Rfl: 12    ferrous sulfate 325 mg (65 mg iron) Tab tablet, TAKE 1 TABLET(325 MG) BY MOUTH DAILY WITH BREAKFAST, Disp: 30 tablet, Rfl: 0    folic acid (FOLVITE) 1 MG tablet, TAKE 1 TABLET BY MOUTH EVERY DAY, Disp: 90 tablet, Rfl: 0    JANUMET  mg per tablet, TAKE 1 TABLET BY MOUTH TWICE DAILY, Disp: 60 tablet, Rfl: 5    lancets (BD ULTRA FINE LANCETS) 33 gauge Misc, Use to test blood glucose 3 times a day  Insurance preferred, Disp: 100 each, Rfl: 3    methotrexate 2.5 MG Tab, TAKE 3 TABLETS(7.5 MG) BY MOUTH EVERY 7 DAYS, Disp: 15 tablet, Rfl: 0    metoprolol succinate (TOPROL-XL) 50 MG 24 hr tablet, TAKE 1 TABLET(50 MG) BY MOUTH EVERY DAY, Disp: 30 tablet, Rfl: 0    MINERAL OIL/PETROLATUM,WHITE (SOOTHE NIGHT TIME LUBRICANT OPHT), Apply to eye., Disp: , Rfl:     multivitamin capsule, Take 1 capsule by mouth once daily.  , Disp: , Rfl:     omeprazole (PRILOSEC) 20 MG capsule, Take 20 mg by mouth once daily., Disp: , Rfl:     potassium chloride (KLOR-CON) 10 MEQ TbSR, TAKE 1 TABLET(10 MEQ) BY MOUTH EVERY DAY, Disp: 30 tablet, Rfl: 0    predniSONE (DELTASONE) 5 MG tablet, TAKE 2 TABLETS BY MOUTH EVERY OTHER DAY, Disp: 90 tablet, Rfl: 5    Current Facility-Administered Medications:     denosumab (PROLIA) injection 60 mg, 60 mg, Subcutaneous, Q6 Months, Gene Mosher II, MD, 60 mg at 01/16/17 1400    Allergies  Review of patient's allergies indicates:   Allergen Reactions    Erythromycin Shortness Of Breath     All mycins per patient  -affects myasthenia gravis    Sulfa (sulfonamide antibiotics)        Social History  Social History     Socioeconomic History    Marital status:      Spouse name: Not on file    Number of children: Not on file    Years of education: Not on file    Highest education level: Not on file   Occupational History    Not on file   Social Needs    Financial resource strain: Not on file    Food insecurity:     Worry: Not on file     Inability: Not on file  "   Transportation needs:     Medical: Not on file     Non-medical: Not on file   Tobacco Use    Smoking status: Former Smoker     Types: Cigarettes     Last attempt to quit:      Years since quittin.7    Smokeless tobacco: Never Used   Substance and Sexual Activity    Alcohol use: No    Drug use: No    Sexual activity: Not on file   Lifestyle    Physical activity:     Days per week: Not on file     Minutes per session: Not on file    Stress: Not on file   Relationships    Social connections:     Talks on phone: Not on file     Gets together: Not on file     Attends Shinto service: Not on file     Active member of club or organization: Not on file     Attends meetings of clubs or organizations: Not on file     Relationship status: Not on file   Other Topics Concern    Not on file   Social History Narrative    Not on file       Family History  Family History   Problem Relation Age of Onset    Diabetes Mother     Celiac disease Neg Hx     Colon polyps Neg Hx     Colon cancer Neg Hx     Esophageal cancer Neg Hx     Inflammatory bowel disease Neg Hx     Irritable bowel syndrome Neg Hx     Stomach cancer Neg Hx        Physical Exam  BP (!) 154/75   Pulse 81   Ht 4' 11" (1.499 m)   Wt 62.3 kg (137 lb 5.6 oz)   BMI 27.74 kg/m²       Constitutional  Well-developed, well-nourished, appears stated age   Neurological    * Mental status      - Orientation  Oriented to person, place, time, and situation     - Attention/concentration  Attentive, vigilant during exam     - Executive  Well-organized thoughts     - Other     * Cranial nerves       - CN II  PERRL     - CN III, IV, VI  Mildly limited leftward gaze bilaterally, normal pursuits and saccades     - CN V  Sensation V1 - V3 intact     - CN VII  Bilateral orbicularis oculi weakness, bilateral ptosis, unable to raise eyebrows bilaterally. Bilateral buccal weakness, weak tongue movements. Speaks with tongue protruding     - CN VIII  Hearing " intact bilaterally     - CN IX, X  Palate raises midline and symmetric     - CN XI  SCM and trapezius 5/5 bilaterally     - CN XII  Tongue midline   * Motor  Muscle bulk normal, strength 5/5 throughout   * Sensory   Intact to touch throughout   * Coordination  No dysmetria with finger-to-nose    * Gait  Walks with cane, stooped   * Deep tendon reflexes  2+ and symmetric throughout   Babinski mute bilaterally       Lab and Test Results  Lab Results   Component Value Date    HGBA1C 7.9 (H) 01/23/2019     Lab Results   Component Value Date    TSH 1.717 03/24/2017    FREET4 1.09 04/01/2011         Images:   DEXA 10/12/18  Osteoporosis      Assessment and Plan    Teresita Tolbert is a 78 y.o. female with Myasthenia gravis. We discussed adding mestinon for better symptom control, but she would not like to risk the side effects (diarrhea).     Problem List Items Addressed This Visit     Current chronic use of systemic steroids (Chronic)    Current Assessment & Plan     On Prednisone 5 mg daily and likely contributing challenges of controlling DM2.         Myasthenia gravis status post thymectomy - Primary    Overview     Dx 1989 s/p thymectomy. MUSK+           Current Assessment & Plan     On Prednisone 5 mg daily and MTX 7.5 mg weekly per Dr. Squires for RA, but this may be contributing to MG care. Patient declined addition of Mestinon for symptomatic MG control for fear of worsening her occasional diarrhea.   - Continue Prednisone 5 mg daily.         Relevant Medications    predniSONE (DELTASONE) 5 MG tablet    HTN (hypertension)    Current Assessment & Plan     On appropriate medications and managed by PCP. We discussed the importance of managing all secondary stroke risk factors, including hypertension.           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 We discussed the importance of managing all secondary stroke risk factors, including DM2.           PN (peripheral neuropathy)        Tommy Lyles MD  [facility]

## 2019-10-04 RX ORDER — POTASSIUM CHLORIDE 750 MG/1
TABLET, EXTENDED RELEASE ORAL
Qty: 30 TABLET | Refills: 0 | Status: SHIPPED | OUTPATIENT
Start: 2019-10-04 | End: 2019-11-04 | Stop reason: SDUPTHER

## 2019-10-14 RX ORDER — METOPROLOL SUCCINATE 50 MG/1
TABLET, EXTENDED RELEASE ORAL
Qty: 30 TABLET | Refills: 0 | Status: SHIPPED | OUTPATIENT
Start: 2019-10-14 | End: 2019-11-13 | Stop reason: SDUPTHER

## 2019-10-15 ENCOUNTER — OFFICE VISIT (OUTPATIENT)
Dept: OPHTHALMOLOGY | Facility: CLINIC | Age: 79
End: 2019-10-15
Attending: OPHTHALMOLOGY
Payer: MEDICARE

## 2019-10-15 DIAGNOSIS — H43.813 POSTERIOR VITREOUS DETACHMENT OF BOTH EYES: ICD-10-CM

## 2019-10-15 DIAGNOSIS — H35.373 EPIRETINAL MEMBRANE (ERM) OF BOTH EYES: ICD-10-CM

## 2019-10-15 DIAGNOSIS — E11.3513 CONTROLLED TYPE 2 DIABETES MELLITUS WITH BOTH EYES AFFECTED BY PROLIFERATIVE RETINOPATHY AND MACULAR EDEMA, WITHOUT LONG-TERM CURRENT USE OF INSULIN: Primary | ICD-10-CM

## 2019-10-15 PROCEDURE — 99213 OFFICE O/P EST LOW 20 MIN: CPT | Mod: PBBFAC,25 | Performed by: OPHTHALMOLOGY

## 2019-10-15 PROCEDURE — 99999 PR PBB SHADOW E&M-EST. PATIENT-LVL III: ICD-10-PCS | Mod: PBBFAC,,, | Performed by: OPHTHALMOLOGY

## 2019-10-15 PROCEDURE — 92226 PR SPECIAL EYE EXAM, SUBSEQUENT: ICD-10-PCS | Mod: 50,S$PBB,, | Performed by: OPHTHALMOLOGY

## 2019-10-15 PROCEDURE — 92014 COMPRE OPH EXAM EST PT 1/>: CPT | Mod: S$PBB,,, | Performed by: OPHTHALMOLOGY

## 2019-10-15 PROCEDURE — 92226 PR SPECIAL EYE EXAM, SUBSEQUENT: CPT | Mod: 50,PBBFAC | Performed by: OPHTHALMOLOGY

## 2019-10-15 PROCEDURE — 92134 POSTERIOR SEGMENT OCT RETINA (OCULAR COHERENCE TOMOGRAPHY)-BOTH EYES: ICD-10-PCS | Mod: 26,S$PBB,, | Performed by: OPHTHALMOLOGY

## 2019-10-15 PROCEDURE — 99999 PR PBB SHADOW E&M-EST. PATIENT-LVL III: CPT | Mod: PBBFAC,,, | Performed by: OPHTHALMOLOGY

## 2019-10-15 PROCEDURE — 92134 CPTRZ OPH DX IMG PST SGM RTA: CPT | Mod: PBBFAC | Performed by: OPHTHALMOLOGY

## 2019-10-15 PROCEDURE — 92014 PR EYE EXAM, EST PATIENT,COMPREHESV: ICD-10-PCS | Mod: S$PBB,,, | Performed by: OPHTHALMOLOGY

## 2019-10-15 PROCEDURE — 92226 PR SPECIAL EYE EXAM, SUBSEQUENT: CPT | Mod: 50,S$PBB,, | Performed by: OPHTHALMOLOGY

## 2019-10-15 NOTE — PROGRESS NOTES
HPI     2 mo oct  DLS-05/31/2019 Dr. Olsen     Pt sts no changes still having same blurred va   (-)Flashes (+)Floaters occasionally   (-)Photophobia  (-)Glare    Systane ou qid      OCT - DME OU - good focal OU  Stable from prior    Prior FA shows good perfusion time OU, multiple late leakage points in macula OU, staining of peripheral laser scars OU, mild late leakage from nerve OS      A/P    1-PDR OS>OD:   - S/p PRP OD (06/16/15)   - S/p PRP OS (07/07/15)   - Last HbA1c was 6.4 on 4/8/15   - Emphasized the importance of tight glucose control    2-DME OU   - s/p focal OU   -  Increase in cystoid fluid OS  S/p resumed avastin OD x 4, OS x 11  S/p Ozurdex OD x 1 3/29/19  3/18 - increased DME OD, stable OS  6/18 - q 3 month Avastin working OU      Only mild IOP elevation OD after ozurdex, monitor  Watch OS as well   Consider Ozurdex OU if recurs - can add Cosopt as well if IOP elevated    Stable today      3-PCIOL OU    4-Ectropion OU:  Saw Mary for repair  Incomplete closer with some moderate inf PEEs         6 months OCT

## 2019-11-04 DIAGNOSIS — M05.9 SEROPOSITIVE RHEUMATOID ARTHRITIS: ICD-10-CM

## 2019-11-04 RX ORDER — POTASSIUM CHLORIDE 750 MG/1
TABLET, EXTENDED RELEASE ORAL
Qty: 30 TABLET | Refills: 0 | Status: SHIPPED | OUTPATIENT
Start: 2019-11-04 | End: 2019-11-14 | Stop reason: SDUPTHER

## 2019-11-04 RX ORDER — FOLIC ACID 1 MG/1
TABLET ORAL
Qty: 90 TABLET | Refills: 0 | Status: SHIPPED | OUTPATIENT
Start: 2019-11-04 | End: 2020-02-05 | Stop reason: SDUPTHER

## 2019-11-13 RX ORDER — AMLODIPINE BESYLATE 5 MG/1
TABLET ORAL
Qty: 30 TABLET | Refills: 0 | Status: SHIPPED | OUTPATIENT
Start: 2019-11-13 | End: 2019-12-11 | Stop reason: SDUPTHER

## 2019-11-13 RX ORDER — METOPROLOL SUCCINATE 50 MG/1
TABLET, EXTENDED RELEASE ORAL
Qty: 30 TABLET | Refills: 0 | Status: SHIPPED | OUTPATIENT
Start: 2019-11-13 | End: 2019-12-11 | Stop reason: SDUPTHER

## 2019-11-15 RX ORDER — POTASSIUM CHLORIDE 750 MG/1
TABLET, EXTENDED RELEASE ORAL
Qty: 30 TABLET | Refills: 0 | OUTPATIENT
Start: 2019-11-15

## 2019-11-15 RX ORDER — POTASSIUM CHLORIDE 750 MG/1
TABLET, EXTENDED RELEASE ORAL
Qty: 30 TABLET | Refills: 0 | Status: SHIPPED | OUTPATIENT
Start: 2019-11-15 | End: 2020-02-10

## 2019-11-29 DIAGNOSIS — E11.65 UNCONTROLLED TYPE 2 DIABETES MELLITUS WITH HYPERGLYCEMIA: ICD-10-CM

## 2019-11-29 RX ORDER — SITAGLIPTIN AND METFORMIN HYDROCHLORIDE 500; 50 MG/1; MG/1
TABLET, FILM COATED ORAL
Qty: 60 TABLET | Refills: 0 | Status: SHIPPED | OUTPATIENT
Start: 2019-11-29 | End: 2020-07-23

## 2019-12-02 RX ORDER — SITAGLIPTIN AND METFORMIN HYDROCHLORIDE 500; 50 MG/1; MG/1
TABLET, FILM COATED ORAL
Qty: 60 TABLET | Refills: 0 | Status: SHIPPED | OUTPATIENT
Start: 2019-12-02 | End: 2020-01-29

## 2019-12-11 DIAGNOSIS — M05.79 RHEUMATOID ARTHRITIS INVOLVING MULTIPLE SITES WITH POSITIVE RHEUMATOID FACTOR: ICD-10-CM

## 2019-12-11 RX ORDER — METHOTREXATE 2.5 MG/1
TABLET ORAL
Qty: 20 TABLET | Refills: 2 | Status: SHIPPED | OUTPATIENT
Start: 2019-12-11 | End: 2020-02-27 | Stop reason: DRUGHIGH

## 2019-12-11 RX ORDER — AMLODIPINE BESYLATE 5 MG/1
TABLET ORAL
Qty: 30 TABLET | Refills: 0 | Status: SHIPPED | OUTPATIENT
Start: 2019-12-11 | End: 2020-01-13

## 2019-12-11 RX ORDER — METOPROLOL SUCCINATE 50 MG/1
TABLET, EXTENDED RELEASE ORAL
Qty: 30 TABLET | Refills: 0 | Status: SHIPPED | OUTPATIENT
Start: 2019-12-11 | End: 2020-01-12

## 2020-01-12 RX ORDER — METOPROLOL SUCCINATE 50 MG/1
TABLET, EXTENDED RELEASE ORAL
Qty: 30 TABLET | Refills: 0 | Status: SHIPPED | OUTPATIENT
Start: 2020-01-12 | End: 2020-02-10

## 2020-01-13 RX ORDER — AMLODIPINE BESYLATE 5 MG/1
TABLET ORAL
Qty: 30 TABLET | Refills: 0 | Status: SHIPPED | OUTPATIENT
Start: 2020-01-13 | End: 2020-02-10

## 2020-01-29 DIAGNOSIS — E11.65 UNCONTROLLED TYPE 2 DIABETES MELLITUS WITH HYPERGLYCEMIA: ICD-10-CM

## 2020-01-29 RX ORDER — SITAGLIPTIN AND METFORMIN HYDROCHLORIDE 500; 50 MG/1; MG/1
TABLET, FILM COATED ORAL
Qty: 60 TABLET | Refills: 0 | Status: SHIPPED | OUTPATIENT
Start: 2020-01-29 | End: 2020-02-28

## 2020-02-05 DIAGNOSIS — M05.9 SEROPOSITIVE RHEUMATOID ARTHRITIS: ICD-10-CM

## 2020-02-05 DIAGNOSIS — Z79.899 HIGH RISK MEDICATION USE: Primary | ICD-10-CM

## 2020-02-05 RX ORDER — FOLIC ACID 1 MG/1
1000 TABLET ORAL DAILY
Qty: 90 TABLET | Refills: 1 | Status: SHIPPED | OUTPATIENT
Start: 2020-02-05 | End: 2020-08-06

## 2020-02-10 RX ORDER — METOPROLOL SUCCINATE 50 MG/1
TABLET, EXTENDED RELEASE ORAL
Qty: 30 TABLET | Refills: 0 | Status: SHIPPED | OUTPATIENT
Start: 2020-02-10 | End: 2020-03-09

## 2020-02-10 RX ORDER — POTASSIUM CHLORIDE 750 MG/1
TABLET, EXTENDED RELEASE ORAL
Qty: 30 TABLET | Refills: 0 | Status: SHIPPED | OUTPATIENT
Start: 2020-02-10 | End: 2020-02-14

## 2020-02-10 RX ORDER — AMLODIPINE BESYLATE 5 MG/1
TABLET ORAL
Qty: 30 TABLET | Refills: 0 | Status: SHIPPED | OUTPATIENT
Start: 2020-02-10 | End: 2020-03-09

## 2020-02-14 ENCOUNTER — PATIENT MESSAGE (OUTPATIENT)
Dept: INTERNAL MEDICINE | Facility: CLINIC | Age: 80
End: 2020-02-14

## 2020-02-14 RX ORDER — POTASSIUM CHLORIDE 1.5 G/1.58G
10 POWDER, FOR SOLUTION ORAL DAILY
Qty: 1 PACKET | Refills: 0
Start: 2020-02-14 | End: 2021-01-14 | Stop reason: SDUPTHER

## 2020-02-15 ENCOUNTER — PATIENT MESSAGE (OUTPATIENT)
Dept: INTERNAL MEDICINE | Facility: CLINIC | Age: 80
End: 2020-02-15

## 2020-02-27 ENCOUNTER — TELEPHONE (OUTPATIENT)
Dept: RHEUMATOLOGY | Facility: CLINIC | Age: 80
End: 2020-02-27

## 2020-02-27 ENCOUNTER — OFFICE VISIT (OUTPATIENT)
Dept: RHEUMATOLOGY | Facility: CLINIC | Age: 80
End: 2020-02-27
Payer: MEDICARE

## 2020-02-27 ENCOUNTER — INFUSION (OUTPATIENT)
Dept: INFECTIOUS DISEASES | Facility: HOSPITAL | Age: 80
End: 2020-02-27
Attending: INTERNAL MEDICINE
Payer: MEDICARE

## 2020-02-27 VITALS
SYSTOLIC BLOOD PRESSURE: 190 MMHG | DIASTOLIC BLOOD PRESSURE: 80 MMHG | HEIGHT: 62 IN | HEART RATE: 73 BPM | WEIGHT: 137.38 LBS | BODY MASS INDEX: 25.28 KG/M2

## 2020-02-27 VITALS — BODY MASS INDEX: 27.69 KG/M2 | HEIGHT: 59 IN | WEIGHT: 137.38 LBS

## 2020-02-27 DIAGNOSIS — M05.79 RHEUMATOID ARTHRITIS INVOLVING MULTIPLE SITES WITH POSITIVE RHEUMATOID FACTOR: Primary | ICD-10-CM

## 2020-02-27 DIAGNOSIS — M81.0 SENILE OSTEOPOROSIS: ICD-10-CM

## 2020-02-27 DIAGNOSIS — Z79.899 HIGH RISK MEDICATION USE: Primary | Chronic | ICD-10-CM

## 2020-02-27 DIAGNOSIS — Z79.899 HIGH RISK MEDICATION USE: Chronic | ICD-10-CM

## 2020-02-27 PROCEDURE — 63600175 PHARM REV CODE 636 W HCPCS: Mod: JG | Performed by: INTERNAL MEDICINE

## 2020-02-27 PROCEDURE — 96372 THER/PROPH/DIAG INJ SC/IM: CPT

## 2020-02-27 PROCEDURE — 99214 OFFICE O/P EST MOD 30 MIN: CPT | Mod: S$PBB,,, | Performed by: INTERNAL MEDICINE

## 2020-02-27 PROCEDURE — 99213 OFFICE O/P EST LOW 20 MIN: CPT | Mod: PBBFAC,25 | Performed by: INTERNAL MEDICINE

## 2020-02-27 PROCEDURE — 99214 PR OFFICE/OUTPT VISIT, EST, LEVL IV, 30-39 MIN: ICD-10-PCS | Mod: S$PBB,,, | Performed by: INTERNAL MEDICINE

## 2020-02-27 PROCEDURE — 99999 PR PBB SHADOW E&M-EST. PATIENT-LVL III: ICD-10-PCS | Mod: PBBFAC,,, | Performed by: INTERNAL MEDICINE

## 2020-02-27 PROCEDURE — 99999 PR PBB SHADOW E&M-EST. PATIENT-LVL III: CPT | Mod: PBBFAC,,, | Performed by: INTERNAL MEDICINE

## 2020-02-27 RX ORDER — METHOTREXATE 2.5 MG/1
7.5 TABLET ORAL
Qty: 15 TABLET | Refills: 2 | Status: SHIPPED | OUTPATIENT
Start: 2020-02-27 | End: 2020-06-29

## 2020-02-27 RX ADMIN — DENOSUMAB 60 MG: 60 INJECTION SUBCUTANEOUS at 11:02

## 2020-02-27 ASSESSMENT — ROUTINE ASSESSMENT OF PATIENT INDEX DATA (RAPID3)
PATIENT GLOBAL ASSESSMENT SCORE: 5.5
TOTAL RAPID3 SCORE: 4.28
MDHAQ FUNCTION SCORE: 1
AM STIFFNESS SCORE: 1, YES
WHEN YOU AWAKENED IN THE MORNING OVER THE LAST WEEK, PLEASE INDICATE THE AMOUNT OF TIME IT TAKES UNTIL YOU ARE AS LIMBER AS YOU WILL BE FOR THE DAY: 15 MINUTES
PSYCHOLOGICAL DISTRESS SCORE: 1.1
PAIN SCORE: 4
FATIGUE SCORE: 5

## 2020-02-27 NOTE — ASSESSMENT & PLAN NOTE
Needs lab but last studies were fine on 7.5 mg/week MTX    She does not want pneumonia or shingles vaccines

## 2020-02-27 NOTE — PROGRESS NOTES
"Subjective:       Patient ID: Teresita Tolbert is a 79 y.o. female.    Chief Complaint: Disease Management    HPI   F/u RA, seropos on MTX 10 mg/week since May 2015  F/u osteoporosis with lumbar compressions   Prolia since 2016; last Aug 2019       PMH: osteoporosis; was on alendronate , switched to Prolia q6m  PMH: myasthenia gravis on prednisone and previously azathioprine until started MTX for RA     Currently MTX 7.5 mg/ week  Prednisone 10 mg q2d (for MG)  Vit D3 daily  Folic acid 1 mg daily    Last lab Oct 1, 2019; CBC and liver enzymes normal   No c/o arthritis sx    Review of Systems   Constitutional: Negative for fatigue, fever and unexpected weight change.   HENT: Positive for trouble swallowing. Negative for mouth sores.         Dry mouth   Eyes: Negative for redness.        Dry eyes   Respiratory: Negative for cough and shortness of breath.    Cardiovascular: Negative for chest pain.   Gastrointestinal: Negative for abdominal pain, constipation and diarrhea.   Skin: Negative for rash.   Neurological: Negative for headaches.   Hematological: Negative for adenopathy. Does not bruise/bleed easily.   Psychiatric/Behavioral: Negative for sleep disturbance.         Objective:   BP (!) 190/80   Pulse 73   Ht 5' 2" (1.575 m)   Wt 62.3 kg (137 lb 5.6 oz)   BMI 25.12 kg/m²      Physical Exam   Constitutional: She is well-developed, well-nourished, and in no distress.   HENT:   Mouth/Throat: Oropharynx is clear and moist.   Eyes: Conjunctivae are normal.   Cardiovascular: Normal rate and regular rhythm.    Pulmonary/Chest: She has no wheezes. She has no rales.   kyphosis   Lymphadenopathy:     She has no cervical adenopathy.   Skin: No rash noted.     Musculoskeletal:   Ulnar deviation but no swelling or tenderness           2/27/2020   Tender (ARREDONDO-28) 0 / 28    Swollen (ARREDONDO-28) 0 / 28    Provider Global Assessment 20 (mm)   Patient Global Assessment 55 (mm)   ESR 40 (mm/hr)   CRP 1.8 (mg/L)   ARREDONDO-28 (ESR) 3.35 " (Moderate disease activity)   ARREDONDO-28 (CRP) 2.1 (Remission)     Lab Results   Component Value Date    SEDRATE 40 (H) 02/27/2020     Lab Results   Component Value Date    CRP 1.8 02/27/2020       Assessment:       1. Rheumatoid arthritis involving multiple sites with positive rheumatoid factor    2. Senile osteoporosis    3. High risk medication use            Plan:       Problem List Items Addressed This Visit        Active Problems    High risk medication use (Chronic)     Needs lab but last studies were fine on 7.5 mg/week MTX    She does not want pneumonia or shingles vaccines         Rheumatoid arthritis involving multiple sites with positive rheumatoid factor - Primary     Currently no swollen joints on 7.5 mg/week MTX    Needs lab   Will cont same MTX for now    Stable so RTC me 6 mo         Relevant Medications    methotrexate 2.5 MG Tab    Senile osteoporosis     Is taking Vit D3  Should cont Prolia   Will need f/u DXA Nov 2020         Relevant Orders    Vitamin D

## 2020-02-27 NOTE — ASSESSMENT & PLAN NOTE
Currently no swollen joints on 7.5 mg/week MTX    Needs lab   Will cont same MTX for now    Stable so RTC me 6 mo

## 2020-02-28 DIAGNOSIS — E11.65 UNCONTROLLED TYPE 2 DIABETES MELLITUS WITH HYPERGLYCEMIA: ICD-10-CM

## 2020-02-28 RX ORDER — SITAGLIPTIN AND METFORMIN HYDROCHLORIDE 500; 50 MG/1; MG/1
TABLET, FILM COATED ORAL
Qty: 60 TABLET | Refills: 1 | Status: SHIPPED | OUTPATIENT
Start: 2020-02-28 | End: 2020-04-30

## 2020-02-28 NOTE — TELEPHONE ENCOUNTER
----- Message from Teagan Villagomez RN sent at 2/27/2020 11:42 AM CST -----  Please put lab order in epic

## 2020-03-09 RX ORDER — AMLODIPINE BESYLATE 5 MG/1
TABLET ORAL
Qty: 30 TABLET | Refills: 0 | Status: SHIPPED | OUTPATIENT
Start: 2020-03-09 | End: 2020-04-10

## 2020-03-09 RX ORDER — METOPROLOL SUCCINATE 50 MG/1
TABLET, EXTENDED RELEASE ORAL
Qty: 30 TABLET | Refills: 0 | Status: SHIPPED | OUTPATIENT
Start: 2020-03-09 | End: 2020-04-10 | Stop reason: SDUPTHER

## 2020-04-10 RX ORDER — AMLODIPINE BESYLATE 5 MG/1
TABLET ORAL
Qty: 30 TABLET | Refills: 0 | Status: SHIPPED | OUTPATIENT
Start: 2020-04-10 | End: 2020-05-12

## 2020-04-10 RX ORDER — METOPROLOL SUCCINATE 50 MG/1
TABLET, EXTENDED RELEASE ORAL
Qty: 30 TABLET | Refills: 0 | Status: SHIPPED | OUTPATIENT
Start: 2020-04-10 | End: 2020-05-11

## 2020-04-21 ENCOUNTER — TELEPHONE (OUTPATIENT)
Dept: INTERNAL MEDICINE | Facility: CLINIC | Age: 80
End: 2020-04-21

## 2020-04-30 DIAGNOSIS — E11.65 UNCONTROLLED TYPE 2 DIABETES MELLITUS WITH HYPERGLYCEMIA: ICD-10-CM

## 2020-04-30 RX ORDER — SITAGLIPTIN AND METFORMIN HYDROCHLORIDE 500; 50 MG/1; MG/1
TABLET, FILM COATED ORAL
Qty: 60 TABLET | Refills: 1 | Status: SHIPPED | OUTPATIENT
Start: 2020-04-30 | End: 2020-07-23

## 2020-05-11 RX ORDER — METOPROLOL SUCCINATE 50 MG/1
TABLET, EXTENDED RELEASE ORAL
Qty: 30 TABLET | Refills: 0 | Status: SHIPPED | OUTPATIENT
Start: 2020-05-11 | End: 2020-06-11

## 2020-05-12 RX ORDER — AMLODIPINE BESYLATE 5 MG/1
TABLET ORAL
Qty: 30 TABLET | Refills: 0 | Status: SHIPPED | OUTPATIENT
Start: 2020-05-12 | End: 2020-06-11

## 2020-05-22 ENCOUNTER — PATIENT MESSAGE (OUTPATIENT)
Dept: RHEUMATOLOGY | Facility: CLINIC | Age: 80
End: 2020-05-22

## 2020-05-27 ENCOUNTER — LAB VISIT (OUTPATIENT)
Dept: LAB | Facility: HOSPITAL | Age: 80
End: 2020-05-27
Attending: INTERNAL MEDICINE
Payer: MEDICARE

## 2020-05-27 ENCOUNTER — PATIENT MESSAGE (OUTPATIENT)
Dept: INTERNAL MEDICINE | Facility: CLINIC | Age: 80
End: 2020-05-27

## 2020-05-27 DIAGNOSIS — Z79.899 HIGH RISK MEDICATION USE: Chronic | ICD-10-CM

## 2020-05-27 DIAGNOSIS — M05.79 RHEUMATOID ARTHRITIS INVOLVING MULTIPLE SITES WITH POSITIVE RHEUMATOID FACTOR: ICD-10-CM

## 2020-05-27 LAB
ALBUMIN SERPL BCP-MCNC: 3.9 G/DL (ref 3.5–5.2)
ALP SERPL-CCNC: 44 U/L (ref 55–135)
ALT SERPL W/O P-5'-P-CCNC: 25 U/L (ref 10–44)
ANION GAP SERPL CALC-SCNC: 9 MMOL/L (ref 8–16)
AST SERPL-CCNC: 19 U/L (ref 10–40)
BASOPHILS # BLD AUTO: 0.04 K/UL (ref 0–0.2)
BASOPHILS NFR BLD: 0.5 % (ref 0–1.9)
BILIRUB SERPL-MCNC: 0.7 MG/DL (ref 0.1–1)
BUN SERPL-MCNC: 14 MG/DL (ref 8–23)
CALCIUM SERPL-MCNC: 9.2 MG/DL (ref 8.7–10.5)
CHLORIDE SERPL-SCNC: 107 MMOL/L (ref 95–110)
CO2 SERPL-SCNC: 28 MMOL/L (ref 23–29)
CREAT SERPL-MCNC: 1 MG/DL (ref 0.5–1.4)
CRP SERPL-MCNC: 3.8 MG/L (ref 0–8.2)
DIFFERENTIAL METHOD: ABNORMAL
EOSINOPHIL # BLD AUTO: 0.2 K/UL (ref 0–0.5)
EOSINOPHIL NFR BLD: 2.7 % (ref 0–8)
ERYTHROCYTE [DISTWIDTH] IN BLOOD BY AUTOMATED COUNT: 14.5 % (ref 11.5–14.5)
ERYTHROCYTE [SEDIMENTATION RATE] IN BLOOD BY WESTERGREN METHOD: 41 MM/HR (ref 0–36)
EST. GFR  (AFRICAN AMERICAN): >60 ML/MIN/1.73 M^2
EST. GFR  (NON AFRICAN AMERICAN): 53.3 ML/MIN/1.73 M^2
GLUCOSE SERPL-MCNC: 142 MG/DL (ref 70–110)
HCT VFR BLD AUTO: 43.5 % (ref 37–48.5)
HGB BLD-MCNC: 12.5 G/DL (ref 12–16)
IMM GRANULOCYTES # BLD AUTO: 0.04 K/UL (ref 0–0.04)
IMM GRANULOCYTES NFR BLD AUTO: 0.5 % (ref 0–0.5)
LYMPHOCYTES # BLD AUTO: 1.9 K/UL (ref 1–4.8)
LYMPHOCYTES NFR BLD: 21.8 % (ref 18–48)
MCH RBC QN AUTO: 27.9 PG (ref 27–31)
MCHC RBC AUTO-ENTMCNC: 28.7 G/DL (ref 32–36)
MCV RBC AUTO: 97 FL (ref 82–98)
MONOCYTES # BLD AUTO: 0.6 K/UL (ref 0.3–1)
MONOCYTES NFR BLD: 7 % (ref 4–15)
NEUTROPHILS # BLD AUTO: 6 K/UL (ref 1.8–7.7)
NEUTROPHILS NFR BLD: 67.5 % (ref 38–73)
NRBC BLD-RTO: 0 /100 WBC
PLATELET # BLD AUTO: 229 K/UL (ref 150–350)
PMV BLD AUTO: 11.9 FL (ref 9.2–12.9)
POTASSIUM SERPL-SCNC: 3.6 MMOL/L (ref 3.5–5.1)
PROT SERPL-MCNC: 7.5 G/DL (ref 6–8.4)
RBC # BLD AUTO: 4.48 M/UL (ref 4–5.4)
SODIUM SERPL-SCNC: 144 MMOL/L (ref 136–145)
WBC # BLD AUTO: 8.84 K/UL (ref 3.9–12.7)

## 2020-05-27 PROCEDURE — 36415 COLL VENOUS BLD VENIPUNCTURE: CPT | Mod: PN

## 2020-05-27 PROCEDURE — 85652 RBC SED RATE AUTOMATED: CPT

## 2020-05-27 PROCEDURE — 80053 COMPREHEN METABOLIC PANEL: CPT

## 2020-05-27 PROCEDURE — 85025 COMPLETE CBC W/AUTO DIFF WBC: CPT

## 2020-05-27 PROCEDURE — 86140 C-REACTIVE PROTEIN: CPT

## 2020-05-28 NOTE — TELEPHONE ENCOUNTER
Can we look for the form? I do not seem to be able to find it? Maybe I missed it in my folder?     Any help would be appreciated.

## 2020-07-15 DIAGNOSIS — Z71.89 COMPLEX CARE COORDINATION: ICD-10-CM

## 2020-07-23 ENCOUNTER — OFFICE VISIT (OUTPATIENT)
Dept: INTERNAL MEDICINE | Facility: CLINIC | Age: 80
End: 2020-07-23
Payer: MEDICARE

## 2020-07-23 VITALS
BODY MASS INDEX: 23.39 KG/M2 | DIASTOLIC BLOOD PRESSURE: 74 MMHG | WEIGHT: 127.88 LBS | HEART RATE: 62 BPM | OXYGEN SATURATION: 97 % | SYSTOLIC BLOOD PRESSURE: 136 MMHG

## 2020-07-23 DIAGNOSIS — G62.9 PERIPHERAL POLYNEUROPATHY: ICD-10-CM

## 2020-07-23 DIAGNOSIS — G70.00 MYASTHENIA GRAVIS STATUS POST THYMECTOMY: Primary | ICD-10-CM

## 2020-07-23 DIAGNOSIS — I10 ESSENTIAL HYPERTENSION: ICD-10-CM

## 2020-07-23 DIAGNOSIS — Z90.89 MYASTHENIA GRAVIS STATUS POST THYMECTOMY: Primary | ICD-10-CM

## 2020-07-23 DIAGNOSIS — E11.3513 CONTROLLED TYPE 2 DIABETES MELLITUS WITH BOTH EYES AFFECTED BY PROLIFERATIVE RETINOPATHY AND MACULAR EDEMA, WITHOUT LONG-TERM CURRENT USE OF INSULIN: ICD-10-CM

## 2020-07-23 DIAGNOSIS — E11.65 UNCONTROLLED TYPE 2 DIABETES MELLITUS WITH HYPERGLYCEMIA: ICD-10-CM

## 2020-07-23 PROCEDURE — 99214 PR OFFICE/OUTPT VISIT, EST, LEVL IV, 30-39 MIN: ICD-10-PCS | Mod: S$PBB,,, | Performed by: INTERNAL MEDICINE

## 2020-07-23 PROCEDURE — 99999 PR PBB SHADOW E&M-EST. PATIENT-LVL V: CPT | Mod: PBBFAC,,, | Performed by: INTERNAL MEDICINE

## 2020-07-23 PROCEDURE — 99215 OFFICE O/P EST HI 40 MIN: CPT | Mod: PBBFAC | Performed by: INTERNAL MEDICINE

## 2020-07-23 PROCEDURE — 99214 OFFICE O/P EST MOD 30 MIN: CPT | Mod: S$PBB,,, | Performed by: INTERNAL MEDICINE

## 2020-07-23 PROCEDURE — 99999 PR PBB SHADOW E&M-EST. PATIENT-LVL V: ICD-10-PCS | Mod: PBBFAC,,, | Performed by: INTERNAL MEDICINE

## 2020-07-23 RX ORDER — SITAGLIPTIN AND METFORMIN HYDROCHLORIDE 500; 50 MG/1; MG/1
1 TABLET, FILM COATED ORAL 2 TIMES DAILY
Qty: 60 TABLET | Refills: 12 | Status: SHIPPED | OUTPATIENT
Start: 2020-07-23 | End: 2020-08-14

## 2020-07-23 NOTE — PROGRESS NOTES
Subjective:       Patient ID: Teresita Tolbert is a 79 y.o. female.    Chief Complaint: Annual Exam    Patient attended by her son here for annual follow-up.  Patient has a history of myasthenia gravis, diabetes, anemia, arthritis and neuropathy.  She is also followed for rheumatoid arthritis by rheumatology.  She needs her test strips refilled, and she needs Janumet refilled.  She is due for a few labs to be updated so we will assist with those.  She says she feels well.  She has an upcoming eye appointment.  She needs her endocrinology follow-up since her prior doctor retired.  She denies any other complaints.  She test twice daily and she needs an updated form to get her test strips.    Review of Systems   Constitutional: Negative for appetite change, chills and fever.   HENT: Negative for nosebleeds and sore throat.    Eyes: Positive for visual disturbance.   Respiratory: Negative for cough, shortness of breath and wheezing.    Cardiovascular: Negative for chest pain and leg swelling.   Gastrointestinal: Negative for abdominal pain, constipation and diarrhea.   Genitourinary: Negative for difficulty urinating and hematuria.   Musculoskeletal: Positive for arthralgias, back pain and gait problem. Negative for neck pain and neck stiffness.   Integumentary:  Negative for pallor and rash.   Neurological: Positive for tremors and weakness. Negative for headaches.   Psychiatric/Behavioral: Negative for dysphoric mood and suicidal ideas. The patient is not nervous/anxious.          Objective:      Physical Exam  Constitutional:       General: She is not in acute distress.     Appearance: She is well-developed.   HENT:      Head: Normocephalic and atraumatic.      Right Ear: External ear normal.      Left Ear: External ear normal.      Mouth/Throat:      Pharynx: No oropharyngeal exudate.   Eyes:      General: No scleral icterus.     Conjunctiva/sclera: Conjunctivae normal.      Pupils: Pupils are equal, round, and  reactive to light.   Neck:      Musculoskeletal: Normal range of motion and neck supple.      Thyroid: No thyromegaly.   Cardiovascular:      Rate and Rhythm: Normal rate and regular rhythm.      Heart sounds: No murmur.   Pulmonary:      Effort: Pulmonary effort is normal.      Breath sounds: Normal breath sounds. No wheezing.   Abdominal:      General: Bowel sounds are normal. There is no distension.      Palpations: Abdomen is soft.      Tenderness: There is no abdominal tenderness.   Musculoskeletal:         General: Tenderness ( low back) and deformity (Hand arthritis) present.   Lymphadenopathy:      Cervical: No cervical adenopathy.   Skin:     Findings: No rash.   Neurological:      Mental Status: She is alert and oriented to person, place, and time.      Gait: Gait abnormal.   Psychiatric:         Mood and Affect: Mood normal.         Behavior: Behavior normal.         Assessment:       1. Myasthenia gravis status post thymectomy    2. Peripheral polyneuropathy    3. 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 Essential hypertension    5. Uncontrolled type 2 diabetes mellitus with hyperglycemia        Plan:       Teresita was seen today for annual exam.    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  -     Basic metabolic panel; Future  -     Hemoglobin A1C; Future  -     Lipid Panel; Future    Other orders  -     blood sugar diagnostic (ONETOUCH ULTRA TEST) Strp; Tests sugar 2 times daily.

## 2020-07-24 ENCOUNTER — TELEPHONE (OUTPATIENT)
Dept: INTERNAL MEDICINE | Facility: CLINIC | Age: 80
End: 2020-07-24

## 2020-07-24 NOTE — TELEPHONE ENCOUNTER
----- Message from Eloisa Casas sent at 7/24/2020 12:05 PM CDT -----  Contact: Self [phone]  Patient is returning a phone call.  Who left a message for the patient: Alondra  Does patient know what this is regarding:  Missed call  Comments:

## 2020-07-24 NOTE — TELEPHONE ENCOUNTER
----- Message from Meet Barrera MD sent at 7/23/2020 12:05 PM CDT -----  Need help with Medicare form to test sugars twice daily.

## 2020-07-24 NOTE — TELEPHONE ENCOUNTER
Spoke to pt. She needs a CMN form completed.     [facility]. They are faxing over the form today.

## 2020-07-27 NOTE — TELEPHONE ENCOUNTER
Filled out CMN form. Only needs signature and date. I will fax when complete.     Forms is your purple folder.

## 2020-07-28 ENCOUNTER — TELEPHONE (OUTPATIENT)
Dept: INTERNAL MEDICINE | Facility: CLINIC | Age: 80
End: 2020-07-28

## 2020-07-28 NOTE — TELEPHONE ENCOUNTER
----- Message from Lucina Landis sent at 7/28/2020 10:48 AM CDT -----  Contact: self/[phone]  Patient is returning a phone call.  Who left a message for the patient: Dr Barrera's nurse  Does patient know what this is regarding:  no  Comments: patient stated that she just checked her message and found a missed call.

## 2020-08-12 ENCOUNTER — OFFICE VISIT (OUTPATIENT)
Dept: RHEUMATOLOGY | Facility: CLINIC | Age: 80
End: 2020-08-12
Payer: MEDICARE

## 2020-08-12 ENCOUNTER — LAB VISIT (OUTPATIENT)
Dept: LAB | Facility: HOSPITAL | Age: 80
End: 2020-08-12
Attending: INTERNAL MEDICINE
Payer: MEDICARE

## 2020-08-12 VITALS
BODY MASS INDEX: 23.53 KG/M2 | SYSTOLIC BLOOD PRESSURE: 153 MMHG | HEART RATE: 78 BPM | DIASTOLIC BLOOD PRESSURE: 71 MMHG | HEIGHT: 62 IN | WEIGHT: 127.88 LBS

## 2020-08-12 DIAGNOSIS — Z79.899 HIGH RISK MEDICATION USE: Chronic | ICD-10-CM

## 2020-08-12 DIAGNOSIS — M81.0 SENILE OSTEOPOROSIS: ICD-10-CM

## 2020-08-12 DIAGNOSIS — M05.79 RHEUMATOID ARTHRITIS INVOLVING MULTIPLE SITES WITH POSITIVE RHEUMATOID FACTOR: ICD-10-CM

## 2020-08-12 LAB
ALBUMIN SERPL BCP-MCNC: 4.1 G/DL (ref 3.5–5.2)
ALP SERPL-CCNC: 49 U/L (ref 55–135)
ALT SERPL W/O P-5'-P-CCNC: 24 U/L (ref 10–44)
ANION GAP SERPL CALC-SCNC: 10 MMOL/L (ref 8–16)
AST SERPL-CCNC: 21 U/L (ref 10–40)
BASOPHILS # BLD AUTO: 0.05 K/UL (ref 0–0.2)
BASOPHILS NFR BLD: 0.5 % (ref 0–1.9)
BILIRUB SERPL-MCNC: 0.7 MG/DL (ref 0.1–1)
BUN SERPL-MCNC: 9 MG/DL (ref 8–23)
CALCIUM SERPL-MCNC: 8.9 MG/DL (ref 8.7–10.5)
CHLORIDE SERPL-SCNC: 109 MMOL/L (ref 95–110)
CO2 SERPL-SCNC: 25 MMOL/L (ref 23–29)
CREAT SERPL-MCNC: 0.9 MG/DL (ref 0.5–1.4)
CRP SERPL-MCNC: 4.8 MG/L (ref 0–8.2)
DIFFERENTIAL METHOD: ABNORMAL
EOSINOPHIL # BLD AUTO: 0.2 K/UL (ref 0–0.5)
EOSINOPHIL NFR BLD: 1.6 % (ref 0–8)
ERYTHROCYTE [DISTWIDTH] IN BLOOD BY AUTOMATED COUNT: 13.5 % (ref 11.5–14.5)
ERYTHROCYTE [SEDIMENTATION RATE] IN BLOOD BY WESTERGREN METHOD: 33 MM/HR (ref 0–36)
EST. GFR  (AFRICAN AMERICAN): >60 ML/MIN/1.73 M^2
EST. GFR  (NON AFRICAN AMERICAN): >60 ML/MIN/1.73 M^2
GLUCOSE SERPL-MCNC: 151 MG/DL (ref 70–110)
HCT VFR BLD AUTO: 45.1 % (ref 37–48.5)
HGB BLD-MCNC: 13.3 G/DL (ref 12–16)
IMM GRANULOCYTES # BLD AUTO: 0.05 K/UL (ref 0–0.04)
IMM GRANULOCYTES NFR BLD AUTO: 0.5 % (ref 0–0.5)
LYMPHOCYTES # BLD AUTO: 0.8 K/UL (ref 1–4.8)
LYMPHOCYTES NFR BLD: 8.7 % (ref 18–48)
MCH RBC QN AUTO: 28.1 PG (ref 27–31)
MCHC RBC AUTO-ENTMCNC: 29.5 G/DL (ref 32–36)
MCV RBC AUTO: 95 FL (ref 82–98)
MONOCYTES # BLD AUTO: 0.2 K/UL (ref 0.3–1)
MONOCYTES NFR BLD: 1.6 % (ref 4–15)
NEUTROPHILS # BLD AUTO: 8 K/UL (ref 1.8–7.7)
NEUTROPHILS NFR BLD: 87.1 % (ref 38–73)
NRBC BLD-RTO: 0 /100 WBC
PLATELET # BLD AUTO: 248 K/UL (ref 150–350)
PMV BLD AUTO: 10.8 FL (ref 9.2–12.9)
POTASSIUM SERPL-SCNC: 3.7 MMOL/L (ref 3.5–5.1)
PROT SERPL-MCNC: 7.8 G/DL (ref 6–8.4)
RBC # BLD AUTO: 4.73 M/UL (ref 4–5.4)
SODIUM SERPL-SCNC: 144 MMOL/L (ref 136–145)
WBC # BLD AUTO: 9.19 K/UL (ref 3.9–12.7)

## 2020-08-12 PROCEDURE — 99214 OFFICE O/P EST MOD 30 MIN: CPT | Mod: S$PBB,,, | Performed by: INTERNAL MEDICINE

## 2020-08-12 PROCEDURE — 99213 OFFICE O/P EST LOW 20 MIN: CPT | Mod: PBBFAC | Performed by: INTERNAL MEDICINE

## 2020-08-12 PROCEDURE — 99999 PR PBB SHADOW E&M-EST. PATIENT-LVL III: CPT | Mod: PBBFAC,,, | Performed by: INTERNAL MEDICINE

## 2020-08-12 PROCEDURE — 86140 C-REACTIVE PROTEIN: CPT

## 2020-08-12 PROCEDURE — 80053 COMPREHEN METABOLIC PANEL: CPT

## 2020-08-12 PROCEDURE — 85652 RBC SED RATE AUTOMATED: CPT

## 2020-08-12 PROCEDURE — 85025 COMPLETE CBC W/AUTO DIFF WBC: CPT

## 2020-08-12 PROCEDURE — 36415 COLL VENOUS BLD VENIPUNCTURE: CPT

## 2020-08-12 PROCEDURE — 99999 PR PBB SHADOW E&M-EST. PATIENT-LVL III: ICD-10-PCS | Mod: PBBFAC,,, | Performed by: INTERNAL MEDICINE

## 2020-08-12 PROCEDURE — 99214 PR OFFICE/OUTPT VISIT, EST, LEVL IV, 30-39 MIN: ICD-10-PCS | Mod: S$PBB,,, | Performed by: INTERNAL MEDICINE

## 2020-08-12 ASSESSMENT — ROUTINE ASSESSMENT OF PATIENT INDEX DATA (RAPID3)
PAIN SCORE: 5
TOTAL RAPID3 SCORE: 4.55
FATIGUE SCORE: 5
MDHAQ FUNCTION SCORE: 1.1
PATIENT GLOBAL ASSESSMENT SCORE: 5

## 2020-08-12 NOTE — ASSESSMENT & PLAN NOTE
Needs CBC and liver enzymes for MTX but have been previously been normal     Will cont q3m cbc/cmp for mtx

## 2020-08-12 NOTE — PROGRESS NOTES
"Subjective:       Patient ID: Teresita Tolbert is a 79 y.o. female.    Chief Complaint: Disease Management    F/u RA, seropos on MTX 10 mg/week since May 2015  F/u osteoporosis with lumbar compressions   Prolia since 2016; last Aug 2019       PMH: osteoporosis; was on alendronate , switched to Prolia q6m since 2016  PMH: myasthenia gravis on prednisone and previously azathioprine until started MTX for RA       Currently MTX 7.5 mg/ week  Prednisone 5 mg qd (for MG)  Vit D3 daily  Folic acid 1 mg daily  Prolia 60 mg q6m; last was Feb 27    Sometimes low back pain when lifts or bends forward    Review of Systems   Constitutional: Negative for fatigue, fever and unexpected weight change.   HENT: Positive for trouble swallowing. Negative for mouth sores.         Dry mouth   Eyes: Negative for redness.        Dry eyes   Respiratory: Negative for cough and shortness of breath.    Cardiovascular: Negative for chest pain.   Gastrointestinal: Positive for diarrhea. Negative for abdominal pain and constipation.   Genitourinary: Negative for dysuria and genital sores.   Skin: Negative for color change and rash.   Neurological: Negative for headaches.   Hematological: Negative for adenopathy. Does not bruise/bleed easily.   Psychiatric/Behavioral: Positive for sleep disturbance.        "I don't sleep good"; naps during day; feels tired; may be disturbed by son's schedule         Objective:   BP (!) 153/71   Pulse 78   Ht 5' 2" (1.575 m)   Wt 58 kg (127 lb 13.9 oz)   BMI 23.39 kg/m²      Physical Exam   Constitutional: She is well-developed, well-nourished, and in no distress.   Eyes: Conjunctivae are normal.   Cardiovascular: Normal rate and regular rhythm.    Pulmonary/Chest: No respiratory distress. She has no wheezes. She has no rales.   No increased work of breathing  Thoracic kyphosis   Lymphadenopathy:     She has no cervical adenopathy.   Neurological:   Alert, awake, with no abnormal movements   Skin: No rash noted. "     No rash on face; skin clear   Psychiatric:   Normal mood and affect; clear, rational thinking; speech normal and not pressured   Musculoskeletal:      Comments: Ulnar deviation bilaterally digits 3, 4, 5  Thumb deformities           2/27/2020 8/12/2020   ARREDONDO-28 (ESR) 3.35 (Moderate disease activity) --   ARREDONDO-28 (CRP) 2.1 (Remission) --   Tender (ARREDONDO-28) 0 / 28  0 / 28    Swollen (ARREDONDO-28) 0 / 28  0 / 28    Provider Global 20 mm --   Patient Global 55 mm --   ESR 40 mm/hr --   CRP 1.8 mg/L --     Lab Results   Component Value Date    SEDRATE 41 (H) 05/27/2020          Assessment:       1. Rheumatoid arthritis involving multiple sites with positive rheumatoid factor    2. Senile osteoporosis    3. High risk medication use            Plan:       Problem List Items Addressed This Visit        Active Problems    Rheumatoid arthritis involving multiple sites with positive rheumatoid factor     RA appears controlled on MTX and 5 mg prednisone    I would discontinue prednisone if she did not have myasthenia    Needs labs today for mtx and also get ESR/CRP for RA    Otherwise cont current regimen with q3m lab and RTC me 6 mo         Senile osteoporosis     On prolia; former patient of Dr Mosher; will need f/u DXA later this year         High risk medication use (Chronic)     Needs CBC and liver enzymes for MTX but have been previously been normal     Will cont q3m cbc/cmp for mtx             Rapid3 Question Responses and Scores 5/7/2018   MDHAQ Score 1.2   Psychologic Score 0   Pain Score 5   When you awakened in the morning OVER THE LAST WEEK, did you feel stiff? Yes   If Yes, please indicate the number of hours until you are as limber as you will be for the day 0.3   Fatigue Score 4.5   Global Health Score 4.5   RAPID3 Score 4.5

## 2020-08-12 NOTE — ASSESSMENT & PLAN NOTE
RA appears controlled on MTX and 5 mg prednisone    I would discontinue prednisone if she did not have myasthenia    Needs labs today for mtx and also get ESR/CRP for RA    Otherwise cont current regimen with q3m lab and RTC me 6 mo

## 2020-08-25 ENCOUNTER — OFFICE VISIT (OUTPATIENT)
Dept: OPHTHALMOLOGY | Facility: CLINIC | Age: 80
End: 2020-08-25
Payer: MEDICARE

## 2020-08-25 DIAGNOSIS — H35.373 EPIRETINAL MEMBRANE (ERM) OF BOTH EYES: ICD-10-CM

## 2020-08-25 DIAGNOSIS — H43.813 POSTERIOR VITREOUS DETACHMENT OF BOTH EYES: ICD-10-CM

## 2020-08-25 DIAGNOSIS — E11.3513 CONTROLLED TYPE 2 DIABETES MELLITUS WITH BOTH EYES AFFECTED BY PROLIFERATIVE RETINOPATHY AND MACULAR EDEMA, WITHOUT LONG-TERM CURRENT USE OF INSULIN: Primary | ICD-10-CM

## 2020-08-25 PROCEDURE — 92014 PR EYE EXAM, EST PATIENT,COMPREHESV: ICD-10-PCS | Mod: S$PBB,,, | Performed by: OPHTHALMOLOGY

## 2020-08-25 PROCEDURE — 99999 PR PBB SHADOW E&M-EST. PATIENT-LVL III: CPT | Mod: PBBFAC,,, | Performed by: OPHTHALMOLOGY

## 2020-08-25 PROCEDURE — 99213 OFFICE O/P EST LOW 20 MIN: CPT | Mod: PBBFAC,25 | Performed by: OPHTHALMOLOGY

## 2020-08-25 PROCEDURE — 92202 PR OPHTHALMOSCOPY, EXT, W/DRAW OPTIC NERVE/MACULA, I&R, UNI/BI: ICD-10-PCS | Mod: S$PBB,,, | Performed by: OPHTHALMOLOGY

## 2020-08-25 PROCEDURE — 99999 PR PBB SHADOW E&M-EST. PATIENT-LVL III: ICD-10-PCS | Mod: PBBFAC,,, | Performed by: OPHTHALMOLOGY

## 2020-08-25 PROCEDURE — 92202 OPSCPY EXTND ON/MAC DRAW: CPT | Mod: PBBFAC | Performed by: OPHTHALMOLOGY

## 2020-08-25 PROCEDURE — 92014 COMPRE OPH EXAM EST PT 1/>: CPT | Mod: S$PBB,,, | Performed by: OPHTHALMOLOGY

## 2020-08-25 PROCEDURE — 92134 POSTERIOR SEGMENT OCT RETINA (OCULAR COHERENCE TOMOGRAPHY)-BOTH EYES: ICD-10-PCS | Mod: 26,S$PBB,, | Performed by: OPHTHALMOLOGY

## 2020-08-25 PROCEDURE — 92134 CPTRZ OPH DX IMG PST SGM RTA: CPT | Mod: PBBFAC | Performed by: OPHTHALMOLOGY

## 2020-08-25 PROCEDURE — 92202 OPSCPY EXTND ON/MAC DRAW: CPT | Mod: S$PBB,,, | Performed by: OPHTHALMOLOGY

## 2020-08-25 NOTE — PROGRESS NOTES
HPI     6 mo oct  DLS-10/15/19 Dr. Olsen     Pt sts no changes still having same blurred va slight itching at times.  Denies pain   (-)Flashes (-)Floaters rarely  (-)Photophobia  (-)Glare    Systane ou qid      OCT - DME OU - good focal OU  Stable from prior    Prior FA shows good perfusion time OU, multiple late leakage points in macula OU, staining of peripheral laser scars OU, mild late leakage from nerve OS      A/P    1-PDR OS>OD:   - S/p PRP OD (06/16/15)   - S/p PRP OS (07/07/15)   - Last HbA1c was 6.4 on 4/8/15   - Emphasized the importance of tight glucose control    2-DME OU   - s/p focal OU   -  Increase in cystoid fluid OS  S/p resumed avastin OD x 4, OS x 11  S/p Ozurdex OD x 1 3/29/19  3/18 - increased DME OD, stable OS  6/18 - q 3 month Avastin working OU      Only mild IOP elevation OD after ozurdex, monitor  Watch OS as well   Consider Ozurdex OU if recurs - can add Cosopt as well if IOP elevated    Stable today  Continue obs      3-PCIOL OU    4-Ectropion OU:  Saw Mary for repair  Incomplete closer with some moderate inf PEEs         12 months OCT

## 2020-08-31 ENCOUNTER — INFUSION (OUTPATIENT)
Dept: INFECTIOUS DISEASES | Facility: HOSPITAL | Age: 80
End: 2020-08-31
Attending: INTERNAL MEDICINE
Payer: MEDICARE

## 2020-08-31 VITALS
DIASTOLIC BLOOD PRESSURE: 66 MMHG | BODY MASS INDEX: 23.53 KG/M2 | SYSTOLIC BLOOD PRESSURE: 147 MMHG | HEIGHT: 62 IN | HEART RATE: 69 BPM | WEIGHT: 127.88 LBS

## 2020-08-31 DIAGNOSIS — M81.0 SENILE OSTEOPOROSIS: Primary | ICD-10-CM

## 2020-08-31 PROCEDURE — 63600175 PHARM REV CODE 636 W HCPCS: Mod: JG | Performed by: INTERNAL MEDICINE

## 2020-08-31 PROCEDURE — 96372 THER/PROPH/DIAG INJ SC/IM: CPT

## 2020-08-31 RX ADMIN — DENOSUMAB 60 MG: 60 INJECTION SUBCUTANEOUS at 11:08

## 2020-09-02 ENCOUNTER — TELEPHONE (OUTPATIENT)
Dept: INTERNAL MEDICINE | Facility: CLINIC | Age: 80
End: 2020-09-02

## 2020-09-17 NOTE — PROGRESS NOTES
Subjective:      Patient ID: Teresita Tolbert is a 79 y.o. female.    Chief Complaint:  Follow-up    History of Present Illness  Teresita Tolbert is here for follow up of osteoporosis and T2DM.  Previously seen by Dr. Mosher.  Last seen 1/23/2019.  This is their first visit with me.      She had myasthenia diagnosed in 1979, underwent a thymectomy in 1980 and has been on chronic   steroid administration since. She takes 10 mg prednisone every other day.     With regards to osteoporosis:     BMD: 10/12/2018  The bone mineral density within the lumbar spine measured from L1 through L4 is equal to 1.091 g/cm squared with a T score of -0.8 and a Z score of 0.3.  The left femoral neck bone mineral density is equal to 0.695 g/cm squared with a T score of -2.5 and a Z score of -1.3.  The right femoral neck bone mineral density is equal to 0.765 g/cm squared with a T score of -2.0 and a Z score of -0.8.  The left total hip bone mineral density is equal to 0.769 g/cm squared with a T score of -1.9 and a Z score of -0.9.  The right total hip bone mineral density is equal to 0.748 g/cm squared with a T score of -2.1 and a Z score of -1.1.  There is a 12.8% risk of a major osteoporotic fracture and a 3.9% risk of hip fracture in the next 10 years (FRAX).  IMPRESSION:   Osteoporosis.    Medications:   Prolia (started 2016) last dose 8/31/2020  Calcium intake: None  Vit D intake: OTC Vit D3 1000iu daily    Weight bearing exercise: Denies  Falls: Denies any in the last 12 months.  Fractures: Denies any in the last 12 months.   Significant height loss (>2 inches): Unsure.     Family history: Unsure.    Menopause: Unsure of age.    Tobacco Use: Denies  Alcohol Use: Denies  Glucocorticoid History: Yes - prednisone 10mg every other day  Anticoagulant Use: + Aspirin  GERD/PPI Use: + Prilosec  History of Malabsorption: Denies  Antiseizure Medications: Denies  History of Thyroid Disease: Denies  Kidney Stones/ Kidney Disease:  Denies  Personal history of kidney stones: Denies  Family history of kidney stones: Denies  Family history of bone disease or fracture: Denies    Denies point tenderness along spine  Denies bilateral hip tenderness  Gait - unsteady with cane    Lab Results   Component Value Date    CALCIUM 8.9 08/12/2020    PHOS 3.6 10/05/2006     Vit D, 25-Hydroxy   Date Value Ref Range Status   02/27/2020 75 30 - 96 ng/mL Final     Comment:     Vitamin D deficiency.........<10 ng/mL                              Vitamin D insufficiency......10-29 ng/mL       Vitamin D sufficiency........> or equal to 30 ng/mL  Vitamin D toxicity............>100 ng/mL       With regards to diabetes:    Follows with PCP.    Diagnosed: 1980s  Known complications:  DKA -  RN -  PN +  Nephropathy -  CAD -     Current regimen:  Janumet  one tablet BID    Other medications tried:  Insulin - hypoglycemia (stopped in 2018)    Glucose Monitor:   1 times a day testing  Log reviewed: oral recall  120s    Hypoglycemia:  Denies  Knows how to correct with 15 grams of carbs- juice, coke, or a peppermint.     Diet/Exercise:  Eats 2-3 meals a day.   B: cream of wheat, OJ  L and D: meat, vegetable, mashed potato or pasta/ stew  Snacks : occasionally - candy, donuts  Drinks : water  Exercise: None.     Education - last visit: None  Eye Exam: 8/25/2020  Podiatry: None    Diabetes Management Status    Hemoglobin A1C   Date Value Ref Range Status   07/23/2020 7.0 (H) 4.0 - 5.6 % Final     Comment:     ADA Screening Guidelines:  5.7-6.4%  Consistent with prediabetes  >or=6.5%  Consistent with diabetes  High levels of fetal hemoglobin interfere with the HbA1C  assay. Heterozygous hemoglobin variants (HbS, HgC, etc)do  not significantly interfere with this assay.   However, presence of multiple variants may affect accuracy.     01/23/2019 7.9 (H) 4.0 - 5.6 % Final     Comment:     ADA Screening Guidelines:  5.7-6.4%  Consistent with prediabetes  >or=6.5%  Consistent  with diabetes  High levels of fetal hemoglobin interfere with the HbA1C  assay. Heterozygous hemoglobin variants (HbS, HgC, etc)do  not significantly interfere with this assay.   However, presence of multiple variants may affect accuracy.     01/30/2018 7.2 (H) 4.0 - 5.6 % Final     Comment:     According to ADA guidelines, hemoglobin A1c <7.0% represents  optimal control in non-pregnant diabetic patients. Different  metrics may apply to specific patient populations.   Standards of Medical Care in Diabetes-2016.  For the purpose of screening for the presence of diabetes:  <5.7%     Consistent with the absence of diabetes  5.7-6.4%  Consistent with increasing risk for diabetes   (prediabetes)  >or=6.5%  Consistent with diabetes  Currently, no consensus exists for use of hemoglobin A1c  for diagnosis of diabetes for children.  This Hemoglobin A1c assay has significant interference with fetal   hemoglobin   (HbF). The results are invalid for patients with abnormal amounts of   HbF,   including those with known Hereditary Persistence   of Fetal Hemoglobin. Heterozygous hemoglobin variants (HbAS, HbAC,   HbAD, HbAE, HbA2) do not significantly interfere with this assay;   however, presence of multiple variants in a sample may impact the %   interference.         Statin: Not taking  ACE/ARB: Not taking  Screening or Prevention Patient's value Goal Complete/Controlled?   HgA1C Testing and Control   Lab Results   Component Value Date    HGBA1C 7.0 (H) 07/23/2020      Annually/Less than 8% Yes   Lipid profile : 07/23/2020 Annually Yes   LDL control Lab Results   Component Value Date    LDLCALC 76.0 07/23/2020    Annually/Less than 100 mg/dl  Yes   Nephropathy screening Lab Results   Component Value Date    LABMICR 13.0 05/31/2017     Lab Results   Component Value Date    PROTEINUA Negative 04/01/2011    Annually No   Blood pressure BP Readings from Last 1 Encounters:   09/21/20 (!) 146/76    Less than 140/90 No   Dilated  "retinal exam : 08/25/2020 Annually Yes   Foot exam   Most Recent Foot Exam Date: Not Found Annually No     Review of Systems   Constitutional: Negative for fatigue.   Eyes: Negative for visual disturbance.   Respiratory: Negative for shortness of breath.    Cardiovascular: Negative for chest pain.   Gastrointestinal: Negative for abdominal pain.   Musculoskeletal: Negative for arthralgias.   Skin: Negative for wound.   Neurological: Negative for headaches.   Hematological: Does not bruise/bleed easily.   Psychiatric/Behavioral: Negative for sleep disturbance.     Objective:   Physical Exam  Vitals signs reviewed.   Neck:      Thyroid: No thyromegaly.   Cardiovascular:      Rate and Rhythm: Normal rate.      Comments: No edema present  Pulmonary:      Effort: Pulmonary effort is normal.   Abdominal:      Palpations: Abdomen is soft.       Visit Vitals  BP (!) 146/76 (BP Location: Right arm)   Pulse 69   Ht 5' 1" (1.549 m)   Wt 56.5 kg (124 lb 10.7 oz)   BMI 23.56 kg/m²       Body mass index is 23.56 kg/m².    Lab Review:   Lab Results   Component Value Date    HGBA1C 7.0 (H) 07/23/2020    HGBA1C 7.9 (H) 01/23/2019    HGBA1C 7.2 (H) 01/30/2018       Lab Results   Component Value Date    CHOL 146 07/23/2020    HDL 43 07/23/2020    LDLCALC 76.0 07/23/2020    TRIG 135 07/23/2020    CHOLHDL 29.5 07/23/2020     Lab Results   Component Value Date     08/12/2020    K 3.7 08/12/2020     08/12/2020    CO2 25 08/12/2020     (H) 08/12/2020    BUN 9 08/12/2020    CREATININE 0.9 08/12/2020    CALCIUM 8.9 08/12/2020    PROT 7.8 08/12/2020    ALBUMIN 4.1 08/12/2020    BILITOT 0.7 08/12/2020    ALKPHOS 49 (L) 08/12/2020    AST 21 08/12/2020    ALT 24 08/12/2020    ANIONGAP 10 08/12/2020    ESTGFRAFRICA >60.0 08/12/2020    EGFRNONAA >60.0 08/12/2020    TSH 1.717 03/24/2017     Vit D, 25-Hydroxy   Date Value Ref Range Status   02/27/2020 75 30 - 96 ng/mL Final     Comment:     Vitamin D deficiency.........<10 ng/mL   "                            Vitamin D insufficiency......10-29 ng/mL       Vitamin D sufficiency........> or equal to 30 ng/mL  Vitamin D toxicity............>100 ng/mL       Assessment and Plan     1. Senile osteoporosis  DXA Bone Density Spine And Hip   2. Uncontrolled type 2 diabetes mellitus with hyperglycemia     3. Essential hypertension     4. Peripheral polyneuropathy     5. 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  -- Reviewed basics of quantity versus quality.  -- Reassuring they are not fracturing.  -- Recommend:  -Pharmacological therapy is recommended for patients with osteopenia if the 10 year probability of a hip fracture is >3% and 10 year probability of other major osteoporotic fractures is >20%.  Treatment options and potential side effects discussed for PO bisphosphonates, reclast, Denosumab, and Teriparatide.   -Treatment: Prolia (started 2016) last dose 8/31/2020.  -Calcium and Vitamin D RDD provided.  -Fall precautions made in the home.  -Alerted that if dental work needs to be done it should be done prior to initiating therapy. Dental health: UTD.  -- Repeat DEXA scan 10/12/2020.    Diabetes mellitus out of control  -- Labs in 3 months.  -- A1c goal 7.5-8%.  -- Medications discussed:  MFM   GLP1-DPP4  -- Reviewed logs/CGM:  Reported glucose unremarkable.  A1c at goal given age and co morbidities.  -- Medication Changes:   CONTINUE:  Janumet  one tablet BID  -- Reviewed goals of therapy are to get the best control we can without hypoglycemia.  -- Reviewed patient's current insulin regimen. Clarified proper insulin dose and timing in relation to meals, etc. Insulin injection sites and proper rotation instructed.    -- Advised  frequent self blood glucose monitoring.  Patient encouraged to document glucose results and bring them to every clinic visit.  -- Hypoglycemia precautions discussed. Instructed on precautions before driving.    -- Call for Bg repeatedly < 90 or > 180.   -- Close adherence to lifestyle changes recommended.   -- Periodic follow ups for eye evaluations, foot care and dental care suggested.    HTN (hypertension)  -- Elevated today.    -- Discussed BP goals.    PN (peripheral neuropathy)  -- Optimize glucose control.     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     Follow up in about 1 year (around 9/21/2021).

## 2020-09-21 ENCOUNTER — OFFICE VISIT (OUTPATIENT)
Dept: ENDOCRINOLOGY | Facility: CLINIC | Age: 80
End: 2020-09-21
Payer: MEDICARE

## 2020-09-21 VITALS
WEIGHT: 124.69 LBS | HEIGHT: 61 IN | HEART RATE: 69 BPM | BODY MASS INDEX: 23.54 KG/M2 | SYSTOLIC BLOOD PRESSURE: 146 MMHG | DIASTOLIC BLOOD PRESSURE: 76 MMHG

## 2020-09-21 DIAGNOSIS — E11.65 UNCONTROLLED TYPE 2 DIABETES MELLITUS WITH HYPERGLYCEMIA: ICD-10-CM

## 2020-09-21 DIAGNOSIS — M81.0 SENILE OSTEOPOROSIS: Primary | ICD-10-CM

## 2020-09-21 DIAGNOSIS — I10 ESSENTIAL HYPERTENSION: ICD-10-CM

## 2020-09-21 DIAGNOSIS — E11.3513 CONTROLLED TYPE 2 DIABETES MELLITUS WITH BOTH EYES AFFECTED BY PROLIFERATIVE RETINOPATHY AND MACULAR EDEMA, WITHOUT LONG-TERM CURRENT USE OF INSULIN: ICD-10-CM

## 2020-09-21 DIAGNOSIS — G62.9 PERIPHERAL POLYNEUROPATHY: ICD-10-CM

## 2020-09-21 PROCEDURE — 99215 OFFICE O/P EST HI 40 MIN: CPT | Mod: PBBFAC | Performed by: NURSE PRACTITIONER

## 2020-09-21 PROCEDURE — 99999 PR PBB SHADOW E&M-EST. PATIENT-LVL V: ICD-10-PCS | Mod: PBBFAC,,, | Performed by: NURSE PRACTITIONER

## 2020-09-21 PROCEDURE — 99214 PR OFFICE/OUTPT VISIT, EST, LEVL IV, 30-39 MIN: ICD-10-PCS | Mod: S$PBB,,, | Performed by: NURSE PRACTITIONER

## 2020-09-21 PROCEDURE — 99999 PR PBB SHADOW E&M-EST. PATIENT-LVL V: CPT | Mod: PBBFAC,,, | Performed by: NURSE PRACTITIONER

## 2020-09-21 PROCEDURE — 99214 OFFICE O/P EST MOD 30 MIN: CPT | Mod: S$PBB,,, | Performed by: NURSE PRACTITIONER

## 2020-09-21 NOTE — ASSESSMENT & PLAN NOTE
-- Risks include menopause, glucocorticoid use, PPI therapy, chronic malabsorption.  -- Reviewed basics of quantity versus quality.  -- Reassuring they are not fracturing.  -- Recommend:  -Pharmacological therapy is recommended for patients with osteopenia if the 10 year probability of a hip fracture is >3% and 10 year probability of other major osteoporotic fractures is >20%.  Treatment options and potential side effects discussed for PO bisphosphonates, reclast, Denosumab, and Teriparatide.   -Treatment: Prolia (started 2016) last dose 8/31/2020.  -Calcium and Vitamin D RDD provided.  -Fall precautions made in the home.  -Alerted that if dental work needs to be done it should be done prior to initiating therapy. Dental health: UTD.  -- Repeat DEXA scan 10/12/2020.

## 2020-09-21 NOTE — ASSESSMENT & PLAN NOTE
-- Labs in 3 months.  -- A1c goal 7.5-8%.  -- Medications discussed:  MFM   GLP1-DPP4  -- Reviewed logs/CGM:  Reported glucose unremarkable.  A1c at goal given age and co morbidities.  -- Medication Changes:   CONTINUE:  Janumet  one tablet BID  -- Reviewed goals of therapy are to get the best control we can without hypoglycemia.  -- Reviewed patient's current insulin regimen. Clarified proper insulin dose and timing in relation to meals, etc. Insulin injection sites and proper rotation instructed.    -- Advised frequent self blood glucose monitoring.  Patient encouraged to document glucose results and bring them to every clinic visit.  -- Hypoglycemia precautions discussed. Instructed on precautions before driving.    -- Call for Bg repeatedly < 90 or > 180.   -- Close adherence to lifestyle changes recommended.   -- Periodic follow ups for eye evaluations, foot care and dental care suggested.

## 2020-09-21 NOTE — LETTER
September 21, 2020      Meet Barrera MD  [address]  [facility] [address]           [facility] 6th Fl  [address]  [facility] [address]  Phone: [phone]  Fax: [phone]          Patient: Teresita Tolbert   MR Number: [id number]   YOB: 1940   Date of Visit: 9/21/2020       Dear Dr. Meet Barrera:    Thank you for referring Teresita Tolbert to me for evaluation. Attached you will find relevant portions of my assessment and plan of care.    If you have questions, please do not hesitate to call me. I look forward to following Teresita Tolbert along with you.    Sincerely,    Smiley Lind NP    Enclosure  CC:  No Recipients    If you would like to receive this communication electronically, please contact [email or web address] or [phone] to request more information on [employer] access.    For providers and/or their staff who would like to refer a patient to [facility], please contact us through our one-stop-shop provider referral line, [facility] , at [phone].    If you feel you have received this communication in error or would no longer like to receive these types of communications, please e-mail [email or web address]

## 2020-09-29 ENCOUNTER — PATIENT MESSAGE (OUTPATIENT)
Dept: OTHER | Facility: OTHER | Age: 80
End: 2020-09-29

## 2020-10-05 ENCOUNTER — PES CALL (OUTPATIENT)
Dept: ADMINISTRATIVE | Facility: CLINIC | Age: 80
End: 2020-10-05

## 2020-10-19 ENCOUNTER — HOSPITAL ENCOUNTER (OUTPATIENT)
Dept: RADIOLOGY | Facility: OTHER | Age: 80
Discharge: HOME | End: 2020-10-19
Attending: NURSE PRACTITIONER
Payer: MEDICARE

## 2020-10-19 DIAGNOSIS — M81.0 SENILE OSTEOPOROSIS: ICD-10-CM

## 2020-10-19 PROCEDURE — 77080 DXA BONE DENSITY AXIAL: CPT | Mod: 26,,, | Performed by: RADIOLOGY

## 2020-10-19 PROCEDURE — 77080 DXA BONE DENSITY AXIAL: CPT | Mod: TC

## 2020-10-19 PROCEDURE — 77080 DEXA BONE DENSITY SPINE HIP: ICD-10-PCS | Mod: 26,,, | Performed by: RADIOLOGY

## 2020-10-30 DIAGNOSIS — M05.79 RHEUMATOID ARTHRITIS INVOLVING MULTIPLE SITES WITH POSITIVE RHEUMATOID FACTOR: ICD-10-CM

## 2020-10-30 DIAGNOSIS — Z79.899 HIGH RISK MEDICATION USE: ICD-10-CM

## 2020-10-30 RX ORDER — FOLIC ACID 1 MG/1
1 TABLET ORAL DAILY
Qty: 90 TABLET | Refills: 1 | Status: SHIPPED | OUTPATIENT
Start: 2020-10-30 | End: 2021-10-18

## 2020-10-30 RX ORDER — METHOTREXATE 2.5 MG/1
7.5 TABLET ORAL
Qty: 15 TABLET | Refills: 2 | Status: SHIPPED | OUTPATIENT
Start: 2020-10-30 | End: 2020-11-27 | Stop reason: SDUPTHER

## 2020-11-12 ENCOUNTER — LAB VISIT (OUTPATIENT)
Dept: LAB | Facility: HOSPITAL | Age: 80
End: 2020-11-12
Attending: INTERNAL MEDICINE
Payer: MEDICARE

## 2020-11-12 DIAGNOSIS — Z79.899 HIGH RISK MEDICATION USE: Chronic | ICD-10-CM

## 2020-11-12 LAB
ALBUMIN SERPL BCP-MCNC: 4 G/DL (ref 3.5–5.2)
ALP SERPL-CCNC: 52 U/L (ref 55–135)
ALT SERPL W/O P-5'-P-CCNC: 27 U/L (ref 10–44)
ANION GAP SERPL CALC-SCNC: 11 MMOL/L (ref 8–16)
AST SERPL-CCNC: 22 U/L (ref 10–40)
BASOPHILS # BLD AUTO: 0.05 K/UL (ref 0–0.2)
BASOPHILS NFR BLD: 0.6 % (ref 0–1.9)
BILIRUB SERPL-MCNC: 0.6 MG/DL (ref 0.1–1)
BUN SERPL-MCNC: 12 MG/DL (ref 8–23)
CALCIUM SERPL-MCNC: 9.7 MG/DL (ref 8.7–10.5)
CHLORIDE SERPL-SCNC: 104 MMOL/L (ref 95–110)
CO2 SERPL-SCNC: 31 MMOL/L (ref 23–29)
CREAT SERPL-MCNC: 0.9 MG/DL (ref 0.5–1.4)
CRP SERPL-MCNC: 1.1 MG/L (ref 0–8.2)
DIFFERENTIAL METHOD: ABNORMAL
EOSINOPHIL # BLD AUTO: 0.1 K/UL (ref 0–0.5)
EOSINOPHIL NFR BLD: 1 % (ref 0–8)
ERYTHROCYTE [DISTWIDTH] IN BLOOD BY AUTOMATED COUNT: 13.9 % (ref 11.5–14.5)
ERYTHROCYTE [SEDIMENTATION RATE] IN BLOOD BY WESTERGREN METHOD: 15 MM/HR (ref 0–36)
EST. GFR  (AFRICAN AMERICAN): >60 ML/MIN/1.73 M^2
EST. GFR  (NON AFRICAN AMERICAN): >60 ML/MIN/1.73 M^2
GLUCOSE SERPL-MCNC: 119 MG/DL (ref 70–110)
HCT VFR BLD AUTO: 43.7 % (ref 37–48.5)
HGB BLD-MCNC: 12.8 G/DL (ref 12–16)
IMM GRANULOCYTES # BLD AUTO: 0.05 K/UL (ref 0–0.04)
IMM GRANULOCYTES NFR BLD AUTO: 0.6 % (ref 0–0.5)
LYMPHOCYTES # BLD AUTO: 1 K/UL (ref 1–4.8)
LYMPHOCYTES NFR BLD: 11.4 % (ref 18–48)
MCH RBC QN AUTO: 27.5 PG (ref 27–31)
MCHC RBC AUTO-ENTMCNC: 29.3 G/DL (ref 32–36)
MCV RBC AUTO: 94 FL (ref 82–98)
MONOCYTES # BLD AUTO: 0.1 K/UL (ref 0.3–1)
MONOCYTES NFR BLD: 1.6 % (ref 4–15)
NEUTROPHILS # BLD AUTO: 7.4 K/UL (ref 1.8–7.7)
NEUTROPHILS NFR BLD: 84.8 % (ref 38–73)
NRBC BLD-RTO: 0 /100 WBC
PLATELET # BLD AUTO: 224 K/UL (ref 150–350)
PMV BLD AUTO: 12 FL (ref 9.2–12.9)
POTASSIUM SERPL-SCNC: 4.5 MMOL/L (ref 3.5–5.1)
PROT SERPL-MCNC: 7.5 G/DL (ref 6–8.4)
RBC # BLD AUTO: 4.66 M/UL (ref 4–5.4)
SODIUM SERPL-SCNC: 146 MMOL/L (ref 136–145)
WBC # BLD AUTO: 8.72 K/UL (ref 3.9–12.7)

## 2020-11-12 PROCEDURE — 85025 COMPLETE CBC W/AUTO DIFF WBC: CPT

## 2020-11-12 PROCEDURE — 86140 C-REACTIVE PROTEIN: CPT

## 2020-11-12 PROCEDURE — 36415 COLL VENOUS BLD VENIPUNCTURE: CPT | Mod: PN

## 2020-11-12 PROCEDURE — 80053 COMPREHEN METABOLIC PANEL: CPT

## 2020-11-12 PROCEDURE — 85652 RBC SED RATE AUTOMATED: CPT

## 2020-11-27 DIAGNOSIS — M05.79 RHEUMATOID ARTHRITIS INVOLVING MULTIPLE SITES WITH POSITIVE RHEUMATOID FACTOR: ICD-10-CM

## 2020-11-27 RX ORDER — METHOTREXATE 2.5 MG/1
7.5 TABLET ORAL
Qty: 15 TABLET | Refills: 2 | Status: SHIPPED | OUTPATIENT
Start: 2020-11-27 | End: 2020-12-28 | Stop reason: SDUPTHER

## 2020-12-11 ENCOUNTER — PATIENT MESSAGE (OUTPATIENT)
Dept: OTHER | Facility: OTHER | Age: 80
End: 2020-12-11

## 2020-12-28 DIAGNOSIS — E11.65 UNCONTROLLED TYPE 2 DIABETES MELLITUS WITH HYPERGLYCEMIA: ICD-10-CM

## 2020-12-28 DIAGNOSIS — M05.79 RHEUMATOID ARTHRITIS INVOLVING MULTIPLE SITES WITH POSITIVE RHEUMATOID FACTOR: ICD-10-CM

## 2020-12-28 RX ORDER — SITAGLIPTIN AND METFORMIN HYDROCHLORIDE 500; 50 MG/1; MG/1
1 TABLET, FILM COATED ORAL 2 TIMES DAILY
Qty: 60 TABLET | Refills: 12 | Status: SHIPPED | OUTPATIENT
Start: 2020-12-28 | End: 2022-01-12

## 2020-12-28 RX ORDER — METHOTREXATE 2.5 MG/1
7.5 TABLET ORAL
Qty: 15 TABLET | Refills: 2 | Status: SHIPPED | OUTPATIENT
Start: 2020-12-28 | End: 2021-04-05 | Stop reason: SDUPTHER

## 2020-12-28 NOTE — TELEPHONE ENCOUNTER
Approved Prescriptions     SITagliptan-metformin (JANUMET)  mg per tablet         Sig: Take 1 tablet by mouth 2 (two) times daily.    Disp:  60 tablet    Refills:  12    Start: 12/28/2020    Class: Normal    Authorized by: Meet Barrera MD    For: Uncontrolled type 2 diabetes mellitus with hyperglycemia        To be filled at: [id number] [id number] [place] [address] AT [facility]

## 2020-12-31 RX ORDER — AMLODIPINE BESYLATE 5 MG/1
5 TABLET ORAL DAILY
Qty: 90 TABLET | Refills: 2 | Status: SHIPPED | OUTPATIENT
Start: 2020-12-31 | End: 2021-10-08

## 2020-12-31 RX ORDER — METOPROLOL SUCCINATE 50 MG/1
50 TABLET, EXTENDED RELEASE ORAL DAILY
Qty: 90 TABLET | Refills: 2 | Status: SHIPPED | OUTPATIENT
Start: 2020-12-31 | End: 2021-10-08

## 2021-01-14 ENCOUNTER — PATIENT MESSAGE (OUTPATIENT)
Dept: INTERNAL MEDICINE | Facility: CLINIC | Age: 81
End: 2021-01-14

## 2021-01-14 RX ORDER — POTASSIUM CHLORIDE 1.5 G/1.58G
10 POWDER, FOR SOLUTION ORAL DAILY
Qty: 30 PACKET | Refills: 6 | Status: SHIPPED | OUTPATIENT
Start: 2021-01-14 | End: 2021-01-20 | Stop reason: SDUPTHER

## 2021-01-19 ENCOUNTER — PATIENT MESSAGE (OUTPATIENT)
Dept: INTERNAL MEDICINE | Facility: CLINIC | Age: 81
End: 2021-01-19

## 2021-01-20 ENCOUNTER — PATIENT MESSAGE (OUTPATIENT)
Dept: INTERNAL MEDICINE | Facility: CLINIC | Age: 81
End: 2021-01-20

## 2021-01-20 RX ORDER — POTASSIUM CHLORIDE 1.5 G/1.58G
10 POWDER, FOR SOLUTION ORAL DAILY
Qty: 30 PACKET | Refills: 6 | Status: SHIPPED | OUTPATIENT
Start: 2021-01-20 | End: 2021-01-20 | Stop reason: SDUPTHER

## 2021-01-24 ENCOUNTER — PATIENT MESSAGE (OUTPATIENT)
Dept: INTERNAL MEDICINE | Facility: CLINIC | Age: 81
End: 2021-01-24

## 2021-01-25 RX ORDER — POTASSIUM CHLORIDE 20 MEQ/15ML
SOLUTION ORAL
COMMUNITY
Start: 2020-12-11 | End: 2021-01-25 | Stop reason: SDUPTHER

## 2021-01-25 RX ORDER — POTASSIUM CHLORIDE 20 MEQ/15ML
20 SOLUTION ORAL DAILY
Qty: 473 ML | Refills: 6 | Status: SHIPPED | OUTPATIENT
Start: 2021-01-25 | End: 2022-02-28

## 2021-01-26 ENCOUNTER — PATIENT MESSAGE (OUTPATIENT)
Dept: INTERNAL MEDICINE | Facility: CLINIC | Age: 81
End: 2021-01-26

## 2021-01-26 RX ORDER — POTASSIUM CHLORIDE 750 MG/1
TABLET, EXTENDED RELEASE ORAL
Qty: 30 TABLET | Refills: 0 | OUTPATIENT
Start: 2021-01-26

## 2021-01-27 ENCOUNTER — PATIENT MESSAGE (OUTPATIENT)
Dept: INTERNAL MEDICINE | Facility: CLINIC | Age: 81
End: 2021-01-27

## 2021-02-09 ENCOUNTER — PES CALL (OUTPATIENT)
Dept: ADMINISTRATIVE | Facility: CLINIC | Age: 81
End: 2021-02-09

## 2021-02-11 ENCOUNTER — PATIENT MESSAGE (OUTPATIENT)
Dept: RHEUMATOLOGY | Facility: CLINIC | Age: 81
End: 2021-02-11

## 2021-02-16 ENCOUNTER — PATIENT MESSAGE (OUTPATIENT)
Dept: INTERNAL MEDICINE | Facility: CLINIC | Age: 81
End: 2021-02-16

## 2021-02-23 ENCOUNTER — PATIENT MESSAGE (OUTPATIENT)
Dept: RHEUMATOLOGY | Facility: CLINIC | Age: 81
End: 2021-02-23

## 2021-03-02 ENCOUNTER — INFUSION (OUTPATIENT)
Dept: INFECTIOUS DISEASES | Facility: HOSPITAL | Age: 81
End: 2021-03-02
Attending: INTERNAL MEDICINE
Payer: MEDICARE

## 2021-03-02 VITALS
HEIGHT: 61 IN | SYSTOLIC BLOOD PRESSURE: 173 MMHG | WEIGHT: 126.13 LBS | BODY MASS INDEX: 23.81 KG/M2 | DIASTOLIC BLOOD PRESSURE: 74 MMHG | HEART RATE: 70 BPM

## 2021-03-02 DIAGNOSIS — M81.0 SENILE OSTEOPOROSIS: Primary | ICD-10-CM

## 2021-03-02 PROCEDURE — 63600175 PHARM REV CODE 636 W HCPCS: Mod: JG | Performed by: NURSE PRACTITIONER

## 2021-03-02 PROCEDURE — 96372 THER/PROPH/DIAG INJ SC/IM: CPT

## 2021-03-02 RX ADMIN — DENOSUMAB 60 MG: 60 INJECTION SUBCUTANEOUS at 11:03

## 2021-04-03 ENCOUNTER — PATIENT MESSAGE (OUTPATIENT)
Dept: INTERNAL MEDICINE | Facility: CLINIC | Age: 81
End: 2021-04-03

## 2021-04-03 DIAGNOSIS — E11.3513 CONTROLLED TYPE 2 DIABETES MELLITUS WITH BOTH EYES AFFECTED BY PROLIFERATIVE RETINOPATHY AND MACULAR EDEMA, WITHOUT LONG-TERM CURRENT USE OF INSULIN: Primary | ICD-10-CM

## 2021-04-05 ENCOUNTER — OFFICE VISIT (OUTPATIENT)
Dept: RHEUMATOLOGY | Facility: CLINIC | Age: 81
End: 2021-04-05
Payer: MEDICARE

## 2021-04-05 VITALS
WEIGHT: 128.31 LBS | HEART RATE: 76 BPM | BODY MASS INDEX: 23.61 KG/M2 | HEIGHT: 62 IN | SYSTOLIC BLOOD PRESSURE: 176 MMHG | DIASTOLIC BLOOD PRESSURE: 72 MMHG

## 2021-04-05 DIAGNOSIS — Z79.899 HIGH RISK MEDICATION USE: Chronic | ICD-10-CM

## 2021-04-05 DIAGNOSIS — M81.0 SENILE OSTEOPOROSIS: ICD-10-CM

## 2021-04-05 DIAGNOSIS — Z90.89 MYASTHENIA GRAVIS STATUS POST THYMECTOMY: ICD-10-CM

## 2021-04-05 DIAGNOSIS — G70.00 MYASTHENIA GRAVIS STATUS POST THYMECTOMY: ICD-10-CM

## 2021-04-05 DIAGNOSIS — M05.79 RHEUMATOID ARTHRITIS INVOLVING MULTIPLE SITES WITH POSITIVE RHEUMATOID FACTOR: Primary | ICD-10-CM

## 2021-04-05 PROCEDURE — 99999 PR PBB SHADOW E&M-EST. PATIENT-LVL III: CPT | Mod: PBBFAC,,, | Performed by: INTERNAL MEDICINE

## 2021-04-05 PROCEDURE — 99999 PR PBB SHADOW E&M-EST. PATIENT-LVL III: ICD-10-PCS | Mod: PBBFAC,,, | Performed by: INTERNAL MEDICINE

## 2021-04-05 PROCEDURE — 99213 OFFICE O/P EST LOW 20 MIN: CPT | Mod: PBBFAC | Performed by: INTERNAL MEDICINE

## 2021-04-05 PROCEDURE — 99214 OFFICE O/P EST MOD 30 MIN: CPT | Mod: S$PBB,,, | Performed by: INTERNAL MEDICINE

## 2021-04-05 PROCEDURE — 99214 PR OFFICE/OUTPT VISIT, EST, LEVL IV, 30-39 MIN: ICD-10-PCS | Mod: S$PBB,,, | Performed by: INTERNAL MEDICINE

## 2021-04-05 RX ORDER — METHOTREXATE 2.5 MG/1
7.5 TABLET ORAL
Qty: 15 TABLET | Refills: 2 | Status: SHIPPED | OUTPATIENT
Start: 2021-04-05 | End: 2021-12-03 | Stop reason: SDUPTHER

## 2021-04-06 RX ORDER — LANCETS 33 GAUGE
EACH MISCELLANEOUS
Qty: 200 EACH | Refills: 3 | Status: SHIPPED | OUTPATIENT
Start: 2021-04-06

## 2021-08-03 ENCOUNTER — PATIENT MESSAGE (OUTPATIENT)
Dept: ADMINISTRATIVE | Facility: HOSPITAL | Age: 81
End: 2021-08-03

## 2021-09-07 ENCOUNTER — INFUSION (OUTPATIENT)
Dept: INFECTIOUS DISEASES | Facility: HOSPITAL | Age: 81
End: 2021-09-07
Attending: NURSE PRACTITIONER
Payer: MEDICARE

## 2021-09-07 ENCOUNTER — TELEPHONE (OUTPATIENT)
Dept: INFECTIOUS DISEASES | Facility: HOSPITAL | Age: 81
End: 2021-09-07

## 2021-09-07 VITALS
TEMPERATURE: 98 F | SYSTOLIC BLOOD PRESSURE: 174 MMHG | OXYGEN SATURATION: 97 % | WEIGHT: 123.81 LBS | BODY MASS INDEX: 22.78 KG/M2 | HEIGHT: 62 IN | DIASTOLIC BLOOD PRESSURE: 76 MMHG | HEART RATE: 77 BPM | RESPIRATION RATE: 16 BRPM

## 2021-09-07 DIAGNOSIS — M81.0 SENILE OSTEOPOROSIS: Primary | ICD-10-CM

## 2021-09-07 PROCEDURE — 63600175 PHARM REV CODE 636 W HCPCS: Mod: JG | Performed by: NURSE PRACTITIONER

## 2021-09-07 PROCEDURE — 96372 THER/PROPH/DIAG INJ SC/IM: CPT

## 2021-09-07 RX ADMIN — DENOSUMAB 60 MG: 60 INJECTION SUBCUTANEOUS at 12:09

## 2021-10-04 ENCOUNTER — PATIENT MESSAGE (OUTPATIENT)
Dept: ADMINISTRATIVE | Facility: HOSPITAL | Age: 81
End: 2021-10-04

## 2021-10-08 RX ORDER — METOPROLOL SUCCINATE 50 MG/1
TABLET, EXTENDED RELEASE ORAL
Qty: 90 TABLET | Refills: 2 | Status: SHIPPED | OUTPATIENT
Start: 2021-10-08 | End: 2022-06-29

## 2021-10-08 RX ORDER — AMLODIPINE BESYLATE 5 MG/1
TABLET ORAL
Qty: 90 TABLET | Refills: 2 | Status: SHIPPED | OUTPATIENT
Start: 2021-10-08 | End: 2022-06-29

## 2021-11-28 DIAGNOSIS — M05.79 RHEUMATOID ARTHRITIS INVOLVING MULTIPLE SITES WITH POSITIVE RHEUMATOID FACTOR: ICD-10-CM

## 2021-11-28 DIAGNOSIS — Z90.89 MYASTHENIA GRAVIS STATUS POST THYMECTOMY: ICD-10-CM

## 2021-11-28 DIAGNOSIS — G70.00 MYASTHENIA GRAVIS STATUS POST THYMECTOMY: ICD-10-CM

## 2021-11-29 RX ORDER — METHOTREXATE 2.5 MG/1
TABLET ORAL
Qty: 15 TABLET | Refills: 2 | OUTPATIENT
Start: 2021-11-29

## 2021-11-30 DIAGNOSIS — M05.79 RHEUMATOID ARTHRITIS INVOLVING MULTIPLE SITES WITH POSITIVE RHEUMATOID FACTOR: ICD-10-CM

## 2021-11-30 DIAGNOSIS — G70.00 MYASTHENIA GRAVIS STATUS POST THYMECTOMY: ICD-10-CM

## 2021-11-30 DIAGNOSIS — Z90.89 MYASTHENIA GRAVIS STATUS POST THYMECTOMY: ICD-10-CM

## 2021-11-30 RX ORDER — METHOTREXATE 2.5 MG/1
7.5 TABLET ORAL
Qty: 15 TABLET | Refills: 2 | OUTPATIENT
Start: 2021-11-30

## 2021-12-01 ENCOUNTER — PATIENT MESSAGE (OUTPATIENT)
Dept: RHEUMATOLOGY | Facility: CLINIC | Age: 81
End: 2021-12-01
Payer: MEDICARE

## 2021-12-02 ENCOUNTER — LAB VISIT (OUTPATIENT)
Dept: LAB | Facility: HOSPITAL | Age: 81
End: 2021-12-02
Attending: INTERNAL MEDICINE
Payer: MEDICARE

## 2021-12-02 DIAGNOSIS — Z79.899 HIGH RISK MEDICATION USE: Chronic | ICD-10-CM

## 2021-12-02 DIAGNOSIS — M05.79 RHEUMATOID ARTHRITIS INVOLVING MULTIPLE SITES WITH POSITIVE RHEUMATOID FACTOR: ICD-10-CM

## 2021-12-02 LAB
ALBUMIN SERPL BCP-MCNC: 3.8 G/DL (ref 3.5–5.2)
ALP SERPL-CCNC: 46 U/L (ref 55–135)
ALT SERPL W/O P-5'-P-CCNC: 13 U/L (ref 10–44)
ANION GAP SERPL CALC-SCNC: 9 MMOL/L (ref 8–16)
AST SERPL-CCNC: 18 U/L (ref 10–40)
BASOPHILS # BLD AUTO: 0.05 K/UL (ref 0–0.2)
BASOPHILS NFR BLD: 0.6 % (ref 0–1.9)
BILIRUB SERPL-MCNC: 0.7 MG/DL (ref 0.1–1)
BUN SERPL-MCNC: 12 MG/DL (ref 8–23)
CALCIUM SERPL-MCNC: 9.4 MG/DL (ref 8.7–10.5)
CHLORIDE SERPL-SCNC: 105 MMOL/L (ref 95–110)
CO2 SERPL-SCNC: 28 MMOL/L (ref 23–29)
CREAT SERPL-MCNC: 0.8 MG/DL (ref 0.5–1.4)
CRP SERPL-MCNC: 2.5 MG/L (ref 0–8.2)
DIFFERENTIAL METHOD: ABNORMAL
EOSINOPHIL # BLD AUTO: 0.3 K/UL (ref 0–0.5)
EOSINOPHIL NFR BLD: 3.8 % (ref 0–8)
ERYTHROCYTE [DISTWIDTH] IN BLOOD BY AUTOMATED COUNT: 14.5 % (ref 11.5–14.5)
ERYTHROCYTE [SEDIMENTATION RATE] IN BLOOD BY WESTERGREN METHOD: 50 MM/HR (ref 0–36)
EST. GFR  (AFRICAN AMERICAN): >60 ML/MIN/1.73 M^2
EST. GFR  (NON AFRICAN AMERICAN): >60 ML/MIN/1.73 M^2
GLUCOSE SERPL-MCNC: 97 MG/DL (ref 70–110)
HCT VFR BLD AUTO: 42.3 % (ref 37–48.5)
HGB BLD-MCNC: 12.4 G/DL (ref 12–16)
IMM GRANULOCYTES # BLD AUTO: 0.02 K/UL (ref 0–0.04)
IMM GRANULOCYTES NFR BLD AUTO: 0.2 % (ref 0–0.5)
LYMPHOCYTES # BLD AUTO: 2 K/UL (ref 1–4.8)
LYMPHOCYTES NFR BLD: 24.3 % (ref 18–48)
MCH RBC QN AUTO: 27.6 PG (ref 27–31)
MCHC RBC AUTO-ENTMCNC: 29.3 G/DL (ref 32–36)
MCV RBC AUTO: 94 FL (ref 82–98)
MONOCYTES # BLD AUTO: 0.5 K/UL (ref 0.3–1)
MONOCYTES NFR BLD: 6.3 % (ref 4–15)
NEUTROPHILS # BLD AUTO: 5.3 K/UL (ref 1.8–7.7)
NEUTROPHILS NFR BLD: 64.8 % (ref 38–73)
NRBC BLD-RTO: 0 /100 WBC
PLATELET # BLD AUTO: 184 K/UL (ref 150–450)
PMV BLD AUTO: 12.5 FL (ref 9.2–12.9)
POTASSIUM SERPL-SCNC: 3.5 MMOL/L (ref 3.5–5.1)
PROT SERPL-MCNC: 7.4 G/DL (ref 6–8.4)
RBC # BLD AUTO: 4.49 M/UL (ref 4–5.4)
SODIUM SERPL-SCNC: 142 MMOL/L (ref 136–145)
WBC # BLD AUTO: 8.22 K/UL (ref 3.9–12.7)

## 2021-12-02 PROCEDURE — 36415 COLL VENOUS BLD VENIPUNCTURE: CPT | Mod: PN | Performed by: INTERNAL MEDICINE

## 2021-12-02 PROCEDURE — 86140 C-REACTIVE PROTEIN: CPT | Performed by: INTERNAL MEDICINE

## 2021-12-02 PROCEDURE — 80053 COMPREHEN METABOLIC PANEL: CPT | Performed by: INTERNAL MEDICINE

## 2021-12-02 PROCEDURE — 85025 COMPLETE CBC W/AUTO DIFF WBC: CPT | Performed by: INTERNAL MEDICINE

## 2021-12-02 PROCEDURE — 85652 RBC SED RATE AUTOMATED: CPT | Performed by: INTERNAL MEDICINE

## 2021-12-03 ENCOUNTER — TELEPHONE (OUTPATIENT)
Dept: RHEUMATOLOGY | Facility: CLINIC | Age: 81
End: 2021-12-03
Payer: MEDICARE

## 2021-12-03 DIAGNOSIS — M05.79 RHEUMATOID ARTHRITIS INVOLVING MULTIPLE SITES WITH POSITIVE RHEUMATOID FACTOR: ICD-10-CM

## 2021-12-03 DIAGNOSIS — G70.00 MYASTHENIA GRAVIS STATUS POST THYMECTOMY: ICD-10-CM

## 2021-12-03 DIAGNOSIS — Z90.89 MYASTHENIA GRAVIS STATUS POST THYMECTOMY: ICD-10-CM

## 2021-12-03 RX ORDER — METHOTREXATE 2.5 MG/1
7.5 TABLET ORAL
Qty: 15 TABLET | Refills: 2 | Status: SHIPPED | OUTPATIENT
Start: 2021-12-03 | End: 2022-03-18

## 2022-01-26 ENCOUNTER — PATIENT MESSAGE (OUTPATIENT)
Dept: ADMINISTRATIVE | Facility: HOSPITAL | Age: 82
End: 2022-01-26
Payer: MEDICARE

## 2022-02-08 ENCOUNTER — PES CALL (OUTPATIENT)
Dept: ADMINISTRATIVE | Facility: CLINIC | Age: 82
End: 2022-02-08
Payer: MEDICARE

## 2022-02-23 DIAGNOSIS — D84.9 IMMUNOSUPPRESSED STATUS: ICD-10-CM

## 2022-02-26 NOTE — TELEPHONE ENCOUNTER
No new care gaps identified.  Powered by [employer]. Reference number: [id number].   2/26/2022 10:48:55 AM CST

## 2022-02-28 RX ORDER — POTASSIUM CHLORIDE 20 MEQ/15ML
SOLUTION ORAL
Qty: 473 ML | Refills: 6 | Status: SHIPPED | OUTPATIENT
Start: 2022-02-28

## 2022-03-07 ENCOUNTER — TELEPHONE (OUTPATIENT)
Dept: ENDOCRINOLOGY | Facility: CLINIC | Age: 82
End: 2022-03-07
Payer: MEDICARE

## 2022-03-07 NOTE — TELEPHONE ENCOUNTER
----- Message from Ashly Pritchard RN sent at 3/7/2022  1:14 PM CST -----  Hello,   Patient has upcoming appt with us for prolia injection.  Therapy plan orders have .  We need a new therapy plan entered to continue treatment.  If you have any questions, please give us a call at ext [phone]. Thank you.     --Ashly Pritchard RN

## 2022-03-08 NOTE — PROGRESS NOTES
Subjective:      Patient ID: Teresita Tolbert is a 81 y.o. female.    Chief Complaint:  Diabetes    History of Present Illness  Teresita Tolbert is here for follow up of osteoporosis and T2DM.  Previously seen by me 9/2020.    She had myasthenia diagnosed in 1979, underwent a thymectomy in 1980 and has been on chronic steroid administration since. She takes 10 mg prednisone every other day.     Patient arrives with her son.    With regards to osteoporosis:     BMD:   10/19/2020  The bone mineral density measured from L1 through L4 is 1.161g/cm2.  This corresponds to a T score of -0.3 and a Z score of 1.2.  Increase in bone mineral density compared to previous.  The bone mineral density within the left femoral neck measures 0.717 g/cm2.  This corresponds to a T score of -2.3 and a Z score of -0.8.  No significant interval change.  The bone mineral density within the right femoral neck measures 0.779 g/cm2.  This corresponds to a T score of -1.9 and a Z score of -0.4.  FRAX RESULTS:  10-year Probability of Fracture:  Major Osteoporotic Fracture 11.6%.  Hip Fracture 3.5%.  Impression:  Lumbar spine: Normal bone mineral density with increase in bone mineral density compared to previous.  Hips: Osteopenia with no significant interval change.    Medications:   Prolia  Started 2016  Last dose: 9/7/2021  Tolerates injections -denies side effects.    Calcium intake: None  Vit D intake: OTC Vit D3 1000iu daily    Weight bearing exercise: Denies  Falls: One fall in the last 12 months - lost her balance - denies injury  Fractures: Denies any in the last 12 months.   Significant height loss (>2 inches): Unsure.     Family history: Unsure.    Menopause: Unsure of age.    Tobacco Use: Denies  Alcohol Use: Denies  Glucocorticoid History: Yes - prednisone 10mg every other day  Anticoagulant Use: + Aspirin  GERD/PPI Use: + Prilosec  History of Malabsorption: Denies  Antiseizure Medications: Denies  History of Thyroid Disease:  Denies  Kidney Stones/ Kidney Disease: Denies  Personal history of kidney stones: Denies  Family history of kidney stones: Denies  Family history of bone disease or fracture: Denies    Denies point tenderness along spine  Denies bilateral hip tenderness  Gait - unsteady with cane    Lab Results   Component Value Date    CALCIUM 9.4 12/02/2021    PHOS 3.6 10/05/2006     Vit D, 25-Hydroxy   Date Value Ref Range Status   03/02/2021 77 30 - 96 ng/mL Final     Comment:     Vitamin D deficiency.........<10 ng/mL                              Vitamin D insufficiency......10-29 ng/mL       Vitamin D sufficiency........> or equal to 30 ng/mL  Vitamin D toxicity............>100 ng/mL       With regards to diabetes:    Follows with PCP.    Diagnosed: 1980s  Known complications:  DKA -  RN -  PN +  Nephropathy -  CAD -     Current regimen:  Janumet  one tablet twice daily    Other medications tried:  Insulin - hypoglycemia (stopped in 2018)    Glucose Monitor:   1 times a day testing  Log reviewed: oral recall  Denies glucose > 200     Hypoglycemia:  Denies  Knows how to correct with 15 grams of carbs- juice, coke, or a peppermint.     Diet/Exercise:  Eats 2-3 meals a day.   B: cream of wheat, OJ  L and D: meat, vegetable, mashed potato or pasta/ stew  Snacks : occasionally - candy, donuts  Drinks : water  Exercise: None.     Education - last visit: None  Eye Exam: 8/25/2020  Podiatry: None    Diabetes Management Status    Hemoglobin A1C   Date Value Ref Range Status   07/23/2020 7.0 (H) 4.0 - 5.6 % Final     Comment:     ADA Screening Guidelines:  5.7-6.4%  Consistent with prediabetes  >or=6.5%  Consistent with diabetes  High levels of fetal hemoglobin interfere with the HbA1C  assay. Heterozygous hemoglobin variants (HbS, HgC, etc)do  not significantly interfere with this assay.   However, presence of multiple variants may affect accuracy.     01/23/2019 7.9 (H) 4.0 - 5.6 % Final     Comment:     ADA Screening  Guidelines:  5.7-6.4%  Consistent with prediabetes  >or=6.5%  Consistent with diabetes  High levels of fetal hemoglobin interfere with the HbA1C  assay. Heterozygous hemoglobin variants (HbS, HgC, etc)do  not significantly interfere with this assay.   However, presence of multiple variants may affect accuracy.     01/30/2018 7.2 (H) 4.0 - 5.6 % Final     Comment:     According to ADA guidelines, hemoglobin A1c <7.0% represents  optimal control in non-pregnant diabetic patients. Different  metrics may apply to specific patient populations.   Standards of Medical Care in Diabetes-2016.  For the purpose of screening for the presence of diabetes:  <5.7%     Consistent with the absence of diabetes  5.7-6.4%  Consistent with increasing risk for diabetes   (prediabetes)  >or=6.5%  Consistent with diabetes  Currently, no consensus exists for use of hemoglobin A1c  for diagnosis of diabetes for children.  This Hemoglobin A1c assay has significant interference with fetal   hemoglobin   (HbF). The results are invalid for patients with abnormal amounts of   HbF,   including those with known Hereditary Persistence   of Fetal Hemoglobin. Heterozygous hemoglobin variants (HbAS, HbAC,   HbAD, HbAE, HbA2) do not significantly interfere with this assay;   however, presence of multiple variants in a sample may impact the %   interference.         Statin: Not taking  ACE/ARB: Not taking  Screening or Prevention Patient's value Goal Complete/Controlled?   HgA1C Testing and Control   Lab Results   Component Value Date    HGBA1C 7.0 (H) 07/23/2020      Annually/Less than 8% Yes   Lipid profile : 07/23/2020 Annually Yes   LDL control Lab Results   Component Value Date    LDLCALC 76.0 07/23/2020    Annually/Less than 100 mg/dl  Yes   Nephropathy screening Lab Results   Component Value Date    LABMICR 13.0 05/31/2017     Lab Results   Component Value Date    PROTEINUA Negative 04/01/2011    Annually No   Blood pressure BP Readings from Last  "1 Encounters:   03/10/22 139/70    Less than 140/90 No   Dilated retinal exam : 08/25/2020 Annually Yes   Foot exam   Most Recent Foot Exam Date: Not Found Annually No     Review of Systems   Constitutional: Negative for fatigue.   Eyes: Negative for visual disturbance.   Respiratory: Negative for shortness of breath.    Cardiovascular: Negative for chest pain.   Gastrointestinal: Negative for abdominal pain.   Musculoskeletal: Negative for arthralgias.   Skin: Negative for wound.   Neurological: Negative for headaches.     Objective:   Physical Exam  Vitals reviewed.   Cardiovascular:      Rate and Rhythm: Normal rate.      Comments: No edema present  Pulmonary:      Effort: Pulmonary effort is normal.   Abdominal:      Palpations: Abdomen is soft.       Visit Vitals  /70   Pulse 90   Ht 5' 2" (1.575 m)   Wt 54.9 kg (120 lb 14.8 oz)   SpO2 98%   BMI 22.12 kg/m²       Body mass index is 22.12 kg/m².    Lab Review:   Lab Results   Component Value Date    HGBA1C 7.0 (H) 07/23/2020    HGBA1C 7.9 (H) 01/23/2019    HGBA1C 7.2 (H) 01/30/2018       Lab Results   Component Value Date    CHOL 146 07/23/2020    HDL 43 07/23/2020    LDLCALC 76.0 07/23/2020    TRIG 135 07/23/2020    CHOLHDL 29.5 07/23/2020     Lab Results   Component Value Date     12/02/2021    K 3.5 12/02/2021     12/02/2021    CO2 28 12/02/2021    GLU 97 12/02/2021    BUN 12 12/02/2021    CREATININE 0.8 12/02/2021    CALCIUM 9.4 12/02/2021    PROT 7.4 12/02/2021    ALBUMIN 3.8 12/02/2021    BILITOT 0.7 12/02/2021    ALKPHOS 46 (L) 12/02/2021    AST 18 12/02/2021    ALT 13 12/02/2021    ANIONGAP 9 12/02/2021    ESTGFRAFRICA >60.0 12/02/2021    EGFRNONAA >60.0 12/02/2021    TSH 1.717 03/24/2017     Vit D, 25-Hydroxy   Date Value Ref Range Status   03/02/2021 77 30 - 96 ng/mL Final     Comment:     Vitamin D deficiency.........<10 ng/mL                              Vitamin D insufficiency......10-29 ng/mL       Vitamin D sufficiency........> " or equal to 30 ng/mL  Vitamin D toxicity............>100 ng/mL       Assessment and Plan     1. Senile osteoporosis  Vitamin D   2. Uncontrolled type 2 diabetes mellitus with hyperglycemia       Senile osteoporosis  -- Risks include menopause, glucocorticoid use, PPI therapy, chronic malabsorption.  -- Reviewed basics of quantity versus quality.  -- Reassuring they are not fracturing.  -- Recommend:  -Pharmacological therapy is recommended for patients with osteopenia if the 10 year probability of a hip fracture is >3% and 10 year probability of other major osteoporotic fractures is >20%.  Treatment options and potential side effects discussed for PO bisphosphonates, reclast, Denosumab, and Teriparatide.   -Treatment:   Prolia  Started 2016  Last dose: 9/7/2021  Therapy plan reordered.    -Calcium and Vitamin D RDD provided.  -Fall precautions made in the home.  -Alerted that if dental work needs to be done it should be done prior to initiating therapy. Dental health: UTD.  -- Repeat DEXA scan 10/2022.    Diabetes mellitus out of control  -- Continue following with PCP.  -- A1c goal 7.5-8%.  -- Medications discussed:  MFM   GLP1-DPP4  -- Reviewed logs/CGM:  Reported glucose unremarkable.  A1c at goal given age and co morbidities.  -- Medication Changes:   CONTINUE:  Janumet  one tablet BID    Follow up in about 1 year (around 3/10/2023).

## 2022-03-10 ENCOUNTER — OFFICE VISIT (OUTPATIENT)
Dept: ENDOCRINOLOGY | Facility: CLINIC | Age: 82
End: 2022-03-10
Payer: MEDICARE

## 2022-03-10 VITALS
BODY MASS INDEX: 22.26 KG/M2 | SYSTOLIC BLOOD PRESSURE: 139 MMHG | HEIGHT: 62 IN | OXYGEN SATURATION: 98 % | WEIGHT: 120.94 LBS | HEART RATE: 90 BPM | DIASTOLIC BLOOD PRESSURE: 70 MMHG

## 2022-03-10 DIAGNOSIS — M81.0 SENILE OSTEOPOROSIS: Primary | ICD-10-CM

## 2022-03-10 DIAGNOSIS — E11.65 UNCONTROLLED TYPE 2 DIABETES MELLITUS WITH HYPERGLYCEMIA: ICD-10-CM

## 2022-03-10 PROCEDURE — 99999 PR PBB SHADOW E&M-EST. PATIENT-LVL IV: ICD-10-PCS | Mod: PBBFAC,,, | Performed by: NURSE PRACTITIONER

## 2022-03-10 PROCEDURE — 99999 PR PBB SHADOW E&M-EST. PATIENT-LVL IV: CPT | Mod: PBBFAC,,, | Performed by: NURSE PRACTITIONER

## 2022-03-10 PROCEDURE — 99214 OFFICE O/P EST MOD 30 MIN: CPT | Mod: S$PBB,,, | Performed by: NURSE PRACTITIONER

## 2022-03-10 PROCEDURE — 99214 OFFICE O/P EST MOD 30 MIN: CPT | Mod: PBBFAC | Performed by: NURSE PRACTITIONER

## 2022-03-10 PROCEDURE — 99214 PR OFFICE/OUTPT VISIT, EST, LEVL IV, 30-39 MIN: ICD-10-PCS | Mod: S$PBB,,, | Performed by: NURSE PRACTITIONER

## 2022-03-10 NOTE — ASSESSMENT & PLAN NOTE
-- Risks include menopause, glucocorticoid use, PPI therapy, chronic malabsorption.  -- Reviewed basics of quantity versus quality.  -- Reassuring they are not fracturing.  -- Recommend:  -Pharmacological therapy is recommended for patients with osteopenia if the 10 year probability of a hip fracture is >3% and 10 year probability of other major osteoporotic fractures is >20%.  Treatment options and potential side effects discussed for PO bisphosphonates, reclast, Denosumab, and Teriparatide.   -Treatment:   Prolia  Started 2016  Last dose: 9/7/2021  Therapy plan reordered.    -Calcium and Vitamin D RDD provided.  -Fall precautions made in the home.  -Alerted that if dental work needs to be done it should be done prior to initiating therapy. Dental health: UTD.  -- Repeat DEXA scan 10/2022.

## 2022-03-10 NOTE — ASSESSMENT & PLAN NOTE
-- Continue following with PCP.  -- A1c goal 7.5-8%.  -- Medications discussed:  MFM   GLP1-DPP4  -- Reviewed logs/CGM:  Reported glucose unremarkable.  A1c at goal given age and co morbidities.  -- Medication Changes:   CONTINUE:  Janumet  one tablet BID

## 2022-03-15 ENCOUNTER — INFUSION (OUTPATIENT)
Dept: INFECTIOUS DISEASES | Facility: HOSPITAL | Age: 82
End: 2022-03-15
Attending: INTERNAL MEDICINE
Payer: MEDICARE

## 2022-03-15 VITALS
SYSTOLIC BLOOD PRESSURE: 199 MMHG | WEIGHT: 119.5 LBS | RESPIRATION RATE: 18 BRPM | BODY MASS INDEX: 21.85 KG/M2 | DIASTOLIC BLOOD PRESSURE: 74 MMHG | TEMPERATURE: 99 F | HEART RATE: 70 BPM

## 2022-03-15 NOTE — PROGRESS NOTES
PIV x 2 initiated.  Intubation equipment to BS.  RT to BS.  Succyinylcholine 120mg IV given at 1805.  Rocuronium 100mg IV given at 1805.   Patient arrived for Prolia 60 mg injection.  Upon arrival, patient BP elevated 192/79 & 199/74.  Patient stated she did not take her BP medication prior to her appointment. Pt. Denies HA, dizziness, blurred vision, chest tightness, SOB or any other discomfort. Instructed patient to take her BP medicine as soon as she gets home and prior to her next injection, verbalized understanding.  Rescheduled appointment for 3/17/22. Patient left unit with son in NAD.

## 2022-03-16 ENCOUNTER — PATIENT MESSAGE (OUTPATIENT)
Dept: ADMINISTRATIVE | Facility: HOSPITAL | Age: 82
End: 2022-03-16
Payer: MEDICARE

## 2022-03-17 ENCOUNTER — INFUSION (OUTPATIENT)
Dept: INFECTIOUS DISEASES | Facility: HOSPITAL | Age: 82
End: 2022-03-17
Attending: INTERNAL MEDICINE
Payer: MEDICARE

## 2022-03-17 VITALS
HEART RATE: 81 BPM | WEIGHT: 119.81 LBS | SYSTOLIC BLOOD PRESSURE: 152 MMHG | BODY MASS INDEX: 22.05 KG/M2 | DIASTOLIC BLOOD PRESSURE: 67 MMHG | TEMPERATURE: 98 F | RESPIRATION RATE: 20 BRPM | OXYGEN SATURATION: 97 % | HEIGHT: 62 IN

## 2022-03-17 DIAGNOSIS — M81.0 SENILE OSTEOPOROSIS: Primary | ICD-10-CM

## 2022-03-17 PROCEDURE — 63600175 PHARM REV CODE 636 W HCPCS: Mod: JG | Performed by: NURSE PRACTITIONER

## 2022-03-17 PROCEDURE — 96372 THER/PROPH/DIAG INJ SC/IM: CPT

## 2022-03-17 RX ADMIN — DENOSUMAB 60 MG: 60 INJECTION SUBCUTANEOUS at 01:03

## 2022-03-17 NOTE — PROGRESS NOTES
Patient received Prolia 60 mg injection sub Q in the left arm.  Tolerated well and left in NAD.    Patient is taking Vit D

## 2022-05-03 DIAGNOSIS — E11.3513 CONTROLLED TYPE 2 DIABETES MELLITUS WITH BOTH EYES AFFECTED BY PROLIFERATIVE RETINOPATHY AND MACULAR EDEMA, WITHOUT LONG-TERM CURRENT USE OF INSULIN: ICD-10-CM

## 2022-05-03 NOTE — TELEPHONE ENCOUNTER
Care Due:                  Date            Visit Type   Department     Provider  --------------------------------------------------------------------------------                                EP -                              PRIMARY      [facility] INTERNAL  Last Visit: 07-      CARE ([facility])   MEDICINE       Meet Barrera  Next Visit: None Scheduled  None         None Found                                                            Last  Test          Frequency    Reason                     Performed    Due Date  --------------------------------------------------------------------------------    Office Visit  12 months..  ISIDRO..................  07- 07-    HBA1C.......  6 months...  JANUMET..................  07- 01-    Powered by [employer]. Reference number: [id number].   5/03/2022 4:58:25 AM CDT

## 2022-05-03 NOTE — TELEPHONE ENCOUNTER
Refill Routing Note   Medication(s) are not appropriate for processing by [facility] for the following reason(s):      - Patient has not been seen in over 15 months by PCP    [facility] action(s):  Defer Medication-related problems identified:   Requires labs  Requires appointment        Medication reconciliation completed: No     Appointments  past 12m or future 3m with PCP    Date Provider   Last Visit   7/23/2020 Meet Barrera MD   Next Visit   Visit date not found Meet Barrera MD   ED visits in past 90 days: 0        Note composed:5:29 PM 05/03/2022

## 2022-06-21 ENCOUNTER — LAB VISIT (OUTPATIENT)
Dept: LAB | Facility: HOSPITAL | Age: 82
End: 2022-06-21
Attending: INTERNAL MEDICINE
Payer: MEDICARE

## 2022-06-21 DIAGNOSIS — Z79.899 HIGH RISK MEDICATION USE: Chronic | ICD-10-CM

## 2022-06-21 DIAGNOSIS — M05.79 RHEUMATOID ARTHRITIS INVOLVING MULTIPLE SITES WITH POSITIVE RHEUMATOID FACTOR: ICD-10-CM

## 2022-06-21 LAB
ALBUMIN SERPL BCP-MCNC: 3.7 G/DL (ref 3.5–5.2)
ALP SERPL-CCNC: 38 U/L (ref 55–135)
ALT SERPL W/O P-5'-P-CCNC: 12 U/L (ref 10–44)
ANION GAP SERPL CALC-SCNC: 11 MMOL/L (ref 8–16)
AST SERPL-CCNC: 15 U/L (ref 10–40)
BASOPHILS # BLD AUTO: 0.05 K/UL (ref 0–0.2)
BASOPHILS NFR BLD: 0.8 % (ref 0–1.9)
BILIRUB SERPL-MCNC: 0.9 MG/DL (ref 0.1–1)
BUN SERPL-MCNC: 9 MG/DL (ref 8–23)
CALCIUM SERPL-MCNC: 9.4 MG/DL (ref 8.7–10.5)
CHLORIDE SERPL-SCNC: 104 MMOL/L (ref 95–110)
CO2 SERPL-SCNC: 30 MMOL/L (ref 23–29)
CREAT SERPL-MCNC: 1 MG/DL (ref 0.5–1.4)
CRP SERPL-MCNC: 1.2 MG/L (ref 0–8.2)
DIFFERENTIAL METHOD: ABNORMAL
EOSINOPHIL # BLD AUTO: 0.4 K/UL (ref 0–0.5)
EOSINOPHIL NFR BLD: 5.5 % (ref 0–8)
ERYTHROCYTE [DISTWIDTH] IN BLOOD BY AUTOMATED COUNT: 14.1 % (ref 11.5–14.5)
ERYTHROCYTE [SEDIMENTATION RATE] IN BLOOD BY WESTERGREN METHOD: 13 MM/HR (ref 0–36)
EST. GFR  (AFRICAN AMERICAN): >60 ML/MIN/1.73 M^2
EST. GFR  (NON AFRICAN AMERICAN): 52.6 ML/MIN/1.73 M^2
GLUCOSE SERPL-MCNC: 165 MG/DL (ref 70–110)
HCT VFR BLD AUTO: 43.9 % (ref 37–48.5)
HGB BLD-MCNC: 12.7 G/DL (ref 12–16)
IMM GRANULOCYTES # BLD AUTO: 0.03 K/UL (ref 0–0.04)
IMM GRANULOCYTES NFR BLD AUTO: 0.5 % (ref 0–0.5)
LYMPHOCYTES # BLD AUTO: 1.8 K/UL (ref 1–4.8)
LYMPHOCYTES NFR BLD: 28.8 % (ref 18–48)
MCH RBC QN AUTO: 28.1 PG (ref 27–31)
MCHC RBC AUTO-ENTMCNC: 28.9 G/DL (ref 32–36)
MCV RBC AUTO: 97 FL (ref 82–98)
MONOCYTES # BLD AUTO: 0.4 K/UL (ref 0.3–1)
MONOCYTES NFR BLD: 6.3 % (ref 4–15)
NEUTROPHILS # BLD AUTO: 3.7 K/UL (ref 1.8–7.7)
NEUTROPHILS NFR BLD: 58.1 % (ref 38–73)
NRBC BLD-RTO: 0 /100 WBC
PLATELET # BLD AUTO: 243 K/UL (ref 150–450)
PMV BLD AUTO: 12.1 FL (ref 9.2–12.9)
POTASSIUM SERPL-SCNC: 4.3 MMOL/L (ref 3.5–5.1)
PROT SERPL-MCNC: 6.9 G/DL (ref 6–8.4)
RBC # BLD AUTO: 4.52 M/UL (ref 4–5.4)
SODIUM SERPL-SCNC: 145 MMOL/L (ref 136–145)
WBC # BLD AUTO: 6.39 K/UL (ref 3.9–12.7)

## 2022-06-21 PROCEDURE — 80053 COMPREHEN METABOLIC PANEL: CPT | Performed by: INTERNAL MEDICINE

## 2022-06-21 PROCEDURE — 85652 RBC SED RATE AUTOMATED: CPT | Performed by: INTERNAL MEDICINE

## 2022-06-21 PROCEDURE — 36415 COLL VENOUS BLD VENIPUNCTURE: CPT | Mod: PN | Performed by: INTERNAL MEDICINE

## 2022-06-21 PROCEDURE — 86140 C-REACTIVE PROTEIN: CPT | Performed by: INTERNAL MEDICINE

## 2022-06-21 PROCEDURE — 85025 COMPLETE CBC W/AUTO DIFF WBC: CPT | Performed by: INTERNAL MEDICINE

## 2022-07-28 DIAGNOSIS — E11.3513 CONTROLLED TYPE 2 DIABETES MELLITUS WITH BOTH EYES AFFECTED BY PROLIFERATIVE RETINOPATHY AND MACULAR EDEMA, WITHOUT LONG-TERM CURRENT USE OF INSULIN: ICD-10-CM

## 2022-07-28 NOTE — TELEPHONE ENCOUNTER
Care Due:                  Date            Visit Type   Department     Provider  --------------------------------------------------------------------------------    Last Visit: None Found      None         None Found  Next Visit: None Scheduled  None         None Found                                                            Last  Test          Frequency    Reason                     Performed    Due Date  --------------------------------------------------------------------------------    Office Visit  12 months..  ISIDRO..................  Not Found    Overdue    HBA1C.......  6 months...  JANUMET..................  Not Found    Overdue    [employer] Embedded Care Gaps. Reference number: [id number]. 7/28/2022   1:04:27 PM CDT

## 2022-08-02 ENCOUNTER — PATIENT MESSAGE (OUTPATIENT)
Dept: INTERNAL MEDICINE | Facility: CLINIC | Age: 82
End: 2022-08-02
Payer: MEDICARE

## 2022-08-25 ENCOUNTER — TELEPHONE (OUTPATIENT)
Dept: INTERNAL MEDICINE | Facility: CLINIC | Age: 82
End: 2022-08-25

## 2022-08-25 ENCOUNTER — IMMUNIZATION (OUTPATIENT)
Dept: INTERNAL MEDICINE | Facility: CLINIC | Age: 82
End: 2022-08-25
Payer: MEDICARE

## 2022-08-25 ENCOUNTER — OFFICE VISIT (OUTPATIENT)
Dept: INTERNAL MEDICINE | Facility: CLINIC | Age: 82
End: 2022-08-25
Payer: MEDICARE

## 2022-08-25 ENCOUNTER — LAB VISIT (OUTPATIENT)
Dept: LAB | Facility: HOSPITAL | Age: 82
End: 2022-08-25
Attending: INTERNAL MEDICINE
Payer: MEDICARE

## 2022-08-25 ENCOUNTER — PATIENT MESSAGE (OUTPATIENT)
Dept: INTERNAL MEDICINE | Facility: CLINIC | Age: 82
End: 2022-08-25

## 2022-08-25 VITALS
HEIGHT: 62 IN | SYSTOLIC BLOOD PRESSURE: 114 MMHG | OXYGEN SATURATION: 94 % | BODY MASS INDEX: 20.56 KG/M2 | DIASTOLIC BLOOD PRESSURE: 51 MMHG | WEIGHT: 111.75 LBS | HEART RATE: 68 BPM

## 2022-08-25 DIAGNOSIS — R07.81 RIB PAIN ON RIGHT SIDE: ICD-10-CM

## 2022-08-25 DIAGNOSIS — Z90.89 MYASTHENIA GRAVIS STATUS POST THYMECTOMY: ICD-10-CM

## 2022-08-25 DIAGNOSIS — I10 PRIMARY HYPERTENSION: ICD-10-CM

## 2022-08-25 DIAGNOSIS — G70.00 MYASTHENIA GRAVIS STATUS POST THYMECTOMY: ICD-10-CM

## 2022-08-25 DIAGNOSIS — Z23 NEED FOR VACCINATION: Primary | ICD-10-CM

## 2022-08-25 DIAGNOSIS — M05.79 RHEUMATOID ARTHRITIS INVOLVING MULTIPLE SITES WITH POSITIVE RHEUMATOID FACTOR: ICD-10-CM

## 2022-08-25 DIAGNOSIS — E11.3513 CONTROLLED TYPE 2 DIABETES MELLITUS WITH BOTH EYES AFFECTED BY PROLIFERATIVE RETINOPATHY AND MACULAR EDEMA, WITHOUT LONG-TERM CURRENT USE OF INSULIN: ICD-10-CM

## 2022-08-25 DIAGNOSIS — E11.3513 CONTROLLED TYPE 2 DIABETES MELLITUS WITH BOTH EYES AFFECTED BY PROLIFERATIVE RETINOPATHY AND MACULAR EDEMA, WITHOUT LONG-TERM CURRENT USE OF INSULIN: Primary | ICD-10-CM

## 2022-08-25 LAB
CHOLEST SERPL-MCNC: 166 MG/DL (ref 120–199)
CHOLEST/HDLC SERPL: 3.2 {RATIO} (ref 2–5)
ESTIMATED AVG GLUCOSE: 131 MG/DL (ref 68–131)
HBA1C MFR BLD: 6.2 % (ref 4–5.6)
HDLC SERPL-MCNC: 52 MG/DL (ref 40–75)
HDLC SERPL: 31.3 % (ref 20–50)
LDLC SERPL CALC-MCNC: 92.4 MG/DL (ref 63–159)
NONHDLC SERPL-MCNC: 114 MG/DL
TRIGL SERPL-MCNC: 108 MG/DL (ref 30–150)

## 2022-08-25 PROCEDURE — 99999 PR PBB SHADOW E&M-EST. PATIENT-LVL IV: ICD-10-PCS | Mod: PBBFAC,,, | Performed by: INTERNAL MEDICINE

## 2022-08-25 PROCEDURE — 99214 OFFICE O/P EST MOD 30 MIN: CPT | Mod: PBBFAC,25 | Performed by: INTERNAL MEDICINE

## 2022-08-25 PROCEDURE — 99214 OFFICE O/P EST MOD 30 MIN: CPT | Mod: S$PBB,,, | Performed by: INTERNAL MEDICINE

## 2022-08-25 PROCEDURE — 99214 PR OFFICE/OUTPT VISIT, EST, LEVL IV, 30-39 MIN: ICD-10-PCS | Mod: S$PBB,,, | Performed by: INTERNAL MEDICINE

## 2022-08-25 PROCEDURE — 99999 PR PBB SHADOW E&M-EST. PATIENT-LVL IV: CPT | Mod: PBBFAC,,, | Performed by: INTERNAL MEDICINE

## 2022-08-25 PROCEDURE — 91305 COVID-19, MRNA, LNP-S, PF, 30 MCG/0.3 ML DOSE VACCINE (PFIZER): CPT | Mod: PBBFAC

## 2022-08-25 PROCEDURE — 36415 COLL VENOUS BLD VENIPUNCTURE: CPT | Performed by: INTERNAL MEDICINE

## 2022-08-25 PROCEDURE — 83036 HEMOGLOBIN GLYCOSYLATED A1C: CPT | Performed by: INTERNAL MEDICINE

## 2022-08-25 PROCEDURE — 80061 LIPID PANEL: CPT | Performed by: INTERNAL MEDICINE

## 2022-08-25 RX ORDER — METHOTREXATE 2.5 MG/1
TABLET ORAL
Qty: 15 TABLET | Refills: 5 | Status: SHIPPED | OUTPATIENT
Start: 2022-08-25

## 2022-08-25 NOTE — PROGRESS NOTES
Subjective:       Patient ID: Teresita Tolbert is a 81 y.o. female.    Chief Complaint: Annual Exam    81-year-old female well known to me, attended by her son comes in for follow-up.  I have not seen her since 2020, she says in part due to the pandemic.  She has seen a few other specialist in that time.  He has myasthenia gravis, hypertension, diabetes.  She has osteoporosis and receives prescription medication infusion/injection every 6 months.    She is due for some updated prescriptions and labs.  She needs her methotrexate filled and does not have a rheumatology appointment coming up soon.  I will assist but encouraged her to arrange an appointment for her routine.    Vitals are stable.  Appetite stable although she is slowly losing a little weight.  She has been having right lateral rib pain for 2-3 weeks.  No trauma.  She feels it mostly in better getting out of bed.  A pillow support does help.  No cough or sputum.    Review of Systems   Constitutional: Negative for appetite change, chills and fever.   HENT: Negative for nosebleeds and sore throat.         Chronic Tongue hypertrophy   Eyes: Negative for pain and visual disturbance.   Respiratory: Negative for cough, shortness of breath and wheezing.    Cardiovascular: Negative for chest pain and leg swelling.   Gastrointestinal: Negative for abdominal pain, constipation and diarrhea.   Endocrine: Negative for polyuria.   Genitourinary: Negative for difficulty urinating, hematuria and vaginal bleeding.   Musculoskeletal: Positive for arthralgias and gait problem. Negative for back pain and neck pain.        Right posterolateral rib pain primarily in the morning   Integumentary:  Negative for pallor and rash.   Neurological: Negative for tremors, seizures and headaches.   Hematological: Does not bruise/bleed easily.   Psychiatric/Behavioral: Negative for dysphoric mood. The patient is not nervous/anxious.            Past Medical History:   Diagnosis Date     Anemia     Arthritis of hand 2013    Cataract     Colon polyps     Diabetes mellitus     Diabetic retinopathy     Encounter for blood transfusion     GERD (gastroesophageal reflux disease)     HTN (hypertension) 2013    Hypertension     Myasthenia gravis     Osteoporosis     Type II or unspecified type diabetes mellitus without mention of complication, uncontrolled 2013     Past Surgical History:   Procedure Laterality Date    CATARACT EXTRACTION       SECTION  1964    COLONOSCOPY N/A 2017    Procedure: COLONOSCOPY;  Surgeon: Greg Salinas MD;  [facility]  Service: Endoscopy;  Laterality: N/A;  hx of Myasthenia Gravis    COLONOSCOPY N/A 2017    Procedure: COLONOSCOPY/EMR;  Surgeon: Addy Hale MD;  [facility]  Service: Endoscopy;  Laterality: N/A;  EMR or large ascending colon polyp    COLONOSCOPY N/A 2017    Procedure: COLONOSCOPY;  Surgeon: Addy Hale MD;  [facility]  Service: Endoscopy;  Laterality: N/A;    ESOPHAGOGASTRODUODENOSCOPY      HYSTERECTOMY      s/p avastin   12    right eye    s/p avastin ou  12    s/p focal laser  12    left eye    thalmas gland removed        Patient Active Problem List   Diagnosis    Epiretinal membrane    Posterior vitreous detachment of both eyes    Myasthenia gravis status post thymectomy    High risk medication use    HTN (hypertension)    Diabetes mellitus out of control    Rheumatoid arthritis involving multiple sites with positive rheumatoid factor    PN (peripheral neuropathy)    Senile osteoporosis    Anemia of chronic illness    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, without long-term current use of insulin    PAOLA (iron deficiency anemia)    Colon polyp    Current chronic use of systemic steroids        Objective:      Physical  Exam  Constitutional:       General: She is not in acute distress.     Appearance: She is well-developed.   HENT:      Head: Normocephalic and atraumatic.      Right Ear: Tympanic membrane, ear canal and external ear normal.      Left Ear: Tympanic membrane, ear canal and external ear normal.      Mouth/Throat:      Pharynx: No oropharyngeal exudate or posterior oropharyngeal erythema.   Eyes:      General: No scleral icterus.     Conjunctiva/sclera: Conjunctivae normal.      Pupils: Pupils are equal, round, and reactive to light.   Neck:      Thyroid: No thyromegaly.   Cardiovascular:      Rate and Rhythm: Normal rate and regular rhythm.      Pulses: Normal pulses.      Heart sounds: No murmur heard.  Pulmonary:      Effort: Pulmonary effort is normal.      Breath sounds: Normal breath sounds. No wheezing.          Comments: No obvious deformities but there is some mild tenderness upon palpation.  Lungs otherwise sound clear in this region  Chest:      Chest wall: Tenderness present.   Abdominal:      General: Bowel sounds are normal. There is no distension.      Palpations: Abdomen is soft.      Tenderness: There is no abdominal tenderness.   Musculoskeletal:         General: No tenderness.      Cervical back: Normal range of motion and neck supple.      Right lower leg: No edema.      Left lower leg: No edema.   Lymphadenopathy:      Cervical: No cervical adenopathy.   Skin:     Coloration: Skin is not jaundiced.      Findings: No rash.   Neurological:      General: No focal deficit present.      Mental Status: She is alert and oriented to person, place, and time.   Psychiatric:         Mood and Affect: Mood normal.         Behavior: Behavior normal.         Assessment:       Problem List Items Addressed This Visit        Neuro    Myasthenia gravis status post thymectomy - Primary    Relevant Medications    methotrexate 2.5 MG Tab       Cardiac/Vascular    HTN (hypertension)       Immunology/Multi System     "Rheumatoid arthritis involving multiple sites with positive rheumatoid factor    Relevant Medications    methotrexate 2.5 MG Tab       Endocrine    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, without long-term current use of insulin    Relevant Medications    blood sugar diagnostic (ONETOUCH ULTRA TEST) Strp          Plan:         Teresita was seen today for annual exam.    Diagnoses and all orders for this visit:    Myasthenia gravis status post thymectomy  -     methotrexate 2.5 MG Tab; TAKE 3 TABLETS(7.5 MG) BY MOUTH EVERY 7 DAYS    Primary hypertension    Rheumatoid arthritis involving multiple sites with positive rheumatoid factor  -     methotrexate 2.5 MG Tab; TAKE 3 TABLETS(7.5 MG) BY MOUTH EVERY 7 DAYS    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, without long-term current use of insulin  -     blood sugar diagnostic (ONETOUCH ULTRA TEST) Strp; USE TO TEST BLOOD GLUCOSE TWICE DAILY AS DIRECTED             Review labs.  After some discussion the patient and her son decided not to x-ray the ribs as I offered but wait a few weeks and if still persisting get rib films then.  They asked and I confirmed that if a simple small rib crack not much more than conservative treatment would be done anyway.        Portions of this note may have been created with voice recognition software. Occasional "wrong-word" or "sound-a-like" substitutions may have occurred due to the inherent limitations of voice recognition software. Please, read the note carefully and recognize, using context, where substitutions have occurred.  "

## 2022-09-06 ENCOUNTER — PATIENT MESSAGE (OUTPATIENT)
Dept: INTERNAL MEDICINE | Facility: CLINIC | Age: 82
End: 2022-09-06
Payer: MEDICARE

## 2022-09-12 ENCOUNTER — PATIENT MESSAGE (OUTPATIENT)
Dept: INTERNAL MEDICINE | Facility: CLINIC | Age: 82
End: 2022-09-12
Payer: MEDICARE

## 2022-09-19 ENCOUNTER — INFUSION (OUTPATIENT)
Dept: INFECTIOUS DISEASES | Facility: HOSPITAL | Age: 82
End: 2022-09-19
Attending: INTERNAL MEDICINE
Payer: MEDICARE

## 2022-09-19 VITALS
DIASTOLIC BLOOD PRESSURE: 74 MMHG | OXYGEN SATURATION: 96 % | BODY MASS INDEX: 20.79 KG/M2 | WEIGHT: 113.63 LBS | TEMPERATURE: 98 F | RESPIRATION RATE: 18 BRPM | HEART RATE: 92 BPM | SYSTOLIC BLOOD PRESSURE: 178 MMHG

## 2022-09-19 DIAGNOSIS — M81.0 SENILE OSTEOPOROSIS: Primary | ICD-10-CM

## 2022-09-19 PROCEDURE — 96372 THER/PROPH/DIAG INJ SC/IM: CPT

## 2022-09-19 PROCEDURE — 63600175 PHARM REV CODE 636 W HCPCS: Mod: JG | Performed by: NURSE PRACTITIONER

## 2022-09-19 RX ADMIN — DENOSUMAB 60 MG: 60 INJECTION SUBCUTANEOUS at 01:09

## 2022-09-19 NOTE — PROGRESS NOTES
Patient received Prolia 60 mg injection sub Q in the left arm.  Denies dental surgery < 3 months, Tolerated well and left in NAD.    Patient is taking Vit D

## 2022-12-07 DIAGNOSIS — Z78.0 MENOPAUSE: ICD-10-CM

## 2023-02-17 ENCOUNTER — PATIENT MESSAGE (OUTPATIENT)
Dept: INTERNAL MEDICINE | Facility: CLINIC | Age: 83
End: 2023-02-17
Payer: MEDICARE

## 2023-02-17 DIAGNOSIS — G70.00 MYASTHENIA GRAVIS STATUS POST THYMECTOMY: ICD-10-CM

## 2023-02-17 DIAGNOSIS — Z90.89 MYASTHENIA GRAVIS STATUS POST THYMECTOMY: ICD-10-CM

## 2023-02-20 ENCOUNTER — PATIENT MESSAGE (OUTPATIENT)
Dept: INTERNAL MEDICINE | Facility: CLINIC | Age: 83
End: 2023-02-20
Payer: MEDICARE

## 2023-02-20 RX ORDER — PREDNISONE 5 MG/1
TABLET ORAL
Qty: 90 TABLET | Refills: 5 | Status: SHIPPED | OUTPATIENT
Start: 2023-02-20

## 2023-02-27 ENCOUNTER — OFFICE VISIT (OUTPATIENT)
Dept: INTERNAL MEDICINE | Facility: CLINIC | Age: 83
End: 2023-02-27
Payer: MEDICARE

## 2023-02-27 VITALS
HEART RATE: 87 BPM | OXYGEN SATURATION: 93 % | DIASTOLIC BLOOD PRESSURE: 88 MMHG | WEIGHT: 99.13 LBS | HEIGHT: 62 IN | BODY MASS INDEX: 18.24 KG/M2 | SYSTOLIC BLOOD PRESSURE: 124 MMHG

## 2023-02-27 DIAGNOSIS — G70.00 MYASTHENIA GRAVIS STATUS POST THYMECTOMY: Primary | ICD-10-CM

## 2023-02-27 DIAGNOSIS — E11.3513 CONTROLLED TYPE 2 DIABETES MELLITUS WITH BOTH EYES AFFECTED BY PROLIFERATIVE RETINOPATHY AND MACULAR EDEMA, WITHOUT LONG-TERM CURRENT USE OF INSULIN: ICD-10-CM

## 2023-02-27 DIAGNOSIS — M05.79 RHEUMATOID ARTHRITIS INVOLVING MULTIPLE SITES WITH POSITIVE RHEUMATOID FACTOR: ICD-10-CM

## 2023-02-27 DIAGNOSIS — I10 PRIMARY HYPERTENSION: ICD-10-CM

## 2023-02-27 DIAGNOSIS — R13.19 ESOPHAGEAL DYSPHAGIA: ICD-10-CM

## 2023-02-27 DIAGNOSIS — Z90.89 MYASTHENIA GRAVIS STATUS POST THYMECTOMY: Primary | ICD-10-CM

## 2023-02-27 DIAGNOSIS — R43.2 LOSS OF TASTE: ICD-10-CM

## 2023-02-27 PROCEDURE — 99999 PR PBB SHADOW E&M-EST. PATIENT-LVL V: CPT | Mod: PBBFAC,,, | Performed by: INTERNAL MEDICINE

## 2023-02-27 PROCEDURE — 99999 PR PBB SHADOW E&M-EST. PATIENT-LVL V: ICD-10-PCS | Mod: PBBFAC,,, | Performed by: INTERNAL MEDICINE

## 2023-02-27 PROCEDURE — 99215 OFFICE O/P EST HI 40 MIN: CPT | Mod: PBBFAC | Performed by: INTERNAL MEDICINE

## 2023-02-27 PROCEDURE — 99214 OFFICE O/P EST MOD 30 MIN: CPT | Mod: S$PBB,,, | Performed by: INTERNAL MEDICINE

## 2023-02-27 PROCEDURE — 99214 PR OFFICE/OUTPT VISIT, EST, LEVL IV, 30-39 MIN: ICD-10-PCS | Mod: S$PBB,,, | Performed by: INTERNAL MEDICINE

## 2023-02-27 NOTE — PROGRESS NOTES
Subjective:       Patient ID: Teresita Tolbert is a 82 y.o. female.    Chief Complaint: Breast Pain, Insomnia, Dysphagia, Back Pain, and Weight Loss    Patient attended by her son, here to review some swallowing and weight issues.  Patient myasthenia gravis, chronic steroid, rheumatoid arthritis, comes in for difficulty swallowing in weight loss.  Patient has lost about 12 or 13 lb in the last 4 months, more going back a year.  She has a couple of issues.  The 1st is she says she has difficulty chewing because she does not have upper teeth, food gets stuck in her throat or she has to spit it out.  She also says she has increase mucus drainage and difficulty tasting food which makes it more difficult.    No trauma.  No head pain or jaw pain  No change in meds.  Her labs about 5 months ago were quite stable but I would like to repeat them since she has lost more weight since then.  She denies any change in medication.    Denies change in stool  Says she can swallow things like cream of wheat and mashed potatoes in small quantities.  6 years ago she had a Schatzki's ring on an EGD and I wonder if that is playing a role again.      Review of Systems   Constitutional:  Positive for appetite change and unexpected weight change.   HENT:  Positive for drooling (Chronic from myasthenia gravis) and trouble swallowing.    Musculoskeletal:  Positive for arthralgias, joint swelling and joint deformity.         Past Medical History:   Diagnosis Date    Anemia     Arthritis of hand 2013    Cataract     Colon polyps     Diabetes mellitus     Diabetic retinopathy     Encounter for blood transfusion     GERD (gastroesophageal reflux disease)     HTN (hypertension) 2013    Hypertension     Myasthenia gravis     Osteoporosis     Type II or unspecified type diabetes mellitus without mention of complication, uncontrolled 2013     Past Surgical History:   Procedure Laterality Date    CATARACT EXTRACTION       SECTION   1964    COLONOSCOPY N/A 5/5/2017    Procedure: COLONOSCOPY;  Surgeon: Greg Salinas MD;  [facility]  Service: Endoscopy;  Laterality: N/A;  hx of Myasthenia Gravis    COLONOSCOPY N/A 5/23/2017    Procedure: COLONOSCOPY/EMR;  Surgeon: Addy Hale MD;  [facility]  Service: Endoscopy;  Laterality: N/A;  EMR or large ascending colon polyp    COLONOSCOPY N/A 9/21/2017    Procedure: COLONOSCOPY;  Surgeon: Addy Hale MD;  [facility]  Service: Endoscopy;  Laterality: N/A;    ESOPHAGOGASTRODUODENOSCOPY      HYSTERECTOMY      s/p avastin   6-26-12    right eye    s/p avastin ou  5-22-12    s/p focal laser  6-26-12    left eye    thalmas gland removed        Patient Active Problem List   Diagnosis    Epiretinal membrane    Posterior vitreous detachment of both eyes    Myasthenia gravis status post thymectomy    High risk medication use    HTN (hypertension)    Diabetes mellitus out of control    Rheumatoid arthritis involving multiple sites with positive rheumatoid factor    PN (peripheral neuropathy)    Senile osteoporosis    Anemia of chronic illness    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     Appearance: She is well-developed.      Comments: Thin appearing   HENT:      Head: Normocephalic and atraumatic.      Right Ear: External ear normal. There is impacted cerumen.      Left Ear: Tympanic membrane, ear canal and external ear normal.      Mouth/Throat:      Pharynx: No oropharyngeal exudate or posterior oropharyngeal erythema.      Comments: Chronic large tongue with increased Salivation  Eyes:      General: No scleral icterus.      Conjunctiva/sclera: Conjunctivae normal.      Pupils: Pupils are equal, round, and reactive to light.   Neck:      Thyroid: No thyromegaly.   Cardiovascular:      Rate and Rhythm: Normal rate and regular rhythm.      Pulses: Normal pulses.      Heart sounds: No murmur heard.  Pulmonary:      Effort: Pulmonary effort is normal.      Breath sounds: Normal breath sounds. No wheezing.   Abdominal:      General: Bowel sounds are normal. There is no distension.      Palpations: Abdomen is soft.      Tenderness: There is no abdominal tenderness.   Musculoskeletal:         General: Deformity present. No tenderness.      Cervical back: Normal range of motion and neck supple.      Right lower leg: No edema.      Left lower leg: No edema.   Lymphadenopathy:      Cervical: No cervical adenopathy.   Skin:     Coloration: Skin is not jaundiced.      Findings: No rash.   Neurological:      General: No focal deficit present.      Mental Status: She is alert and oriented to person, place, and time.   Psychiatric:         Mood and Affect: Mood normal.         Behavior: Behavior normal.       Assessment:       Problem List Items Addressed This Visit          Neuro    Myasthenia gravis status post thymectomy - Primary    Relevant Orders    Ambulatory referral/consult to Endo Procedure     Ambulatory referral/consult to ENT    CBC Auto Differential    Comprehensive Metabolic Panel    TSH    Hemoglobin A1C       Cardiac/Vascular    HTN (hypertension)    Relevant Orders    CBC Auto Differential    Comprehensive Metabolic Panel    TSH    Hemoglobin A1C       Immunology/Multi System    Rheumatoid arthritis involving multiple sites with positive rheumatoid factor    Relevant Orders    CBC Auto Differential    Comprehensive Metabolic Panel    TSH    Hemoglobin A1C       Endocrine    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, without long-term current use of insulin     Relevant Orders    CBC Auto Differential    Comprehensive Metabolic Panel    TSH    Hemoglobin A1C     Other Visit Diagnoses       Esophageal dysphagia        Relevant Orders    Ambulatory referral/consult to Endo Procedure     Ambulatory referral/consult to ENT    CBC Auto Differential    Comprehensive Metabolic Panel    TSH    Hemoglobin A1C    Loss of taste        Relevant Orders    Ambulatory referral/consult to ENT    CBC Auto Differential    Comprehensive Metabolic Panel    TSH    Hemoglobin A1C              Plan:         Teresita was seen today for breast pain, insomnia, dysphagia, back pain and weight loss.    Diagnoses and all orders for this visit:    Myasthenia gravis status post thymectomy  -     Ambulatory referral/consult to Endo Procedure ; Future  -     Ambulatory referral/consult to ENT; Future  -     CBC Auto Differential; Future  -     Comprehensive Metabolic Panel; Future  -     TSH; Future  -     Hemoglobin A1C; Future    Esophageal dysphagia  -     Ambulatory referral/consult to Endo Procedure ; Future  -     Ambulatory referral/consult to ENT; Future  -     CBC Auto Differential; Future  -     Comprehensive Metabolic Panel; Future  -     TSH; Future  -     Hemoglobin A1C; Future    Loss of taste  -     Ambulatory referral/consult to ENT; Future  -     CBC Auto Differential; Future  -     Comprehensive Metabolic Panel; Future  -     TSH; Future  -     Hemoglobin A1C; Future    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  Eval from an ENT and gastro standpoint          Portions of this note may have been created with voice recognition software. Occasional "wrong-word" or "sound-a-like" substitutions may have occurred due to the inherent limitations of voice recognition software. Please, read the note carefully and recognize, using context, where substitutions have occurred.  "

## 2023-03-01 ENCOUNTER — LAB VISIT (OUTPATIENT)
Dept: LAB | Facility: HOSPITAL | Age: 83
End: 2023-03-01
Attending: INTERNAL MEDICINE
Payer: MEDICARE

## 2023-03-01 DIAGNOSIS — Z90.89 MYASTHENIA GRAVIS STATUS POST THYMECTOMY: ICD-10-CM

## 2023-03-01 DIAGNOSIS — I10 PRIMARY HYPERTENSION: ICD-10-CM

## 2023-03-01 DIAGNOSIS — R43.2 LOSS OF TASTE: ICD-10-CM

## 2023-03-01 DIAGNOSIS — G70.00 MYASTHENIA GRAVIS STATUS POST THYMECTOMY: ICD-10-CM

## 2023-03-01 DIAGNOSIS — R13.19 ESOPHAGEAL DYSPHAGIA: ICD-10-CM

## 2023-03-01 DIAGNOSIS — E11.3513 CONTROLLED TYPE 2 DIABETES MELLITUS WITH BOTH EYES AFFECTED BY PROLIFERATIVE RETINOPATHY AND MACULAR EDEMA, WITHOUT LONG-TERM CURRENT USE OF INSULIN: ICD-10-CM

## 2023-03-01 DIAGNOSIS — M05.79 RHEUMATOID ARTHRITIS INVOLVING MULTIPLE SITES WITH POSITIVE RHEUMATOID FACTOR: ICD-10-CM

## 2023-03-01 LAB
ALBUMIN SERPL BCP-MCNC: 3.4 G/DL (ref 3.5–5.2)
ALP SERPL-CCNC: 44 U/L (ref 55–135)
ALT SERPL W/O P-5'-P-CCNC: 7 U/L (ref 10–44)
ANION GAP SERPL CALC-SCNC: 7 MMOL/L (ref 8–16)
AST SERPL-CCNC: 14 U/L (ref 10–40)
BASOPHILS # BLD AUTO: 0.04 K/UL (ref 0–0.2)
BASOPHILS NFR BLD: 0.5 % (ref 0–1.9)
BILIRUB SERPL-MCNC: 0.6 MG/DL (ref 0.1–1)
BUN SERPL-MCNC: 17 MG/DL (ref 8–23)
CALCIUM SERPL-MCNC: 9.5 MG/DL (ref 8.7–10.5)
CHLORIDE SERPL-SCNC: 108 MMOL/L (ref 95–110)
CO2 SERPL-SCNC: 30 MMOL/L (ref 23–29)
CREAT SERPL-MCNC: 1.1 MG/DL (ref 0.5–1.4)
DIFFERENTIAL METHOD: ABNORMAL
EOSINOPHIL # BLD AUTO: 0.2 K/UL (ref 0–0.5)
EOSINOPHIL NFR BLD: 1.9 % (ref 0–8)
ERYTHROCYTE [DISTWIDTH] IN BLOOD BY AUTOMATED COUNT: 14.3 % (ref 11.5–14.5)
EST. GFR  (NO RACE VARIABLE): 49.9 ML/MIN/1.73 M^2
ESTIMATED AVG GLUCOSE: 137 MG/DL (ref 68–131)
GLUCOSE SERPL-MCNC: 97 MG/DL (ref 70–110)
HBA1C MFR BLD: 6.4 % (ref 4–5.6)
HCT VFR BLD AUTO: 42.2 % (ref 37–48.5)
HGB BLD-MCNC: 12.7 G/DL (ref 12–16)
IMM GRANULOCYTES # BLD AUTO: 0.02 K/UL (ref 0–0.04)
IMM GRANULOCYTES NFR BLD AUTO: 0.2 % (ref 0–0.5)
LYMPHOCYTES # BLD AUTO: 1.6 K/UL (ref 1–4.8)
LYMPHOCYTES NFR BLD: 18.6 % (ref 18–48)
MCH RBC QN AUTO: 27.9 PG (ref 27–31)
MCHC RBC AUTO-ENTMCNC: 30.1 G/DL (ref 32–36)
MCV RBC AUTO: 93 FL (ref 82–98)
MONOCYTES # BLD AUTO: 0.7 K/UL (ref 0.3–1)
MONOCYTES NFR BLD: 8.1 % (ref 4–15)
NEUTROPHILS # BLD AUTO: 6.1 K/UL (ref 1.8–7.7)
NEUTROPHILS NFR BLD: 70.7 % (ref 38–73)
NRBC BLD-RTO: 0 /100 WBC
PLATELET # BLD AUTO: 279 K/UL (ref 150–450)
PMV BLD AUTO: 12.8 FL (ref 9.2–12.9)
POTASSIUM SERPL-SCNC: 4.3 MMOL/L (ref 3.5–5.1)
PROT SERPL-MCNC: 6.8 G/DL (ref 6–8.4)
RBC # BLD AUTO: 4.55 M/UL (ref 4–5.4)
SODIUM SERPL-SCNC: 145 MMOL/L (ref 136–145)
TSH SERPL DL<=0.005 MIU/L-ACNC: 1.21 UIU/ML (ref 0.4–4)
WBC # BLD AUTO: 8.59 K/UL (ref 3.9–12.7)

## 2023-03-01 PROCEDURE — 85025 COMPLETE CBC W/AUTO DIFF WBC: CPT | Performed by: INTERNAL MEDICINE

## 2023-03-01 PROCEDURE — 83036 HEMOGLOBIN GLYCOSYLATED A1C: CPT | Performed by: INTERNAL MEDICINE

## 2023-03-01 PROCEDURE — 36415 COLL VENOUS BLD VENIPUNCTURE: CPT | Mod: PN | Performed by: INTERNAL MEDICINE

## 2023-03-01 PROCEDURE — 84443 ASSAY THYROID STIM HORMONE: CPT | Performed by: INTERNAL MEDICINE

## 2023-03-01 PROCEDURE — 80053 COMPREHEN METABOLIC PANEL: CPT | Performed by: INTERNAL MEDICINE

## 2023-03-07 ENCOUNTER — PATIENT MESSAGE (OUTPATIENT)
Dept: INTERNAL MEDICINE | Facility: CLINIC | Age: 83
End: 2023-03-07
Payer: MEDICARE

## 2023-03-07 DIAGNOSIS — R13.19 ESOPHAGEAL DYSPHAGIA: Primary | ICD-10-CM

## 2023-03-10 ENCOUNTER — PATIENT MESSAGE (OUTPATIENT)
Dept: INTERNAL MEDICINE | Facility: CLINIC | Age: 83
End: 2023-03-10
Payer: MEDICARE

## 2023-03-10 NOTE — TELEPHONE ENCOUNTER
I put the order in again.  They were not interested at the visit recently but I guess things have persisted.  Thanks for helping them get this organized

## 2023-03-10 NOTE — TELEPHONE ENCOUNTER
I received a notification that the patient has not read my last portal message.  Can we please check in with the patient to see if they are able to read it or if not please read the message to them and let me know their response or if they have any questions.    Meet Barrera MD FACP  Internal Medicine

## 2023-03-10 NOTE — TELEPHONE ENCOUNTER
Pt 's weight is the same, the swallowing is still difficult. She is maintaining.   He said she will probably have to have the procedure to open her esophagus.  I explained Endoscopy handles their own scheduling.  He agreed to keep up w/ portal messages.  He does have to travel for work but will arrange to have another family member bring her if he is not available that day.

## 2023-03-17 ENCOUNTER — TELEPHONE (OUTPATIENT)
Dept: INFECTIOUS DISEASES | Facility: HOSPITAL | Age: 83
End: 2023-03-17
Payer: MEDICARE

## 2023-03-22 ENCOUNTER — TELEPHONE (OUTPATIENT)
Dept: ENDOSCOPY | Facility: HOSPITAL | Age: 83
End: 2023-03-22
Payer: MEDICARE

## 2023-03-22 VITALS — HEIGHT: 62 IN | BODY MASS INDEX: 18.03 KG/M2 | WEIGHT: 98 LBS

## 2023-03-22 DIAGNOSIS — R13.19 ESOPHAGEAL DYSPHAGIA: Primary | ICD-10-CM

## 2023-03-22 NOTE — PLAN OF CARE
Endoscopy procedure scheduled on 3/28/23, prep instructions reviewed, Pt verbalized understanding. Spoke with patient's son to schedule procedure. Prep instructions to portal and emailed to   [email or web address].

## 2023-03-22 NOTE — TELEPHONE ENCOUNTER
Cassidy León  [facility] Endo Schedulers 1 hour ago (1:25 PM)     HC  Please assist       Johanny Chua LPN routed conversation to Checkout Staff [facility] Medicine 2 hours ago (11:53 AM)      Meet Barrera MD routed conversation to Nurse Bruce GERMAIN 6 hours ago (7:40 AM)     Meet Barrera MD 6 hours ago (7:40 AM)       Can we work with pt's son to assist with getting a sooner upper scope for this pt who is having great difficulty swallow?  We have had recent success with other patients at other locations like [facility], or by calling the gastro upper scope department. This Cannot Wait until June. If we need an actual GI appointment let me know. But we need to try to get this in the next week or so.             Note      Meet Barrera MD Wilma K Wright 6 hours ago (7:38 AM)       We should be able to get the scope much sooner.   Let me see what we can do.     This Patient Portal message has not been read.     Leticia Callaway MA routed conversation to Meet Barrera MD 8 hours ago (6:29 AM)     Teresita WILHELM Staff (supporting Meet Barrera MD) 18 hours ago (7:49 PM)       Only was able to schedule appointments  on May 16, ENT and Jun 29, Endoscopy. ...Nevertheless she is eating less and when she manages to swallow can't hold down food thus far.. the throat problem remains...Also, give a home COVID test and thus far recieved a negative reading...so I am at wits end on what's going on and will continue to try to get some nourishment as best as I can....thus, advise should there be a need to redirect current assistance.     Thanks       Meet Barrera MD Wilma K Wright 10 days ago       Thanks, yes, ENT for throat and the scope of the esophagus.        Noris Ying, RN  Meet Barrera MD 10 days ago     MM  Pt reply      Teresita WILHELM Staff (supporting Meet Barrera MD) 12 days ago       Spoke with the nurse earlier, things are about  the same. As I told her the next step should be an appointment with the throat folks....So that's where we are...     Thanks for your follow-up..       KACI Cohen MD 12 days ago     CW  Fwd'd to Endo for scheduling      Mira Casas LPN  [facility] Gastro Clinical Support Staff 12 days ago     CW  Please review for scheduling      Meet Barrera MD routed conversation to Nurse Bruce GERMAIN 12 days ago     KACI Cohen MD 12 days ago     CW  I see a referral to ENT did you want to add Endoscopy also ?      Mira Casas LPN routed conversation to Meet Barrera MD 12 days ago     Meet Barrera MD routed conversation to Nurse Bruce GERMAIN 12 days ago     Meet Barrera MD Wilma K Wright 2 weeks ago       Labs all relatively stable.  How is the appetite, weight and swallowing doing?

## 2023-03-24 ENCOUNTER — PATIENT MESSAGE (OUTPATIENT)
Dept: INTERNAL MEDICINE | Facility: CLINIC | Age: 83
End: 2023-03-24
Payer: MEDICARE

## 2023-03-25 ENCOUNTER — PATIENT MESSAGE (OUTPATIENT)
Dept: ENDOSCOPY | Facility: HOSPITAL | Age: 83
End: 2023-03-25
Payer: MEDICARE

## 2023-03-27 ENCOUNTER — TELEPHONE (OUTPATIENT)
Dept: RHEUMATOLOGY | Facility: CLINIC | Age: 83
End: 2023-03-27
Payer: MEDICARE

## 2023-03-27 NOTE — TELEPHONE ENCOUNTER
----- Message from Meet Barrera MD sent at 3/27/2023  5:14 PM CDT -----  Regarding: RE:   I can ask my staff. Antonia, would you be able to matti Teresita Tolbert as ?  ----- Message -----  From: Petrona Sotelo RN  Sent: 3/27/2023   4:30 PM CDT  To: Meet Barrera MD, #  Subject:                                          The family informed us that this patient passed away on Friday 3.24.23. Would one of you be able to matti her chart appropriately?    Thank you for caring for her     Petrona Sotelo

## 2023-03-28 ENCOUNTER — PATIENT OUTREACH (OUTPATIENT)
Dept: ADMINISTRATIVE | Facility: HOSPITAL | Age: 83
End: 2023-03-28
Payer: MEDICARE

## 2025-05-01 NOTE — TELEPHONE ENCOUNTER
AUTHORIZATION FOR RELEASE OF   CONFIDENTIAL INFORMATION    Dear ADRIANA,    We are seeing Theodora Murray, date of birth 1987, in the clinic at [facility]. Jose Singh MD is the patient's PCP. Theodora Murray has an outstanding lab/procedure at the time we reviewed her chart. In order to help keep her health information updated, she has authorized us to request the following medical record(s):        (  )  MAMMOGRAM                                      (  )  COLONOSCOPY      (  )  PAP SMEAR                                          (  )  OUTSIDE LAB RESULTS     (  )  DEXA SCAN                                          (  )  EYE EXAM            (  )  FOOT EXAM                                          (  )  ENTIRE RECORD     (  )  OUTSIDE IMMUNIZATIONS                 ( X )  One year of records         Please fax records to [facility], Eakes, Patrick H, MD, [phone]     If you have any questions, please contact [facility] at [phone].           Patient Name: Theodora Murray  : 1987  Patient Phone #: [phone]      Care Due:                  Date            Visit Type   Department     Provider  --------------------------------------------------------------------------------                                             [facility]  Last Visit: 07-      None         ROSA Barrera  Next Visit: None Scheduled  None         None Found                                                            Last  Test          Frequency    Reason                     Performed    Due Date  --------------------------------------------------------------------------------    HBA1C.......  6 months...  JANUMET..................  07- 01-    Powered by [employer]. Reference number: [id number]. 12/28/2020 2:16:20 PM   CST